# Patient Record
Sex: FEMALE | Race: WHITE | NOT HISPANIC OR LATINO | URBAN - METROPOLITAN AREA
[De-identification: names, ages, dates, MRNs, and addresses within clinical notes are randomized per-mention and may not be internally consistent; named-entity substitution may affect disease eponyms.]

---

## 2017-03-29 ENCOUNTER — EMERGENCY (EMERGENCY)
Facility: HOSPITAL | Age: 82
LOS: 1 days | Discharge: HOME | End: 2017-03-29
Attending: EMERGENCY MEDICINE | Admitting: EMERGENCY MEDICINE
Payer: MEDICARE

## 2017-03-29 VITALS
DIASTOLIC BLOOD PRESSURE: 75 MMHG | HEART RATE: 95 BPM | RESPIRATION RATE: 18 BRPM | OXYGEN SATURATION: 99 % | TEMPERATURE: 98 F | SYSTOLIC BLOOD PRESSURE: 172 MMHG

## 2017-03-29 VITALS — HEIGHT: 58 IN | WEIGHT: 134.92 LBS

## 2017-03-29 PROCEDURE — 99283 EMERGENCY DEPT VISIT LOW MDM: CPT

## 2017-03-29 PROCEDURE — 73552 X-RAY EXAM OF FEMUR 2/>: CPT | Mod: 26,LT

## 2017-03-29 PROCEDURE — 72170 X-RAY EXAM OF PELVIS: CPT

## 2017-03-29 PROCEDURE — 73502 X-RAY EXAM HIP UNI 2-3 VIEWS: CPT | Mod: 26,LT

## 2017-03-29 PROCEDURE — 73552 X-RAY EXAM OF FEMUR 2/>: CPT

## 2017-03-29 PROCEDURE — 73502 X-RAY EXAM HIP UNI 2-3 VIEWS: CPT

## 2017-03-29 PROCEDURE — 99284 EMERGENCY DEPT VISIT MOD MDM: CPT | Mod: 25

## 2017-03-29 NOTE — ED STATDOCS - OBJECTIVE STATEMENT
89 year old female, with hx of left hip surgery 20 years ago, presenting to the ED complaining of left hip pain s/p fall 2-3 weeks ago. Pt's family states that the pt had fallen on her left side and called [facility], but did not visit an ED for an evaluation. She states that she is ambulating with a walker. No further complaints at this time.

## 2017-03-29 NOTE — ED ADULT TRIAGE NOTE - CHIEF COMPLAINT QUOTE
Pt brought in by son for fall, pt fell about two to three weeks ago and the ambulance came (life alert) but pt didn't go to the hospital. Pt's son states pt can't even walk now. Pt denies blood thinners. Pt brought in by son for fall, pt fell about two to three weeks ago and the ambulance came (life alert) but pt didn't go to the hospital. Pt's son states pt can't even walk now. Pt had left hip surgery about 20yrs ago, and that is where she is c/o pain. Pt denies blood thinners.

## 2017-03-29 NOTE — PROVIDER CONTACT NOTE (OTHER) - ASSESSMENT
No c/o pain, before, 8/10 left hip pain during and after amb. Pt is functionally independent and not in need of PT.  Will no longer continue to follow. Pt left sitting OOB in chair in no apparent distress and call bell within reach, RN made aware.

## 2017-03-29 NOTE — ED STATDOCS - NS ED MD SCRIBE ATTENDING SCRIBE SECTIONS
PAST MEDICAL/SURGICAL/SOCIAL HISTORY/REVIEW OF SYSTEMS/HISTORY OF PRESENT ILLNESS/HIV/DISPOSITION/INTAKE ASSESSMENT/SCREENINGS/VITAL SIGNS( Pullset)/PHYSICAL EXAM

## 2017-03-29 NOTE — PHYSICAL THERAPY INITIAL EVALUATION ADULT - ADDITIONAL COMMENTS
Pt lives in a 1st floor apartment, alone, no steps.  Had HHA 5 days a week since fall. Prior to fall pt amb with [facility] and independent with all.  Since fall, amb with SW, short distances. Needed assist with all.

## 2017-03-29 NOTE — PHYSICAL THERAPY INITIAL EVALUATION ADULT - RANGE OF MOTION EXAMINATION, REHAB EVAL
bilateral upper extremity ROM was WFL (within functional limits)/except left hip limited by pain/bilateral lower extremity ROM was WFL (within functional limits)

## 2017-03-29 NOTE — ED STATDOCS - ATTENDING CONTRIBUTION TO CARE
I, Anika Smith, performed the initial face to face bedside interview with this patient regarding history of present illness, review of symptoms and relevant past medical, social and family history.  I completed an independent physical examination.  I was the initial provider who evaluated this patient. I have signed out the follow up of any pending tests (i.e. labs, radiological studies) to the ACP.  I have communicated the patient’s plan of care and disposition with the ACP.  The history, relevant review of systems, past medical and surgical history, medical decision making, and physical examination was documented by the scribe in my presence and I attest to the accuracy of the documentation.

## 2017-03-29 NOTE — ED STATDOCS - PROGRESS NOTE DETAILS
Pts xrays show an isolated linear nondisplaced fracture of the left inferior pubic ramus. Pt is usually ambulatory with a walker at home and lives by herself. Son states that pt has required help over the past week and is no longer able to function on her own like she was able to prior to the fall. D/w Dr. Smith-- will get PT eval and SW consult for likely home care + d/c. Spoke with Kevin from Physical Therapy-- evaluated pt and states that pt is stable for d/c to home with mobile walker. Awaiting SW consult. SW states pt is okay to go home with home care. Pt stable for d/c.

## 2017-05-04 PROBLEM — Z00.00 ENCOUNTER FOR PREVENTIVE HEALTH EXAMINATION: Status: ACTIVE | Noted: 2017-05-04

## 2017-05-08 ENCOUNTER — OUTPATIENT (OUTPATIENT)
Dept: OUTPATIENT SERVICES | Facility: HOSPITAL | Age: 82
LOS: 1 days | End: 2017-05-08
Payer: MEDICARE

## 2017-05-08 ENCOUNTER — APPOINTMENT (OUTPATIENT)
Dept: RADIOLOGY | Facility: CLINIC | Age: 82
End: 2017-05-08
Payer: MEDICARE

## 2017-05-08 DIAGNOSIS — Z00.8 ENCOUNTER FOR OTHER GENERAL EXAMINATION: ICD-10-CM

## 2017-05-08 PROCEDURE — 77080 DXA BONE DENSITY AXIAL: CPT

## 2017-05-08 PROCEDURE — 77080 DXA BONE DENSITY AXIAL: CPT | Mod: 26

## 2017-07-17 ENCOUNTER — INPATIENT (INPATIENT)
Facility: HOSPITAL | Age: 82
LOS: 0 days | Discharge: HOME HEALTH | DRG: 638 | End: 2017-07-18
Attending: FAMILY MEDICINE | Admitting: HOSPITALIST
Payer: COMMERCIAL

## 2017-07-17 VITALS
WEIGHT: 149.91 LBS | RESPIRATION RATE: 18 BRPM | HEIGHT: 63 IN | SYSTOLIC BLOOD PRESSURE: 190 MMHG | TEMPERATURE: 98 F | DIASTOLIC BLOOD PRESSURE: 88 MMHG | HEART RATE: 88 BPM

## 2017-07-17 DIAGNOSIS — I16.1 HYPERTENSIVE EMERGENCY: ICD-10-CM

## 2017-07-17 LAB
ALBUMIN SERPL ELPH-MCNC: 4.8 G/DL — SIGNIFICANT CHANGE UP (ref 3.3–5.2)
ALP SERPL-CCNC: 88 U/L — SIGNIFICANT CHANGE UP (ref 40–120)
ALT FLD-CCNC: 9 U/L — SIGNIFICANT CHANGE UP
ANION GAP SERPL CALC-SCNC: 16 MMOL/L — SIGNIFICANT CHANGE UP (ref 5–17)
APTT BLD: 29.8 SEC — SIGNIFICANT CHANGE UP (ref 27.5–37.4)
AST SERPL-CCNC: 16 U/L — SIGNIFICANT CHANGE UP
BASOPHILS # BLD AUTO: 0 K/UL — SIGNIFICANT CHANGE UP (ref 0–0.2)
BASOPHILS NFR BLD AUTO: 0.2 % — SIGNIFICANT CHANGE UP (ref 0–2)
BILIRUB SERPL-MCNC: 0.3 MG/DL — LOW (ref 0.4–2)
BLD GP AB SCN SERPL QL: SIGNIFICANT CHANGE UP
BUN SERPL-MCNC: 31 MG/DL — HIGH (ref 8–20)
CALCIUM SERPL-MCNC: 9.6 MG/DL — SIGNIFICANT CHANGE UP (ref 8.6–10.2)
CHLORIDE SERPL-SCNC: 91 MMOL/L — LOW (ref 98–107)
CO2 SERPL-SCNC: 23 MMOL/L — SIGNIFICANT CHANGE UP (ref 22–29)
CREAT SERPL-MCNC: 1.26 MG/DL — SIGNIFICANT CHANGE UP (ref 0.5–1.3)
EOSINOPHIL # BLD AUTO: 0.3 K/UL — SIGNIFICANT CHANGE UP (ref 0–0.5)
EOSINOPHIL NFR BLD AUTO: 2.4 % — SIGNIFICANT CHANGE UP (ref 0–6)
GLUCOSE SERPL-MCNC: 51 MG/DL — LOW (ref 70–115)
HCT VFR BLD CALC: 32.5 % — LOW (ref 37–47)
HGB BLD-MCNC: 10.8 G/DL — LOW (ref 12–16)
INR BLD: 1.12 RATIO — SIGNIFICANT CHANGE UP (ref 0.88–1.16)
LYMPHOCYTES # BLD AUTO: 18.5 % — LOW (ref 20–55)
LYMPHOCYTES # BLD AUTO: 2.1 K/UL — SIGNIFICANT CHANGE UP (ref 1–4.8)
MCHC RBC-ENTMCNC: 27.1 PG — SIGNIFICANT CHANGE UP (ref 27–31)
MCHC RBC-ENTMCNC: 33.2 G/DL — SIGNIFICANT CHANGE UP (ref 32–36)
MCV RBC AUTO: 81.5 FL — SIGNIFICANT CHANGE UP (ref 81–99)
MONOCYTES # BLD AUTO: 0.8 K/UL — SIGNIFICANT CHANGE UP (ref 0–0.8)
MONOCYTES NFR BLD AUTO: 7.2 % — SIGNIFICANT CHANGE UP (ref 3–10)
NEUTROPHILS # BLD AUTO: 8.1 K/UL — HIGH (ref 1.8–8)
NEUTROPHILS NFR BLD AUTO: 71 % — SIGNIFICANT CHANGE UP (ref 37–73)
PLATELET # BLD AUTO: 215 K/UL — SIGNIFICANT CHANGE UP (ref 150–400)
POTASSIUM SERPL-MCNC: 3.7 MMOL/L — SIGNIFICANT CHANGE UP (ref 3.5–5.3)
POTASSIUM SERPL-SCNC: 3.7 MMOL/L — SIGNIFICANT CHANGE UP (ref 3.5–5.3)
PROT SERPL-MCNC: 7.6 G/DL — SIGNIFICANT CHANGE UP (ref 6.6–8.7)
PROTHROM AB SERPL-ACNC: 12.3 SEC — SIGNIFICANT CHANGE UP (ref 9.8–12.7)
RBC # BLD: 3.99 M/UL — LOW (ref 4.4–5.2)
RBC # FLD: 14.4 % — SIGNIFICANT CHANGE UP (ref 11–15.6)
SODIUM SERPL-SCNC: 130 MMOL/L — LOW (ref 135–145)
TROPONIN T SERPL-MCNC: <0.01 NG/ML — SIGNIFICANT CHANGE UP (ref 0–0.06)
TYPE + AB SCN PNL BLD: SIGNIFICANT CHANGE UP
WBC # BLD: 11.4 K/UL — HIGH (ref 4.8–10.8)
WBC # FLD AUTO: 11.4 K/UL — HIGH (ref 4.8–10.8)

## 2017-07-17 PROCEDURE — 70450 CT HEAD/BRAIN W/O DYE: CPT | Mod: 26

## 2017-07-17 PROCEDURE — 99291 CRITICAL CARE FIRST HOUR: CPT

## 2017-07-17 PROCEDURE — 93010 ELECTROCARDIOGRAM REPORT: CPT

## 2017-07-17 PROCEDURE — 71010: CPT | Mod: 26

## 2017-07-17 RX ORDER — LABETALOL HCL 100 MG
10 TABLET ORAL ONCE
Qty: 0 | Refills: 0 | Status: COMPLETED | OUTPATIENT
Start: 2017-07-17 | End: 2017-07-17

## 2017-07-17 RX ORDER — DEXTROSE 50 % IN WATER 50 %
50 SYRINGE (ML) INTRAVENOUS ONCE
Qty: 0 | Refills: 0 | Status: COMPLETED | OUTPATIENT
Start: 2017-07-17 | End: 2017-07-17

## 2017-07-17 RX ORDER — ENOXAPARIN SODIUM 100 MG/ML
30 INJECTION SUBCUTANEOUS DAILY
Qty: 0 | Refills: 0 | Status: DISCONTINUED | OUTPATIENT
Start: 2017-07-17 | End: 2017-07-18

## 2017-07-17 RX ADMIN — Medication 10 MILLIGRAM(S): at 17:58

## 2017-07-17 RX ADMIN — Medication 50 MILLILITER(S): at 17:00

## 2017-07-17 RX ADMIN — Medication 50 MILLILITER(S): at 22:15

## 2017-07-17 NOTE — ED ADULT TRIAGE NOTE - CHIEF COMPLAINT QUOTE
patient c/o confusion and slurred speech, was found by compainion altered and hypoglycemic and hypertensive.

## 2017-07-17 NOTE — ED ADULT NURSE REASSESSMENT NOTE - NS ED NURSE REASSESS COMMENT FT1
code stroke called by MD Garcia at 1611, E-ICU called at 1612. code stroke team at the bedside 1614
pateint to CT scan at this [id number]
Per pt family, pt appears more confused and disoriented. FS completed and documented. MD informed, MD orders noted and initiated. after receiving 1 amp dextrose IV- pt a+o, baseline mental status per family. provided pt meal and water. will continue to monitor.
pt awake and alert x2, confused on current year lying in bed with niece at bedside. pt lying comfortably in bed, denies any chest pain or SOB. pt denies any pain or discomfort. pt to be admitted, awaiting orders. abd soft nontender nondistended. bilat lung sounds clear, resp even and unlabored.  NSR on monitor, maintaining 02 on RA, will continue to monitor.

## 2017-07-17 NOTE — ED PROVIDER NOTE - OBJECTIVE STATEMENT
89 y/o female with h/o DM and HTN on oral hypoglycemic medication, was noticed by caretaker to have slurred speech onset 1 hour prior to arrival. pt is confused and with slurred speech on arrival to ed unable to provide adaquate history

## 2017-07-17 NOTE — ED PROVIDER NOTE - MEDICAL DECISION MAKING DETAILS
two episodes hypoglycemia causing ams slurred speech concern on glyburide plus marked elevation bp possibly causing ams symptoms hard to differentiate too many risk factors to send home. code stroke cancelled secondary to improvement in pts symptoms with iv dextrose however still markedly htn responded to iv anti htn need for multiple bedside reassessments neurological status

## 2017-07-17 NOTE — ED PROVIDER NOTE - CARE PLAN
Principal Discharge DX:	Hypertensive emergency  Secondary Diagnosis:	Confusion  Secondary Diagnosis:	Hypoglycemia

## 2017-07-17 NOTE — H&P ADULT - PMH
Coronary artery disease involving native coronary artery of native heart without angina pectoris    DM (diabetes mellitus)    High cholesterol    HTN (hypertension)

## 2017-07-17 NOTE — INPATIENT CERTIFICATION FOR MEDICARE PATIENTS - IN ORDER TO MEET MEDICARE REQUIREMENTS.
In order to meet Medicare requirements, the clinical documentation must support the information cited in the admission order.  Please be sure to provide detailed and clear documentation about the following in the admitting note/history and physical: Yes, Non-Core measure site...

## 2017-07-17 NOTE — ED PROVIDER NOTE - CRITICAL CARE PROVIDED
additional history taking/direct patient care (not related to procedure)/documentation/consultation with other physicians/interpretation of diagnostic studies

## 2017-07-17 NOTE — H&P ADULT - HISTORY OF PRESENT ILLNESS
89 y/o female with h/o DM and HTN on oral hypoglycemic medication, was noticed to slow with slurred speech 89 y/o female with h/o DM and HTN on oral hypoglycemic medication, was noticed to slow with slurred speech. abmbulance was called and blood sugar was low. patient was treated in ambulance and repeat blood sugar 89 y/o female with h/o DM and HTN on oral hypoglycemic medication, was noticed to slow with slurred speech. abmbulance was called and blood sugar was low. patient was treated in ambulance and repeat blood sugar was 45,75, 85, 55 mg/dl. in the ER, last blood sugar was 200 mg/dl. patient is alert and asymptomatic.

## 2017-07-18 ENCOUNTER — TRANSCRIPTION ENCOUNTER (OUTPATIENT)
Age: 82
End: 2017-07-18

## 2017-07-18 VITALS
OXYGEN SATURATION: 99 % | RESPIRATION RATE: 23 BRPM | SYSTOLIC BLOOD PRESSURE: 144 MMHG | DIASTOLIC BLOOD PRESSURE: 62 MMHG | TEMPERATURE: 98 F | HEART RATE: 73 BPM

## 2017-07-18 DIAGNOSIS — E78.5 HYPERLIPIDEMIA, UNSPECIFIED: ICD-10-CM

## 2017-07-18 DIAGNOSIS — I25.10 ATHEROSCLEROTIC HEART DISEASE OF NATIVE CORONARY ARTERY WITHOUT ANGINA PECTORIS: ICD-10-CM

## 2017-07-18 DIAGNOSIS — E16.2 HYPOGLYCEMIA, UNSPECIFIED: ICD-10-CM

## 2017-07-18 DIAGNOSIS — E11.8 TYPE 2 DIABETES MELLITUS WITH UNSPECIFIED COMPLICATIONS: ICD-10-CM

## 2017-07-18 DIAGNOSIS — Z98.42 CATARACT EXTRACTION STATUS, LEFT EYE: Chronic | ICD-10-CM

## 2017-07-18 DIAGNOSIS — Z98.41 CATARACT EXTRACTION STATUS, RIGHT EYE: Chronic | ICD-10-CM

## 2017-07-18 DIAGNOSIS — I10 ESSENTIAL (PRIMARY) HYPERTENSION: ICD-10-CM

## 2017-07-18 LAB
ANION GAP SERPL CALC-SCNC: 19 MMOL/L — HIGH (ref 5–17)
BUN SERPL-MCNC: 22 MG/DL — HIGH (ref 8–20)
CALCIUM SERPL-MCNC: 9.7 MG/DL — SIGNIFICANT CHANGE UP (ref 8.6–10.2)
CHLORIDE SERPL-SCNC: 89 MMOL/L — LOW (ref 98–107)
CHOLEST SERPL-MCNC: 119 MG/DL — SIGNIFICANT CHANGE UP (ref 110–199)
CO2 SERPL-SCNC: 23 MMOL/L — SIGNIFICANT CHANGE UP (ref 22–29)
CREAT SERPL-MCNC: 1.09 MG/DL — SIGNIFICANT CHANGE UP (ref 0.5–1.3)
GLUCOSE SERPL-MCNC: 173 MG/DL — HIGH (ref 70–115)
HBA1C BLD-MCNC: 5.7 % — HIGH (ref 4–5.6)
HCT VFR BLD CALC: 32.8 % — LOW (ref 37–47)
HDLC SERPL-MCNC: 41 MG/DL — LOW
HGB BLD-MCNC: 10.8 G/DL — LOW (ref 12–16)
LIPID PNL WITH DIRECT LDL SERPL: 43 MG/DL — SIGNIFICANT CHANGE UP
MCHC RBC-ENTMCNC: 26.8 PG — LOW (ref 27–31)
MCHC RBC-ENTMCNC: 32.9 G/DL — SIGNIFICANT CHANGE UP (ref 32–36)
MCV RBC AUTO: 81.4 FL — SIGNIFICANT CHANGE UP (ref 81–99)
PLATELET # BLD AUTO: 227 K/UL — SIGNIFICANT CHANGE UP (ref 150–400)
POTASSIUM SERPL-MCNC: 4.2 MMOL/L — SIGNIFICANT CHANGE UP (ref 3.5–5.3)
POTASSIUM SERPL-SCNC: 4.2 MMOL/L — SIGNIFICANT CHANGE UP (ref 3.5–5.3)
RBC # BLD: 4.03 M/UL — LOW (ref 4.4–5.2)
RBC # FLD: 14.2 % — SIGNIFICANT CHANGE UP (ref 11–15.6)
SODIUM SERPL-SCNC: 131 MMOL/L — LOW (ref 135–145)
T3 SERPL-MCNC: 105 NG/DL — SIGNIFICANT CHANGE UP (ref 80–200)
T4 AB SER-ACNC: 7.4 UG/DL — SIGNIFICANT CHANGE UP (ref 4.5–12)
TOTAL CHOLESTEROL/HDL RATIO MEASUREMENT: 3 RATIO — LOW (ref 3.3–7.1)
TRIGL SERPL-MCNC: 174 MG/DL — SIGNIFICANT CHANGE UP (ref 10–200)
TSH SERPL-MCNC: 1.64 UIU/ML — SIGNIFICANT CHANGE UP (ref 0.27–4.2)
WBC # BLD: 9.4 K/UL — SIGNIFICANT CHANGE UP (ref 4.8–10.8)
WBC # FLD AUTO: 9.4 K/UL — SIGNIFICANT CHANGE UP (ref 4.8–10.8)

## 2017-07-18 PROCEDURE — 80061 LIPID PANEL: CPT

## 2017-07-18 PROCEDURE — 99239 HOSP IP/OBS DSCHRG MGMT >30: CPT

## 2017-07-18 PROCEDURE — 84484 ASSAY OF TROPONIN QUANT: CPT

## 2017-07-18 PROCEDURE — 84436 ASSAY OF TOTAL THYROXINE: CPT

## 2017-07-18 PROCEDURE — 85610 PROTHROMBIN TIME: CPT

## 2017-07-18 PROCEDURE — 96374 THER/PROPH/DIAG INJ IV PUSH: CPT

## 2017-07-18 PROCEDURE — 84480 ASSAY TRIIODOTHYRONINE (T3): CPT

## 2017-07-18 PROCEDURE — 83036 HEMOGLOBIN GLYCOSYLATED A1C: CPT

## 2017-07-18 PROCEDURE — 71045 X-RAY EXAM CHEST 1 VIEW: CPT

## 2017-07-18 PROCEDURE — 85730 THROMBOPLASTIN TIME PARTIAL: CPT

## 2017-07-18 PROCEDURE — 96375 TX/PRO/DX INJ NEW DRUG ADDON: CPT

## 2017-07-18 PROCEDURE — 86901 BLOOD TYPING SEROLOGIC RH(D): CPT

## 2017-07-18 PROCEDURE — 85027 COMPLETE CBC AUTOMATED: CPT

## 2017-07-18 PROCEDURE — 80053 COMPREHEN METABOLIC PANEL: CPT

## 2017-07-18 PROCEDURE — 36415 COLL VENOUS BLD VENIPUNCTURE: CPT

## 2017-07-18 PROCEDURE — 86850 RBC ANTIBODY SCREEN: CPT

## 2017-07-18 PROCEDURE — 93005 ELECTROCARDIOGRAM TRACING: CPT

## 2017-07-18 PROCEDURE — 80048 BASIC METABOLIC PNL TOTAL CA: CPT

## 2017-07-18 PROCEDURE — 70450 CT HEAD/BRAIN W/O DYE: CPT

## 2017-07-18 PROCEDURE — 92610 EVALUATE SWALLOWING FUNCTION: CPT

## 2017-07-18 PROCEDURE — 84443 ASSAY THYROID STIM HORMONE: CPT

## 2017-07-18 PROCEDURE — 86900 BLOOD TYPING SEROLOGIC ABO: CPT

## 2017-07-18 PROCEDURE — 99285 EMERGENCY DEPT VISIT HI MDM: CPT | Mod: 25

## 2017-07-18 RX ORDER — HYDROCHLOROTHIAZIDE 25 MG
25 TABLET ORAL DAILY
Qty: 0 | Refills: 0 | Status: DISCONTINUED | OUTPATIENT
Start: 2017-07-18 | End: 2017-07-18

## 2017-07-18 RX ORDER — SIMVASTATIN 20 MG/1
40 TABLET, FILM COATED ORAL AT BEDTIME
Qty: 0 | Refills: 0 | Status: DISCONTINUED | OUTPATIENT
Start: 2017-07-18 | End: 2017-07-18

## 2017-07-18 RX ORDER — CLOPIDOGREL BISULFATE 75 MG/1
75 TABLET, FILM COATED ORAL DAILY
Qty: 0 | Refills: 0 | Status: DISCONTINUED | OUTPATIENT
Start: 2017-07-18 | End: 2017-07-18

## 2017-07-18 RX ORDER — ASPIRIN/CALCIUM CARB/MAGNESIUM 324 MG
325 TABLET ORAL DAILY
Qty: 0 | Refills: 0 | Status: DISCONTINUED | OUTPATIENT
Start: 2017-07-17 | End: 2017-07-18

## 2017-07-18 RX ORDER — LISINOPRIL 2.5 MG/1
40 TABLET ORAL DAILY
Qty: 0 | Refills: 0 | Status: DISCONTINUED | OUTPATIENT
Start: 2017-07-17 | End: 2017-07-18

## 2017-07-18 RX ORDER — GLIMEPIRIDE 1 MG
1 TABLET ORAL
Qty: 0 | Refills: 0 | COMMUNITY

## 2017-07-18 RX ORDER — METOPROLOL TARTRATE 50 MG
25 TABLET ORAL
Qty: 0 | Refills: 0 | Status: DISCONTINUED | OUTPATIENT
Start: 2017-07-18 | End: 2017-07-18

## 2017-07-18 RX ADMIN — Medication 325 MILLIGRAM(S): at 11:16

## 2017-07-18 RX ADMIN — Medication 25 MILLIGRAM(S): at 06:11

## 2017-07-18 RX ADMIN — LISINOPRIL 40 MILLIGRAM(S): 2.5 TABLET ORAL at 06:11

## 2017-07-18 RX ADMIN — ENOXAPARIN SODIUM 30 MILLIGRAM(S): 100 INJECTION SUBCUTANEOUS at 11:16

## 2017-07-18 RX ADMIN — CLOPIDOGREL BISULFATE 75 MILLIGRAM(S): 75 TABLET, FILM COATED ORAL at 11:16

## 2017-07-18 NOTE — DISCHARGE NOTE ADULT - PATIENT PORTAL LINK FT
“You can access the [facility] Patient Portal, offered by [facility], by registering with the following website: [email or web address]”

## 2017-07-18 NOTE — DISCHARGE NOTE ADULT - MEDICATION SUMMARY - MEDICATIONS TO TAKE
I will START or STAY ON the medications listed below when I get home from the hospital:    ROLLING WALKER  -- PLEASE GIVE ONE ROLLING WALKER  -- Indication: For Unstead gait    Aspirin Enteric Coated 325 mg oral delayed release tablet  -- 1 tab(s) by mouth once a day  -- Indication: For Cardio protective lorena    quinapril 40 mg oral tablet  -- 1 tab(s) by mouth once a day  -- Indication: For HTN (hypertension)    Janumet XR 50 mg-500 mg oral tablet, extended release  -- 2 tab(s) by mouth once a day (in the evening)  -- Indication: For DM (diabetes mellitus)    ezetimibe 10 mg oral tablet  -- 1 tab(s) by mouth once a day  -- Indication: For HLD    simvastatin 40 mg oral tablet  -- 1 tab(s) by mouth once a day (at bedtime)  -- Indication: For HLD    clopidogrel 75 mg oral tablet  -- 1 tab(s) by mouth once a day  -- Indication: For Cardio Protective Health    Bystolic 5 mg oral tablet  -- 1 tab(s) by mouth once a day  -- Indication: For HTN (hypertension)    hydroCHLOROthiazide 25 mg oral tablet  -- 1 tab(s) by mouth once a day  -- Indication: For HTN (hypertension)

## 2017-07-18 NOTE — SWALLOW BEDSIDE ASSESSMENT ADULT - ASR SWALLOW ASPIRATION MONITOR
upper respiratory infection/gurgly voice/change of breathing pattern/throat clearing/position upright (89Y)/oral hygiene/pneumonia/fever/cough

## 2017-07-18 NOTE — PATIENT PROFILE ADULT. - NS PRO TALK SOMEONE YN
Cystoscopy  Date/Time: 2/2/2017 1:39 PM  Performed by: PALMIRA CALIX  Authorized by: PALMIRA CALIX     Indications: hematuria    Position:  Dorsal lithotomy  Anesthesia:  Intraurethral instillation  Patient sedated?: No    Preparation: Patient was prepped and draped in usual sterile fashion      Scope type:  Flexible cystoscope  Stent inserted: No    Stent removed: No    External exam normal: Yes    Digital exam performed: No    Urethra normal: Yes  Bladder neck normal: Bladder neck normal   Bladder normal: Yes      Patient tolerance:  Patient tolerated the procedure well with no immediate complications     F/u prn      
no

## 2017-07-18 NOTE — PATIENT PROFILE ADULT. - SOCIAL CONCERNS
None pt may need possible assisted living placement, family requesting information/Needs assistance with advanced directives pt may need possible assisted living placement, lives alone, family requesting information/Needs assistance with advanced directives

## 2017-07-18 NOTE — DISCHARGE NOTE ADULT - HOSPITAL COURSE
89 y/o female with h/o DM and HTN on oral hypoglycemic medication who was brought to ED after episode of slurred speech at home. In the ambulance, pts BG levels was low and was further treated in both ambulance and the ED. CT head in ED negative and CXR in ED negative. Pt was treated in the ED for hypoglycemia with 2 amps of D50. Pts BG responded. In hospital, all other vitals remained stable, no other acute events. Prior to discharge pt and pts family/ discussed the need for more supervision of patient while she is at home. Case management was notified and will set up based on what pts insurance will cover. Pt medically cleared for discharge.         HPI: Patient is a 89y old Female with PMH of HTN, HLD who presented to ED due to episode of slurred speech. In ED found to be hypoglycemic and low BG treated with dextrose. PTs BG has responded and has remained wnl since.     Pt seen and examined at bedside today. Pts  present. ANO x 3. Pt states noticeable improvement and medically feels fine. Pt's  stresses concern for once patient is discharged regarding the need for more pt supervision. ROS negative. Pt and pt's  informed that Glimepiride will be discontinued and pt should no longer take at home.    PHYSICAL EXAM:  GENERAL: NAD, well-groomed, well-developed  NERVOUS SYSTEM:  Alert & Oriented X3, Good concentration; heard of hearing in right ear  CHEST/LUNG: Clear to auscultation b/l, No rales, rhonchi, wheezing, or rubs  HEART: RRR, No murmurs, rubs, or gallops  ABDOMEN: Soft, Nontender, Nondistended; Bowel sounds present  EXTREMITIES:  2+ Peripheral Pulses, No clubbing, cyanosis, or edema    After assessment today, pt is medically cleared to be discharged. Pts outpatient medications were changed, glimperide was discontinued due to repeated episodes of hypoglycemia. All other home meds will remain untouched. Pt was instructed to follow up with her PCP regarding her DM, BP and HLD outpatient regimens. Pt educated to check both BG levels and BP at home. Pts brother was contacted prior to discharge. 89 y/o female with h/o DM and HTN on oral hypoglycemic medication who was brought to ED after episode of slurred speech at home. In the ambulance, pts BG levels was low and was further treated in both ambulance and the ED. CT head in ED negative and CXR in ED negative. Pt was treated in the ED for hypoglycemia with 2 amps of D50. Pts BG responded. In hospital, all other vitals remained stable, no other acute events. Prior to discharge pt and pts family/ discussed the need for more supervision of patient while she is at home. Case management was notified and will set up based on what pts insurance will cover. Pt medically cleared for discharge.         HPI: Patient is a 89y old Female with PMH of HTN, HLD who presented to ED due to episode of slurred speech. In ED found to be hypoglycemic and low BG treated with dextrose. PTs BG has responded and has remained wnl since.     Pt seen and examined at bedside today. Pts  present. ANO x 3. Pt states noticeable improvement and medically feels fine. Pt's  stresses concern for once patient is discharged regarding the need for more pt supervision. ROS negative. Pt and pt's  informed that Glimepiride will be discontinued and pt should no longer take at home.    PHYSICAL EXAM:  GENERAL: NAD, well-groomed, well-developed  NERVOUS SYSTEM:  Alert & Oriented X3, Good concentration; heard of hearing in right ear  CHEST/LUNG: Clear to auscultation b/l, No rales, rhonchi, wheezing, or rubs  HEART: RRR, No murmurs, rubs, or gallops  ABDOMEN: Soft, Nontender, Nondistended; Bowel sounds present  EXTREMITIES:  2+ Peripheral Pulses, No clubbing, cyanosis, or edema    After assessment today, pt is medically cleared to be discharged. Pts outpatient medications were changed, glimperide was discontinued due to repeated episodes of hypoglycemia. All other home meds will remain untouched. Pt was instructed to follow up with her PCP regarding her DM, BP and HLD outpatient regimens. Pt educated to check both BG levels and BP at home. Pts son was contacted prior to discharge. 89 y/o female with h/o DM and HTN on oral hypoglycemic medication who was brought to ED after episode of slurred speech at home. In the ambulance, pts BG levels was low and was further treated in both ambulance and the ED. CT head in ED negative and CXR in ED negative. Pt was treated in the ED for hypoglycemia with 2 amps of D50. Pts BG responded. In hospital, all other vitals remained stable, no other acute events. Prior to discharge pt and pts family/ discussed the need for more supervision of patient while she is at home. Case management was notified and will set up based on what pts insurance will cover. Pt medically cleared for discharge.         HPI: Patient is a 89y old Female with PMH of HTN, HLD who presented to ED due to episode of slurred speech. In ED found to be hypoglycemic and low BG treated with dextrose. PTs BG has responded and has remained wnl since.     Pt seen and examined at bedside today. Pts  present. ANO x 3. Pt states noticeable improvement and medically feels fine. Pt's  stresses concern for once patient is discharged regarding the need for more pt supervision. ROS negative. Pt and pt's  informed that Glimepiride will be discontinued and pt should no longer take at home.    PHYSICAL EXAM:  GENERAL: NAD, well-groomed, well-developed  NERVOUS SYSTEM:  Alert & Oriented X3, Good concentration; heard of hearing in right ear  CHEST/LUNG: Clear to auscultation b/l, No rales, rhonchi, wheezing, or rubs  HEART: RRR, No murmurs, rubs, or gallops  ABDOMEN: Soft, Nontender, Nondistended; Bowel sounds present  EXTREMITIES:  2+ Peripheral Pulses, No clubbing, cyanosis, or edema    After assessment today, pt is medically cleared to be discharged. Pts outpatient medications were changed, glimperide was discontinued due to repeated episodes of hypoglycemia. All other home meds will remain untouched. Pt was instructed to follow up with her PCP regarding her DM, BP and HLD outpatient regimens. Pt educated to check both BG levels and BP at home. Pts son was contacted prior to discharge.     Pt seen and examined with PA. A&P reviewed.   Medically stable for DC  Needs home assistance  Stop glimepiride  Good hydration and regular po intake  Monitor BP daily  Time spent 65 min 89 y/o female with h/o DM and HTN on oral hypoglycemic medication who was brought to ED after episode of slurred speech at home. In the ambulance, pts BG levels was low and was further treated in both ambulance and the ED. CT head in ED negative and CXR in ED negative. Pt was treated in the ED for hypoglycemia with 2 amps of D50. Pts BG responded. In hospital, all other vitals remained stable, no other acute events. Prior to discharge pt and pts family/ discussed the need for more supervision of patient while she is at home. Case management was notified and will set up based on what pts insurance will cover. Pt medically cleared for discharge.         HPI: Patient is a 89y old Female with PMH of HTN, HLD who presented to ED due to episode of slurred speech. In ED found to be hypoglycemic and low BG treated with dextrose. PTs BG has responded and has remained wnl since.     Pt seen and examined at bedside today. Pts  present. ANO x 3. Pt states noticeable improvement and medically feels fine. Pt's  stresses concern for once patient is discharged regarding the need for more pt supervision. ROS negative. Pt and pt's  informed that Glimepiride will be discontinued and pt should no longer take at home.    PHYSICAL EXAM:  GENERAL: NAD, well-groomed, well-developed  NERVOUS SYSTEM:  Alert & Oriented X3, Good concentration; heard of hearing in right ear  CHEST/LUNG: Clear to auscultation b/l, No rales, rhonchi, wheezing, or rubs  HEART: RRR, No murmurs, rubs, or gallops  ABDOMEN: Soft, Nontender, Nondistended; Bowel sounds present  EXTREMITIES:  2+ Peripheral Pulses, No clubbing, cyanosis, or edema    After assessment today, pt is medically cleared to be discharged. Pts outpatient medications were changed, glimperide was discontinued due to repeated episodes of hypoglycemia. All other home meds will remain untouched. Pt was instructed to follow up with her PCP regarding her DM, BP and HLD outpatient regimens. Pt educated to check both BG levels and BP at home. Pts son was contacted prior to discharge.     Pt seen and examined with PA. A&P reviewed.   Drug induced encephalopathy due to oral hypoglycemic medications present on admission - resolved with D5 administration  Medically stable for DC  Needs home assistance  Stop glimepiride  Good hydration and regular po intake  Monitor BP daily  Time spent 65 min

## 2017-07-18 NOTE — DISCHARGE NOTE ADULT - CARE PLAN
Principal Discharge DX:	Hypoglycemia  Goal:	Resolved  Instructions for follow-up, activity and diet:	Pt educated to check blood sugar before and after meals  Pt educated on the importance of adequate hydration and food consumption   Pt Glimperide was discontinued, pt to no longer take this medication  Pt to follow up with PCP regarding DM outpatient regiment to find best appropriate regimen given age and condition  Secondary Diagnosis:	DM (diabetes mellitus)  Goal:	Chronic  Instructions for follow-up, activity and diet:	Pt to stop taking Glimiperide   Pt to continue all other DM meds  Pt to follow up with PCP regarding DM outpatient regiment to find best appropriate regimen given age and condition  Secondary Diagnosis:	Essential hypertension  Goal:	chronic  Instructions for follow-up, activity and diet:	Pt to continue all home BP meds  Pt to follow up with PCP regarding outpatient BP regimen  Pt educated to not take BP meds if not adequately hydrating, eating or if BP is noted to be less than 110  Secondary Diagnosis:	Hyperlipidemia, unspecified hyperlipidemia type  Goal:	Chronic  Instructions for follow-up, activity and diet:	Continue all home meds  Pt instructed to follow up with PCP regarding cholesterol meds (currently on 2 HLD meds)

## 2017-07-18 NOTE — SWALLOW BEDSIDE ASSESSMENT ADULT - SLP GENERAL OBSERVATIONS
A&A Ox2, [place], reduced cognition,  at bedside A&A Ox2, SpO2 99% on room air, [place], reduced cognition,  at bedside

## 2017-07-18 NOTE — DISCHARGE NOTE ADULT - MEDICATION SUMMARY - MEDICATIONS TO STOP TAKING
I will STOP taking the medications listed below when I get home from the hospital:    glimepiride 4 mg oral tablet  -- 1 tab(s) by mouth once a day

## 2017-07-18 NOTE — DISCHARGE NOTE ADULT - PLAN OF CARE
Resolved Pt educated to check blood sugar before and after meals  Pt educated on the importance of adequate hydration and food consumption   Pt Glimperide was discontinued, pt to no longer take this medication  Pt to follow up with PCP regarding DM outpatient regiment to find best appropriate regimen given age and condition Chronic Pt to stop taking Glimiperide   Pt to continue all other DM meds  Pt to follow up with PCP regarding DM outpatient regiment to find best appropriate regimen given age and condition chronic Pt to continue all home BP meds  Pt to follow up with PCP regarding outpatient BP regimen  Pt educated to not take BP meds if not adequately hydrating, eating or if BP is noted to be less than 110 Continue all home meds  Pt instructed to follow up with PCP regarding cholesterol meds (currently on 2 HLD meds)

## 2017-07-20 ENCOUNTER — APPOINTMENT (OUTPATIENT)
Dept: FAMILY MEDICINE | Facility: CLINIC | Age: 82
End: 2017-07-20

## 2017-07-20 VITALS
WEIGHT: 134 LBS | BODY MASS INDEX: 25.3 KG/M2 | DIASTOLIC BLOOD PRESSURE: 60 MMHG | SYSTOLIC BLOOD PRESSURE: 130 MMHG | HEIGHT: 61 IN | OXYGEN SATURATION: 96 % | HEART RATE: 78 BPM

## 2017-07-20 DIAGNOSIS — Z83.3 FAMILY HISTORY OF DIABETES MELLITUS: ICD-10-CM

## 2017-07-20 DIAGNOSIS — Z82.49 FAMILY HISTORY OF ISCHEMIC HEART DISEASE AND OTHER DISEASES OF THE CIRCULATORY SYSTEM: ICD-10-CM

## 2017-07-20 DIAGNOSIS — E87.1 HYPO-OSMOLALITY AND HYPONATREMIA: ICD-10-CM

## 2017-07-20 DIAGNOSIS — Z80.1 FAMILY HISTORY OF MALIGNANT NEOPLASM OF TRACHEA, BRONCHUS AND LUNG: ICD-10-CM

## 2017-07-20 DIAGNOSIS — Z87.891 PERSONAL HISTORY OF NICOTINE DEPENDENCE: ICD-10-CM

## 2017-07-20 RX ORDER — GLIMEPIRIDE 4 MG/1
4 TABLET ORAL
Refills: 0 | Status: DISCONTINUED | COMMUNITY
End: 2017-07-20

## 2017-07-20 RX ORDER — SITAGLIPTIN AND METFORMIN HYDROCHLORIDE 50; 500 MG/1; MG/1
50-500 TABLET, FILM COATED ORAL
Refills: 0 | Status: DISCONTINUED | COMMUNITY
End: 2017-07-20

## 2017-07-21 PROBLEM — I10 ESSENTIAL (PRIMARY) HYPERTENSION: Chronic | Status: ACTIVE | Noted: 2017-07-17

## 2017-07-21 PROBLEM — E11.9 TYPE 2 DIABETES MELLITUS WITHOUT COMPLICATIONS: Chronic | Status: ACTIVE | Noted: 2017-07-17

## 2017-07-21 PROBLEM — E78.00 PURE HYPERCHOLESTEROLEMIA, UNSPECIFIED: Chronic | Status: ACTIVE | Noted: 2017-07-17

## 2017-07-22 ENCOUNTER — TRANSCRIPTION ENCOUNTER (OUTPATIENT)
Age: 82
End: 2017-07-22

## 2017-07-26 ENCOUNTER — APPOINTMENT (OUTPATIENT)
Dept: FAMILY MEDICINE | Facility: CLINIC | Age: 82
End: 2017-07-26

## 2017-07-26 VITALS
WEIGHT: 121 LBS | OXYGEN SATURATION: 96 % | HEART RATE: 86 BPM | SYSTOLIC BLOOD PRESSURE: 120 MMHG | HEIGHT: 61 IN | BODY MASS INDEX: 22.84 KG/M2 | DIASTOLIC BLOOD PRESSURE: 60 MMHG

## 2017-07-26 DIAGNOSIS — E11.9 TYPE 2 DIABETES MELLITUS W/OUT COMPLICATIONS: ICD-10-CM

## 2017-08-15 ENCOUNTER — APPOINTMENT (OUTPATIENT)
Dept: CARDIOLOGY | Facility: CLINIC | Age: 82
End: 2017-08-15
Payer: MEDICARE

## 2017-08-15 VITALS
WEIGHT: 130 LBS | HEART RATE: 79 BPM | HEIGHT: 59 IN | SYSTOLIC BLOOD PRESSURE: 129 MMHG | OXYGEN SATURATION: 97 % | DIASTOLIC BLOOD PRESSURE: 57 MMHG | BODY MASS INDEX: 26.21 KG/M2 | RESPIRATION RATE: 18 BRPM

## 2017-08-15 VITALS — DIASTOLIC BLOOD PRESSURE: 76 MMHG | SYSTOLIC BLOOD PRESSURE: 162 MMHG

## 2017-08-15 DIAGNOSIS — Z86.39 PERSONAL HISTORY OF OTHER ENDOCRINE, NUTRITIONAL AND METABOLIC DISEASE: ICD-10-CM

## 2017-08-15 DIAGNOSIS — I10 ESSENTIAL (PRIMARY) HYPERTENSION: ICD-10-CM

## 2017-08-15 DIAGNOSIS — Z60.2 PROBLEMS RELATED TO LIVING ALONE: ICD-10-CM

## 2017-08-15 DIAGNOSIS — Z86.79 PERSONAL HISTORY OF OTHER DISEASES OF THE CIRCULATORY SYSTEM: ICD-10-CM

## 2017-08-15 DIAGNOSIS — Z79.02 ENCOUNTER FOR THERAPEUTIC DRUG LVL MONITORING: ICD-10-CM

## 2017-08-15 DIAGNOSIS — Z51.81 ENCOUNTER FOR THERAPEUTIC DRUG LVL MONITORING: ICD-10-CM

## 2017-08-15 DIAGNOSIS — Z63.4 DISAPPEARANCE AND DEATH OF FAMILY MEMBER: ICD-10-CM

## 2017-08-15 PROCEDURE — 93000 ELECTROCARDIOGRAM COMPLETE: CPT

## 2017-08-15 PROCEDURE — 99204 OFFICE O/P NEW MOD 45 MIN: CPT

## 2017-08-15 RX ORDER — SIMVASTATIN 40 MG/1
40 TABLET, FILM COATED ORAL
Refills: 0 | Status: ACTIVE | COMMUNITY

## 2017-08-15 RX ORDER — HYDROCHLOROTHIAZIDE 25 MG/1
25 TABLET ORAL
Refills: 0 | Status: DISCONTINUED | COMMUNITY
End: 2017-08-15

## 2017-08-15 RX ORDER — CLOPIDOGREL BISULFATE 75 MG/1
75 TABLET, FILM COATED ORAL
Refills: 0 | Status: DISCONTINUED | COMMUNITY
End: 2017-08-15

## 2017-08-15 SDOH — SOCIAL STABILITY - SOCIAL INSECURITY: DISSAPEARANCE AND DEATH OF FAMILY MEMBER: Z63.4

## 2017-08-15 SDOH — SOCIAL STABILITY - SOCIAL INSECURITY: PROBLEMS RELATED TO LIVING ALONE: Z60.2

## 2017-08-16 ENCOUNTER — MEDICATION RENEWAL (OUTPATIENT)
Age: 82
End: 2017-08-16

## 2017-08-17 ENCOUNTER — INPATIENT (INPATIENT)
Facility: HOSPITAL | Age: 82
LOS: 2 days | Discharge: NURSING, REHAB OR LONG-TERM CARE | DRG: 71 | End: 2017-08-20
Attending: INTERNAL MEDICINE | Admitting: HOSPITALIST
Payer: MEDICARE

## 2017-08-17 VITALS
HEIGHT: 64 IN | RESPIRATION RATE: 16 BRPM | SYSTOLIC BLOOD PRESSURE: 210 MMHG | TEMPERATURE: 98 F | DIASTOLIC BLOOD PRESSURE: 77 MMHG | WEIGHT: 160.06 LBS | OXYGEN SATURATION: 100 % | HEART RATE: 90 BPM

## 2017-08-17 DIAGNOSIS — Z98.42 CATARACT EXTRACTION STATUS, LEFT EYE: Chronic | ICD-10-CM

## 2017-08-17 DIAGNOSIS — R41.0 DISORIENTATION, UNSPECIFIED: ICD-10-CM

## 2017-08-17 DIAGNOSIS — Z98.41 CATARACT EXTRACTION STATUS, RIGHT EYE: Chronic | ICD-10-CM

## 2017-08-17 LAB
ALBUMIN SERPL ELPH-MCNC: 3.8 G/DL — SIGNIFICANT CHANGE UP (ref 3.3–5.2)
ALP SERPL-CCNC: 67 U/L — SIGNIFICANT CHANGE UP (ref 40–120)
ALT FLD-CCNC: 13 U/L — SIGNIFICANT CHANGE UP
ANION GAP SERPL CALC-SCNC: 17 MMOL/L — SIGNIFICANT CHANGE UP (ref 5–17)
APPEARANCE UR: CLEAR — SIGNIFICANT CHANGE UP
APTT BLD: 29 SEC — SIGNIFICANT CHANGE UP (ref 27.5–37.4)
AST SERPL-CCNC: 39 U/L — HIGH
BACTERIA # UR AUTO: ABNORMAL
BASOPHILS # BLD AUTO: 0 K/UL — SIGNIFICANT CHANGE UP (ref 0–0.2)
BASOPHILS NFR BLD AUTO: 0.1 % — SIGNIFICANT CHANGE UP (ref 0–2)
BILIRUB SERPL-MCNC: 0.5 MG/DL — SIGNIFICANT CHANGE UP (ref 0.4–2)
BILIRUB UR-MCNC: NEGATIVE — SIGNIFICANT CHANGE UP
BUN SERPL-MCNC: 25 MG/DL — HIGH (ref 8–20)
CALCIUM SERPL-MCNC: 9.5 MG/DL — SIGNIFICANT CHANGE UP (ref 8.6–10.2)
CHLORIDE SERPL-SCNC: 93 MMOL/L — LOW (ref 98–107)
CK MB CFR SERPL CALC: 4.3 NG/ML — SIGNIFICANT CHANGE UP (ref 0–6.7)
CK SERPL-CCNC: 179 U/L — HIGH (ref 25–170)
CO2 SERPL-SCNC: 22 MMOL/L — SIGNIFICANT CHANGE UP (ref 22–29)
COLOR SPEC: YELLOW — SIGNIFICANT CHANGE UP
COMMENT - URINE: SIGNIFICANT CHANGE UP
CREAT SERPL-MCNC: 1.53 MG/DL — HIGH (ref 0.5–1.3)
DIFF PNL FLD: ABNORMAL
EOSINOPHIL # BLD AUTO: 0 K/UL — SIGNIFICANT CHANGE UP (ref 0–0.5)
EOSINOPHIL NFR BLD AUTO: 0.3 % — SIGNIFICANT CHANGE UP (ref 0–6)
EPI CELLS # UR: SIGNIFICANT CHANGE UP
GLUCOSE SERPL-MCNC: 112 MG/DL — SIGNIFICANT CHANGE UP (ref 70–115)
GLUCOSE UR QL: NEGATIVE MG/DL — SIGNIFICANT CHANGE UP
HCT VFR BLD CALC: 31.3 % — LOW (ref 37–47)
HGB BLD-MCNC: 10.2 G/DL — LOW (ref 12–16)
INR BLD: 1.12 RATIO — SIGNIFICANT CHANGE UP (ref 0.88–1.16)
KETONES UR-MCNC: NEGATIVE — SIGNIFICANT CHANGE UP
LEUKOCYTE ESTERASE UR-ACNC: ABNORMAL
LYMPHOCYTES # BLD AUTO: 1.4 K/UL — SIGNIFICANT CHANGE UP (ref 1–4.8)
LYMPHOCYTES # BLD AUTO: 10.4 % — LOW (ref 20–55)
MCHC RBC-ENTMCNC: 27.2 PG — SIGNIFICANT CHANGE UP (ref 27–31)
MCHC RBC-ENTMCNC: 32.6 G/DL — SIGNIFICANT CHANGE UP (ref 32–36)
MCV RBC AUTO: 83.5 FL — SIGNIFICANT CHANGE UP (ref 81–99)
MONOCYTES # BLD AUTO: 0.9 K/UL — HIGH (ref 0–0.8)
MONOCYTES NFR BLD AUTO: 6.7 % — SIGNIFICANT CHANGE UP (ref 3–10)
NEUTROPHILS # BLD AUTO: 11.3 K/UL — HIGH (ref 1.8–8)
NEUTROPHILS NFR BLD AUTO: 81.9 % — HIGH (ref 37–73)
NITRITE UR-MCNC: NEGATIVE — SIGNIFICANT CHANGE UP
NT-PROBNP SERPL-SCNC: 907 PG/ML — HIGH (ref 0–300)
PH UR: 6 — SIGNIFICANT CHANGE UP (ref 5–8)
PLATELET # BLD AUTO: 256 K/UL — SIGNIFICANT CHANGE UP (ref 150–400)
POTASSIUM SERPL-MCNC: 5.9 MMOL/L — HIGH (ref 3.5–5.3)
POTASSIUM SERPL-SCNC: 5.9 MMOL/L — HIGH (ref 3.5–5.3)
PROT SERPL-MCNC: 7.1 G/DL — SIGNIFICANT CHANGE UP (ref 6.6–8.7)
PROT UR-MCNC: 30 MG/DL
PROTHROM AB SERPL-ACNC: 12.3 SEC — SIGNIFICANT CHANGE UP (ref 9.8–12.7)
RBC # BLD: 3.75 M/UL — LOW (ref 4.4–5.2)
RBC # FLD: 13.9 % — SIGNIFICANT CHANGE UP (ref 11–15.6)
RBC CASTS # UR COMP ASSIST: SIGNIFICANT CHANGE UP /HPF (ref 0–4)
SODIUM SERPL-SCNC: 132 MMOL/L — LOW (ref 135–145)
SP GR SPEC: 1.01 — SIGNIFICANT CHANGE UP (ref 1.01–1.02)
TROPONIN T SERPL-MCNC: <0.01 NG/ML — SIGNIFICANT CHANGE UP (ref 0–0.06)
TSH SERPL-MCNC: 0.82 UIU/ML — SIGNIFICANT CHANGE UP (ref 0.27–4.2)
UROBILINOGEN FLD QL: NEGATIVE MG/DL — SIGNIFICANT CHANGE UP
WBC # BLD: 13.8 K/UL — HIGH (ref 4.8–10.8)
WBC # FLD AUTO: 13.8 K/UL — HIGH (ref 4.8–10.8)
WBC UR QL: SIGNIFICANT CHANGE UP

## 2017-08-17 PROCEDURE — 72170 X-RAY EXAM OF PELVIS: CPT | Mod: 26

## 2017-08-17 PROCEDURE — 70450 CT HEAD/BRAIN W/O DYE: CPT | Mod: 26

## 2017-08-17 PROCEDURE — 99285 EMERGENCY DEPT VISIT HI MDM: CPT

## 2017-08-17 PROCEDURE — 71010: CPT | Mod: 26

## 2017-08-17 RX ORDER — QUINAPRIL HYDROCHLORIDE 40 MG/1
1 TABLET, FILM COATED ORAL
Qty: 0 | Refills: 0 | COMMUNITY

## 2017-08-17 RX ORDER — ASPIRIN/CALCIUM CARB/MAGNESIUM 324 MG
1 TABLET ORAL
Qty: 0 | Refills: 0 | COMMUNITY

## 2017-08-17 RX ORDER — ASPIRIN/CALCIUM CARB/MAGNESIUM 324 MG
325 TABLET ORAL DAILY
Qty: 0 | Refills: 0 | Status: DISCONTINUED | OUTPATIENT
Start: 2017-08-17 | End: 2017-08-20

## 2017-08-17 RX ORDER — HALOPERIDOL DECANOATE 100 MG/ML
1 INJECTION INTRAMUSCULAR ONCE
Qty: 0 | Refills: 0 | Status: COMPLETED | OUTPATIENT
Start: 2017-08-17 | End: 2017-08-17

## 2017-08-17 RX ORDER — SIMVASTATIN 20 MG/1
40 TABLET, FILM COATED ORAL AT BEDTIME
Qty: 0 | Refills: 0 | Status: DISCONTINUED | OUTPATIENT
Start: 2017-08-17 | End: 2017-08-20

## 2017-08-17 RX ORDER — AMLODIPINE BESYLATE 2.5 MG/1
10 TABLET ORAL DAILY
Qty: 0 | Refills: 0 | Status: DISCONTINUED | OUTPATIENT
Start: 2017-08-17 | End: 2017-08-20

## 2017-08-17 RX ORDER — SODIUM CHLORIDE 9 MG/ML
1000 INJECTION INTRAMUSCULAR; INTRAVENOUS; SUBCUTANEOUS
Qty: 0 | Refills: 0 | Status: DISCONTINUED | OUTPATIENT
Start: 2017-08-17 | End: 2017-08-17

## 2017-08-17 RX ORDER — LABETALOL HCL 100 MG
20 TABLET ORAL ONCE
Qty: 0 | Refills: 0 | Status: COMPLETED | OUTPATIENT
Start: 2017-08-17 | End: 2017-08-17

## 2017-08-17 RX ORDER — HALOPERIDOL DECANOATE 100 MG/ML
2.5 INJECTION INTRAMUSCULAR ONCE
Qty: 0 | Refills: 0 | Status: DISCONTINUED | OUTPATIENT
Start: 2017-08-17 | End: 2017-08-17

## 2017-08-17 RX ORDER — CLOPIDOGREL BISULFATE 75 MG/1
75 TABLET, FILM COATED ORAL DAILY
Qty: 0 | Refills: 0 | Status: DISCONTINUED | OUTPATIENT
Start: 2017-08-17 | End: 2017-08-18

## 2017-08-17 RX ORDER — LISINOPRIL 2.5 MG/1
20 TABLET ORAL ONCE
Qty: 0 | Refills: 0 | Status: COMPLETED | OUTPATIENT
Start: 2017-08-17 | End: 2017-08-17

## 2017-08-17 RX ORDER — HALOPERIDOL DECANOATE 100 MG/ML
2.5 INJECTION INTRAMUSCULAR ONCE
Qty: 0 | Refills: 0 | Status: COMPLETED | OUTPATIENT
Start: 2017-08-17 | End: 2017-08-17

## 2017-08-17 RX ORDER — ENOXAPARIN SODIUM 100 MG/ML
30 INJECTION SUBCUTANEOUS DAILY
Qty: 0 | Refills: 0 | Status: DISCONTINUED | OUTPATIENT
Start: 2017-08-17 | End: 2017-08-20

## 2017-08-17 RX ORDER — LATANOPROST 0.05 MG/ML
1 SOLUTION/ DROPS OPHTHALMIC; TOPICAL AT BEDTIME
Qty: 0 | Refills: 0 | Status: DISCONTINUED | OUTPATIENT
Start: 2017-08-17 | End: 2017-08-20

## 2017-08-17 RX ORDER — SODIUM CHLORIDE 9 MG/ML
1000 INJECTION INTRAMUSCULAR; INTRAVENOUS; SUBCUTANEOUS
Qty: 0 | Refills: 0 | Status: DISCONTINUED | OUTPATIENT
Start: 2017-08-17 | End: 2017-08-20

## 2017-08-17 RX ORDER — CLOPIDOGREL BISULFATE 75 MG/1
1 TABLET, FILM COATED ORAL
Qty: 0 | Refills: 0 | COMMUNITY

## 2017-08-17 RX ADMIN — SODIUM CHLORIDE 50 MILLILITER(S): 9 INJECTION INTRAMUSCULAR; INTRAVENOUS; SUBCUTANEOUS at 17:08

## 2017-08-17 RX ADMIN — HALOPERIDOL DECANOATE 2.5 MILLIGRAM(S): 100 INJECTION INTRAMUSCULAR at 21:02

## 2017-08-17 NOTE — ED PROVIDER NOTE - OBJECTIVE STATEMENT
89 yo female recentl d/c'd from Plavix; otherwise treated for htn and high chol; lives alone with daily home health aid and family; was found this morning with home in disarray, confused, and on ground for unknwon period of time. Currently complains of being soiled, cannot recall any specific events; no pain; +deficated on self sometime over night;

## 2017-08-17 NOTE — H&P ADULT - NSHPLABSRESULTS_GEN_ALL_CORE
10.2   13.8  )-----------( 256      ( 17 Aug 2017 13:11 )             31.3     17 Aug 2017 13:11    132    |  93     |  25.0   ----------------------------<  112    5.9     |  22.0   |  1.53     Ca    9.5        17 Aug 2017 13:11    TPro  7.1    /  Alb  3.8    /  TBili  0.5    /  DBili  x      /  AST  39     /  ALT  13     /  AlkPhos  67     17 Aug 2017 13:11    LIVER FUNCTIONS - ( 17 Aug 2017 13:11 )  Alb: 3.8 g/dL / Pro: 7.1 g/dL / ALK PHOS: 67 U/L / ALT: 13 U/L / AST: 39 U/L / GGT: x           PT/INR - ( 17 Aug 2017 13:11 )   PT: 12.3 sec;   INR: 1.12 ratio         PTT - ( 17 Aug 2017 13:11 )  PTT:29.0 sec  CAPILLARY BLOOD GLUCOSE    CARDIAC MARKERS ( 17 Aug 2017 13:11 )  x     / <0.01 ng/mL / 179 U/L / x     / 4.3 ng/mL      Urinalysis Basic - ( 17 Aug 2017 15:58 )    Color: Yellow / Appearance: Clear / S.010 / pH: x  Gluc: x / Ketone: Negative  / Bili: Negative / Urobili: Negative mg/dL   Blood: x / Protein: 30 mg/dL / Nitrite: Negative   Leuk Esterase: Trace / RBC: 0-2 /HPF / WBC 0-2   Sq Epi: x / Non Sq Epi: x / Bacteria: Many    ct head - negative

## 2017-08-17 NOTE — H&P ADULT - NSHPPHYSICALEXAM_GEN_ALL_CORE
PHYSICAL EXAM:    GENERAL: NAD, pleasantly confused  HEAD:  Atraumatic, Normocephalic  EYES: EOMI, PERRLA, conjunctiva and sclera clear  ENMT: No tonsillar erythema, exudates, or enlargement; Moist mucous membranes  NECK: Supple, No JVD, Normal thyroid  NERVOUS SYSTEM:  Alert & Oriented X1-2. follows commands  CHEST/LUNG: Clear to percussion bilaterally; No rales,  HEART: Regular rate and rhythm; No murmurs  ABDOMEN: Soft, Nontender, Nondistended;  EXTREMITIES:  2+ Peripheral Pulses, No edema  SKIN: No rashes or lesions

## 2017-08-17 NOTE — H&P ADULT - ASSESSMENT
1) Robinson --> hold nephrotoxic agents  --> gentle hydration  --> bmp in am    2) Progressive dementia/ inability to take care of herself -> social work consult  --> supportive care    3) Htn --> add norvasc    4) dm2 --> ssi    5) hld --> statin     6) hyperkalemia --> hemolyzed  --> bmp in am     patient will be placed on observation 1) Robinson --> hold nephrotoxic agents  --> gentle hydration  --> bmp in am    2) Progressive dementia/ inability to take care of herself -> social work consult  --> supportive care    3) Htn --> add norvasc  --> not well controlled    4) dm2 --> ssi    5) hld --> statin     6) hyperkalemia --> hemolyzed  --> bmp in am     patient will be placed on observation

## 2017-08-17 NOTE — ED PROVIDER NOTE - CARE PLAN
Principal Discharge DX:	Delirium  Secondary Diagnosis:	Dehydration  Secondary Diagnosis:	NAI (acute kidney injury)

## 2017-08-17 NOTE — ED ADULT NURSE REASSESSMENT NOTE - NS ED NURSE REASSESS COMMENT FT1
Pt extremely agitated, confused and removed iv, possibly sundowing. Md kincaid made aware, 2.5 haldol IV push ordered and administered. PT now resting comfortably in B15L. Report given to HR RN, bhavna figueroa ( at bedside) updated on plan of care.
Pt to be admitted for confusion of unknown origin. Md kincaid and MD montero at bedside updating patient and son on plan of care. Will continue to monitor patient and notify md of any changes,

## 2017-08-17 NOTE — ED PROVIDER NOTE - MEDICAL DECISION MAKING DETAILS
no active head lesion, normal CXR, unremarkbale UA; some slight NAI/dehydration, but still confused, which family states is not her normal; will admit for delirium, hydration

## 2017-08-17 NOTE — ED ADULT TRIAGE NOTE - CHIEF COMPLAINT QUOTE
pt comes to ed from home; patient was found on floor. lives alone. unknown how long patient was laying on floor. unknown loc. no anticoags. patient denies any complaints. resp even and unlabored.

## 2017-08-17 NOTE — H&P ADULT - HISTORY OF PRESENT ILLNESS
89 yof with pmh of htn, hld, dm2 and likely progressing dementia presents from home with confusion, lethargy and soiled clothes. Patient lives at home alone with part time aides and as per son at bedside, has not been taking her medications consistently. Patient was recently at [facility] last month for hypoglycemia and was taken off a sulfonylurea and was sent home. Patient is not the best historian 89 yof with pmh of htn, hld, dm2 and likely progressing dementia presents from home with confusion, lethargy and soiled clothes. Patient lives at home alone with part time aides and as per son at bedside, has not been taking her medications consistently. Patient was recently at [facility] last month for hypoglycemia and was taken off a sulfonylurea and was sent home. Patient is not the best historian and much was taken from son at bedside. Patients son states that they have been trying to transition the patient to a long term placement. Patient also does not drink and take in much oral intake the last several months. Patient denies any fever, chills, nausea, vomiting or diarrhea.     ct head negative

## 2017-08-17 NOTE — ED ADULT NURSE NOTE - OBJECTIVE STATEMENT
Pt axox3 with family at bedside. As per family patient was found on her left side on the floor today at 0900. Pt's son spoke to her last night at 2100, private aid states yesterday 8/1126, pt was slightly confused at home and has a history of hypoglycemia. Family has been monitoring sugars and blood pressures recently and states results have been in WNL.  Pt do not remember falling  or hitting head and only c/o if left knee tenderness, pt recently taken off of plavix. Pt skin i dry but intact , has bruise on left upper arm, otherwise no deformities noted, all sensory and motor skills intact. NIH 0, GCS 15. finger stick from . Pt lives at home by herself and uses walker to ambulate.  PT s1s2 regular, no edema. Pt denies chest pain or sob, respirations are equal and unlabored, abdomen soft/nontender. Pt has right ear hearing aid and charging base Pt axox3 with family at bedside. As per family patient was found on her left side on the floor today at 0900. Pt's son spoke to her last night at 2100, private aid states yesterday 8/1126, pt was slightly confused at home and has a history of hypoglycemia. Family has been monitoring sugars and blood pressures recently and states results have been in WNL.  Pt do not remember falling  or hitting head and only c/o if left knee tenderness, pt recently taken off of plavix. Pt skin i dry but intact , has bruise on left upper arm, otherwise no deformities noted, all sensory and motor skills intact. NIH 0, GCS 15. finger stick from . Pt lives at home by herself and uses walker to ambulate.  PT s1s2 regular, no edema. Pt denies chest pain or sob, respirations are equal and unlabored, abdomen soft/nontender. Pt has right ear hearing aid and charging base.. Pt has stage 2 to left butt cheek

## 2017-08-18 ENCOUNTER — TRANSCRIPTION ENCOUNTER (OUTPATIENT)
Age: 82
End: 2017-08-18

## 2017-08-18 LAB
ANION GAP SERPL CALC-SCNC: 20 MMOL/L — HIGH (ref 5–17)
BUN SERPL-MCNC: 25 MG/DL — HIGH (ref 8–20)
CALCIUM SERPL-MCNC: 9.2 MG/DL — SIGNIFICANT CHANGE UP (ref 8.6–10.2)
CHLORIDE SERPL-SCNC: 97 MMOL/L — LOW (ref 98–107)
CO2 SERPL-SCNC: 20 MMOL/L — LOW (ref 22–29)
CREAT SERPL-MCNC: 1.29 MG/DL — SIGNIFICANT CHANGE UP (ref 0.5–1.3)
GLUCOSE SERPL-MCNC: 143 MG/DL — HIGH (ref 70–115)
HCT VFR BLD CALC: 29.2 % — LOW (ref 37–47)
HGB BLD-MCNC: 9.5 G/DL — LOW (ref 12–16)
MAGNESIUM SERPL-MCNC: 1.5 MG/DL — LOW (ref 1.8–2.6)
MCHC RBC-ENTMCNC: 27.5 PG — SIGNIFICANT CHANGE UP (ref 27–31)
MCHC RBC-ENTMCNC: 32.5 G/DL — SIGNIFICANT CHANGE UP (ref 32–36)
MCV RBC AUTO: 84.6 FL — SIGNIFICANT CHANGE UP (ref 81–99)
PLATELET # BLD AUTO: 288 K/UL — SIGNIFICANT CHANGE UP (ref 150–400)
POTASSIUM SERPL-MCNC: 4.2 MMOL/L — SIGNIFICANT CHANGE UP (ref 3.5–5.3)
POTASSIUM SERPL-SCNC: 4.2 MMOL/L — SIGNIFICANT CHANGE UP (ref 3.5–5.3)
RBC # BLD: 3.45 M/UL — LOW (ref 4.4–5.2)
RBC # FLD: 14 % — SIGNIFICANT CHANGE UP (ref 11–15.6)
SODIUM SERPL-SCNC: 137 MMOL/L — SIGNIFICANT CHANGE UP (ref 135–145)
WBC # BLD: 12.3 K/UL — HIGH (ref 4.8–10.8)
WBC # FLD AUTO: 12.3 K/UL — HIGH (ref 4.8–10.8)

## 2017-08-18 PROCEDURE — 99233 SBSQ HOSP IP/OBS HIGH 50: CPT

## 2017-08-18 RX ORDER — HALOPERIDOL DECANOATE 100 MG/ML
1 INJECTION INTRAMUSCULAR ONCE
Qty: 0 | Refills: 0 | Status: COMPLETED | OUTPATIENT
Start: 2017-08-18 | End: 2017-08-18

## 2017-08-18 RX ORDER — QUINAPRIL HYDROCHLORIDE 40 MG/1
1 TABLET, FILM COATED ORAL
Qty: 0 | Refills: 0 | COMMUNITY

## 2017-08-18 RX ORDER — CEFTRIAXONE 500 MG/1
INJECTION, POWDER, FOR SOLUTION INTRAMUSCULAR; INTRAVENOUS
Qty: 0 | Refills: 0 | Status: DISCONTINUED | OUTPATIENT
Start: 2017-08-18 | End: 2017-08-18

## 2017-08-18 RX ORDER — METFORMIN HYDROCHLORIDE 850 MG/1
1 TABLET ORAL
Qty: 0 | Refills: 0 | COMMUNITY

## 2017-08-18 RX ORDER — HALOPERIDOL DECANOATE 100 MG/ML
2.5 INJECTION INTRAMUSCULAR EVERY 6 HOURS
Qty: 0 | Refills: 0 | Status: DISCONTINUED | OUTPATIENT
Start: 2017-08-18 | End: 2017-08-20

## 2017-08-18 RX ORDER — EZETIMIBE 10 MG/1
1 TABLET ORAL
Qty: 0 | Refills: 0 | COMMUNITY

## 2017-08-18 RX ORDER — DEXTROSE 50 % IN WATER 50 %
25 SYRINGE (ML) INTRAVENOUS ONCE
Qty: 0 | Refills: 0 | Status: DISCONTINUED | OUTPATIENT
Start: 2017-08-18 | End: 2017-08-20

## 2017-08-18 RX ORDER — ONDANSETRON 8 MG/1
4 TABLET, FILM COATED ORAL EVERY 6 HOURS
Qty: 0 | Refills: 0 | Status: DISCONTINUED | OUTPATIENT
Start: 2017-08-18 | End: 2017-08-20

## 2017-08-18 RX ORDER — ASPIRIN/CALCIUM CARB/MAGNESIUM 324 MG
1 TABLET ORAL
Qty: 0 | Refills: 0 | COMMUNITY
Start: 2017-08-18

## 2017-08-18 RX ORDER — ACETAMINOPHEN 500 MG
650 TABLET ORAL EVERY 6 HOURS
Qty: 0 | Refills: 0 | Status: DISCONTINUED | OUTPATIENT
Start: 2017-08-18 | End: 2017-08-20

## 2017-08-18 RX ORDER — CEFTRIAXONE 500 MG/1
INJECTION, POWDER, FOR SOLUTION INTRAMUSCULAR; INTRAVENOUS
Qty: 0 | Refills: 0 | Status: DISCONTINUED | OUTPATIENT
Start: 2017-08-18 | End: 2017-08-20

## 2017-08-18 RX ORDER — INSULIN LISPRO 100/ML
VIAL (ML) SUBCUTANEOUS
Qty: 0 | Refills: 0 | Status: DISCONTINUED | OUTPATIENT
Start: 2017-08-18 | End: 2017-08-20

## 2017-08-18 RX ORDER — DEXTROSE 50 % IN WATER 50 %
12.5 SYRINGE (ML) INTRAVENOUS ONCE
Qty: 0 | Refills: 0 | Status: DISCONTINUED | OUTPATIENT
Start: 2017-08-18 | End: 2017-08-20

## 2017-08-18 RX ORDER — CEFTRIAXONE 500 MG/1
1 INJECTION, POWDER, FOR SOLUTION INTRAMUSCULAR; INTRAVENOUS EVERY 24 HOURS
Qty: 0 | Refills: 0 | Status: DISCONTINUED | OUTPATIENT
Start: 2017-08-19 | End: 2017-08-20

## 2017-08-18 RX ORDER — MAGNESIUM SULFATE 500 MG/ML
2 VIAL (ML) INJECTION ONCE
Qty: 0 | Refills: 0 | Status: COMPLETED | OUTPATIENT
Start: 2017-08-18 | End: 2017-08-18

## 2017-08-18 RX ORDER — SODIUM CHLORIDE 9 MG/ML
1000 INJECTION, SOLUTION INTRAVENOUS
Qty: 0 | Refills: 0 | Status: DISCONTINUED | OUTPATIENT
Start: 2017-08-18 | End: 2017-08-20

## 2017-08-18 RX ORDER — DEXTROSE 50 % IN WATER 50 %
1 SYRINGE (ML) INTRAVENOUS ONCE
Qty: 0 | Refills: 0 | Status: DISCONTINUED | OUTPATIENT
Start: 2017-08-18 | End: 2017-08-20

## 2017-08-18 RX ORDER — GLUCAGON INJECTION, SOLUTION 0.5 MG/.1ML
1 INJECTION, SOLUTION SUBCUTANEOUS ONCE
Qty: 0 | Refills: 0 | Status: DISCONTINUED | OUTPATIENT
Start: 2017-08-18 | End: 2017-08-20

## 2017-08-18 RX ORDER — CEFTRIAXONE 500 MG/1
1 INJECTION, POWDER, FOR SOLUTION INTRAMUSCULAR; INTRAVENOUS ONCE
Qty: 0 | Refills: 0 | Status: COMPLETED | OUTPATIENT
Start: 2017-08-18 | End: 2017-08-18

## 2017-08-18 RX ORDER — NEBIVOLOL HYDROCHLORIDE 5 MG/1
1 TABLET ORAL
Qty: 0 | Refills: 0 | COMMUNITY

## 2017-08-18 RX ORDER — AMLODIPINE BESYLATE 2.5 MG/1
1 TABLET ORAL
Qty: 30 | Refills: 0 | OUTPATIENT
Start: 2017-08-18 | End: 2017-09-17

## 2017-08-18 RX ORDER — LISINOPRIL 2.5 MG/1
1 TABLET ORAL
Qty: 30 | Refills: 0 | OUTPATIENT
Start: 2017-08-18 | End: 2017-09-17

## 2017-08-18 RX ORDER — CLOPIDOGREL BISULFATE 75 MG/1
1 TABLET, FILM COATED ORAL
Qty: 0 | Refills: 0 | COMMUNITY
Start: 2017-08-18

## 2017-08-18 RX ORDER — ONDANSETRON 8 MG/1
4 TABLET, FILM COATED ORAL EVERY 6 HOURS
Qty: 0 | Refills: 0 | Status: DISCONTINUED | OUTPATIENT
Start: 2017-08-18 | End: 2017-08-18

## 2017-08-18 RX ORDER — CEFTRIAXONE 500 MG/1
1 INJECTION, POWDER, FOR SOLUTION INTRAMUSCULAR; INTRAVENOUS ONCE
Qty: 0 | Refills: 0 | Status: DISCONTINUED | OUTPATIENT
Start: 2017-08-18 | End: 2017-08-18

## 2017-08-18 RX ADMIN — Medication 0.5 MILLIGRAM(S): at 16:25

## 2017-08-18 RX ADMIN — AMLODIPINE BESYLATE 10 MILLIGRAM(S): 2.5 TABLET ORAL at 05:25

## 2017-08-18 RX ADMIN — Medication 325 MILLIGRAM(S): at 13:08

## 2017-08-18 RX ADMIN — LATANOPROST 1 DROP(S): 0.05 SOLUTION/ DROPS OPHTHALMIC; TOPICAL at 21:49

## 2017-08-18 RX ADMIN — HALOPERIDOL DECANOATE 1 MILLIGRAM(S): 100 INJECTION INTRAMUSCULAR at 01:29

## 2017-08-18 RX ADMIN — Medication 50 GRAM(S): at 13:08

## 2017-08-18 RX ADMIN — CEFTRIAXONE 100 GRAM(S): 500 INJECTION, POWDER, FOR SOLUTION INTRAMUSCULAR; INTRAVENOUS at 16:26

## 2017-08-18 RX ADMIN — SIMVASTATIN 40 MILLIGRAM(S): 20 TABLET, FILM COATED ORAL at 01:30

## 2017-08-18 RX ADMIN — ENOXAPARIN SODIUM 30 MILLIGRAM(S): 100 INJECTION SUBCUTANEOUS at 13:08

## 2017-08-18 RX ADMIN — HALOPERIDOL DECANOATE 2.5 MILLIGRAM(S): 100 INJECTION INTRAMUSCULAR at 18:20

## 2017-08-18 RX ADMIN — HALOPERIDOL DECANOATE 1 MILLIGRAM(S): 100 INJECTION INTRAMUSCULAR at 05:15

## 2017-08-18 RX ADMIN — LATANOPROST 1 DROP(S): 0.05 SOLUTION/ DROPS OPHTHALMIC; TOPICAL at 01:30

## 2017-08-18 NOTE — PHYSICAL THERAPY INITIAL EVALUATION ADULT - ADDITIONAL COMMENTS
Pt lives alone in a private home. Pt's son lives nearby but works in the [employer], Pt's other son is in [place]. No steps to navigate. Pt ambulated independently PTA with RW but needed Assist for ADLs. Pt son states pt had a private hire aide 4 hours a day for 5 days a week.

## 2017-08-18 NOTE — PHYSICAL THERAPY INITIAL EVALUATION ADULT - PERTINENT HX OF CURRENT PROBLEM, REHAB EVAL
89 yof with pmh of htn, hld, dm2 and likely progressing dementia presents from home with confusion, lethargy and soiled clothes., Admitted for Acute kidney infection

## 2017-08-18 NOTE — PROGRESS NOTE ADULT - SUBJECTIVE AND OBJECTIVE BOX
KYLE ELAINE    [id number]    89y      Female    INTERVAL HPI/OVERNIGHT EVENTS:    patient being seen for metabolic encephalopathy, uti, joy, and med management. Patient seen at bedside with son and in nad.     last 24 hours patient is afebrile.       REVIEW OF SYSTEMS:    CONSTITUTIONAL: No fever, weight loss, or fatigue  RESPIRATORY: No cough, wheezing, hemoptysis; No shortness of breath  CARDIOVASCULAR: No chest pain, palpitations  GASTROINTESTINAL: No abdominal or epigastric pain. No nausea, vomiting  NEUROLOGICAL: No headaches, memory loss, loss of strength.  MISCELLANEOUS:      Vital Signs Last 24 Hrs  T(C): 36.7 (18 Aug 2017 10:10), Max: 37.1 (18 Aug 2017 02:45)  T(F): 98.1 (18 Aug 2017 10:10), Max: 98.8 (18 Aug 2017 02:45)  HR: 105 (18 Aug 2017 10:10) (82 - 117)  BP: 174/74 (18 Aug 2017 10:10) (146/56 - 191/80)  BP(mean): --  RR: 18 (18 Aug 2017 10:10) (17 - 26)  SpO2: 96% (18 Aug 2017 10:10) (96% - 100%)    PHYSICAL EXAM:    GENERAL: NAD, pleasantly confused  HEAD:  Atraumatic, Normocephalic  EYES: EOMI, PERRLA,  NECK: Supple, No JVD, Normal thyroid  NERVOUS SYSTEM:  Alert & Oriented X1-2. follows commands  CHEST/LUNG: Clear to percussion bilaterally; No rales,  HEART: Regular rate and rhythm; No murmurs  EXTREMITIES:  2+ Peripheral Pulses, No edema  SKIN: No rashes or lesions      LABS:                        9.5    12.3  )-----------( 288      ( 18 Aug 2017 06:04 )             29.2     08-18    137  |  97<L>  |  25.0<H>  ----------------------------<  143<H>  4.2   |  20.0<L>  |  1.29    Ca    9.2      18 Aug 2017 06:04  Mg     1.5     08-18    TPro  7.1  /  Alb  3.8  /  TBili  0.5  /  DBili  x   /  AST  39<H>  /  ALT  13  /  AlkPhos  67  08-17    PT/INR - ( 17 Aug 2017 13:11 )   PT: 12.3 sec;   INR: 1.12 ratio         PTT - ( 17 Aug 2017 13:11 )  PTT:29.0 sec  Urinalysis Basic - ( 17 Aug 2017 15:58 )    Color: Yellow / Appearance: Clear / S.010 / pH: x  Gluc: x / Ketone: Negative  / Bili: Negative / Urobili: Negative mg/dL   Blood: x / Protein: 30 mg/dL / Nitrite: Negative   Leuk Esterase: Trace / RBC: 0-2 /HPF / WBC 0-2   Sq Epi: x / Non Sq Epi: x / Bacteria: Many      MEDICATIONS  (STANDING):  simvastatin 40 milliGRAM(s) Oral at bedtime  latanoprost 0.005% Ophthalmic Solution 1 Drop(s) Both EYES at bedtime  sodium chloride 0.9%. 1000 milliLiter(s) (50 mL/Hr) IV Continuous <Continuous>  aspirin 325 milliGRAM(s) Oral daily  enoxaparin Injectable 30 milliGRAM(s) SubCutaneous daily  amLODIPine   Tablet 10 milliGRAM(s) Oral daily  magnesium sulfate  IVPB 2 Gram(s) IV Intermittent once    MEDICATIONS  (PRN):      RADIOLOGY & ADDITIONAL TESTS:    urine culture --> gram neg rods

## 2017-08-18 NOTE — PROGRESS NOTE ADULT - ASSESSMENT
1) Toxic metabolic encephalopathy --> secondary to gram neg uti  --> start ceftriaxone  --> follow up identity    2) Robinson --> hold nephrotoxic agents  --> gentle hydration  --> bmp improved    3) Progressive dementia/ inability to take care of herself -> social work consult  --> supportive care  --> eventual long term     4) Htn --> add norvasc  --> not well controlled  --<> resume ace-I once bmp at baseline     5) dm2 --> ssi    6) hld --> statin     7) hyperkalemia --> resolved  --> bmp in am     8) hypomagensemia --> replete    eventual [place]  will need 3 midnight stay

## 2017-08-18 NOTE — DISCHARGE NOTE ADULT - SECONDARY DIAGNOSIS.
Dehydration NAI (acute kidney injury) Coronary artery disease involving native coronary artery of native heart without angina pectoris DM (diabetes mellitus) HTN (hypertension) High cholesterol Acute cystitis without hematuria

## 2017-08-18 NOTE — PHYSICAL THERAPY INITIAL EVALUATION ADULT - IMPAIRMENTS FOUND, PT EVAL
poor safety awareness/posture/arousal, attention, and cognition/gait, locomotion, and balance/cognitive impairment

## 2017-08-18 NOTE — DISCHARGE NOTE ADULT - ABILITY TO HEAR (WITH HEARING AID OR HEARING APPLIANCE IF NORMALLY USED):
Mildly to Moderately Impaired: difficulty hearing in some environments or speaker may need to increase volume or speak distinctly Unable to assess hearing

## 2017-08-18 NOTE — DISCHARGE NOTE ADULT - MEDICATION SUMMARY - MEDICATIONS TO TAKE
I will START or STAY ON the medications listed below when I get home from the hospital:    aspirin 325 mg oral tablet  -- 1 tab(s) by mouth once a day  -- Indication: For heart health    lisinopril 5 mg oral tablet  -- 1 tab(s) by mouth once a day  -- Do not take this drug if you are pregnant.  It is very important that you take or use this exactly as directed.  Do not skip doses or discontinue unless directed by your doctor.  Some non-prescription drugs may aggravate your condition.  Read all labels carefully.  If a warning appears, check with your doctor before taking.    -- Indication: For htn    simvastatin 40 mg oral tablet  -- 1 tab(s) by mouth once a day (at bedtime)  -- Indication: For hyperlipidemia    clopidogrel 75 mg oral tablet  -- 1 tab(s) by mouth once a day  -- Indication: For cad    amLODIPine 10 mg oral tablet  -- 1 tab(s) by mouth once a day  -- Indication: For htn    Travatan 0.004% ophthalmic solution  -- 1 drop(s) to each affected eye once a day (in the evening)  -- Indication: For glaucoma I will START or STAY ON the medications listed below when I get home from the hospital:    aspirin 325 mg oral tablet  -- 1 tab(s) by mouth once a day  -- Indication: For heart health    lisinopril 5 mg oral tablet  -- 1 tab(s) by mouth once a day  -- Do not take this drug if you are pregnant.  It is very important that you take or use this exactly as directed.  Do not skip doses or discontinue unless directed by your doctor.  Some non-prescription drugs may aggravate your condition.  Read all labels carefully.  If a warning appears, check with your doctor before taking.    -- Indication: For htn    simvastatin 40 mg oral tablet  -- 1 tab(s) by mouth once a day (at bedtime)  -- Indication: For hyperlipidemia    clopidogrel 75 mg oral tablet  -- 1 tab(s) by mouth once a day  -- Indication: For cad    amLODIPine 10 mg oral tablet  -- 1 tab(s) by mouth once a day  -- Indication: For htn    Vantin  -- 100 milligram(s) by mouth 2 times a day Until 8/22/17  -- Indication: For UTI    Travatan 0.004% ophthalmic solution  -- 1 drop(s) to each affected eye once a day (in the evening)  -- Indication: For glaucoma    Florastor 250 mg oral capsule  -- 1 cap(s) by mouth 2 times a day  -- Indication: For Supplement I will START or STAY ON the medications listed below when I get home from the hospital:    aspirin 325 mg oral tablet  -- 1 tab(s) by mouth once a day  -- Indication: For heart health    lisinopril 5 mg oral tablet  -- 1 tab(s) by mouth once a day  -- Do not take this drug if you are pregnant.  It is very important that you take or use this exactly as directed.  Do not skip doses or discontinue unless directed by your doctor.  Some non-prescription drugs may aggravate your condition.  Read all labels carefully.  If a warning appears, check with your doctor before taking.    -- Indication: For htn    simvastatin 40 mg oral tablet  -- 1 tab(s) by mouth once a day (at bedtime)  -- Indication: For hyperlipidemia    clopidogrel 75 mg oral tablet  -- 1 tab(s) by mouth once a day  -- Indication: For cad    amLODIPine 10 mg oral tablet  -- 1 tab(s) by mouth once a day  -- Indication: For htn    Vantin  -- 100 milligram(s) by mouth 2 times a day Until 8/22/17  -- Indication: For UTI    Mag-Ox 400  -- 1 tab(s) by mouth 3 times a day Stop 8/22/17  -- Indication: For Hypomagnesemia    potassium chloride 40 mEq/15 mL oral liquid  -- 15 milliliter(s) by mouth 2 times a day Stop 8/21/17  -- Indication: For Hypokalemia    Travatan 0.004% ophthalmic solution  -- 1 drop(s) to each affected eye once a day (in the evening)  -- Indication: For glaucoma    Florastor 250 mg oral capsule  -- 1 cap(s) by mouth 2 times a day  -- Indication: For Supplement I will START or STAY ON the medications listed below when I get home from the hospital:    aspirin 325 mg oral tablet  -- 1 tab(s) by mouth once a day  -- Indication: For heart health    lisinopril 5 mg oral tablet  -- 1 tab(s) by mouth once a day  -- Do not take this drug if you are pregnant.  It is very important that you take or use this exactly as directed.  Do not skip doses or discontinue unless directed by your doctor.  Some non-prescription drugs may aggravate your condition.  Read all labels carefully.  If a warning appears, check with your doctor before taking.    -- Indication: For htn    HumaLOG 100 units/mL subcutaneous solution  -- 1 dose(s) subcutaneous 4 times a day (after meals and at bedtime)  Check FS qac and qhs.  For FS:  201-250 give 2 units subcut  251-300 give 4 units subcut  301-350 give 8 units subcut  351-400 give 10 units subcut  > 400 give 12 units subcut and call MD    -- Indication: For Diabetes    simvastatin 40 mg oral tablet  -- 1 tab(s) by mouth once a day (at bedtime)  -- Indication: For hyperlipidemia    clopidogrel 75 mg oral tablet  -- 1 tab(s) by mouth once a day  -- Indication: For cad    amLODIPine 10 mg oral tablet  -- 1 tab(s) by mouth once a day  -- Indication: For htn    Vantin  -- 100 milligram(s) by mouth 2 times a day Until 8/22/17  -- Indication: For UTI    Mag-Ox 400  -- 1 tab(s) by mouth 3 times a day Stop 8/22/17  -- Indication: For Hypomagnesemia    potassium chloride 40 mEq/15 mL oral liquid  -- 15 milliliter(s) by mouth 2 times a day Stop 8/21/17  -- Indication: For Hypokalemia    Travatan 0.004% ophthalmic solution  -- 1 drop(s) to each affected eye once a day (in the evening)  -- Indication: For glaucoma    Florastor 250 mg oral capsule  -- 1 cap(s) by mouth 2 times a day  -- Indication: For Supplement I will START or STAY ON the medications listed below when I get home from the hospital:    aspirin 325 mg oral tablet  -- 1 tab(s) by mouth once a day  -- Indication: For heart health    lisinopril 5 mg oral tablet  -- 1 tab(s) by mouth once a day  -- Do not take this drug if you are pregnant.  It is very important that you take or use this exactly as directed.  Do not skip doses or discontinue unless directed by your doctor.  Some non-prescription drugs may aggravate your condition.  Read all labels carefully.  If a warning appears, check with your doctor before taking.    -- Indication: For htn    HumaLOG 100 units/mL subcutaneous solution  -- 1 dose(s) subcutaneous 4 times a day (after meals and at bedtime)  Check FS qac and qhs.  For FS:  201-250 give 2 units subcut  251-300 give 4 units subcut  301-350 give 8 units subcut  351-400 give 10 units subcut  > 400 give 12 units subcut and call MD    -- Indication: For Diabetes    simvastatin 40 mg oral tablet  -- 1 tab(s) by mouth once a day (at bedtime)  -- Indication: For hyperlipidemia    amLODIPine 10 mg oral tablet  -- 1 tab(s) by mouth once a day  -- Indication: For htn    Vantin  -- 100 milligram(s) by mouth 2 times a day Until 8/22/17  -- Indication: For UTI    Mag-Ox 400  -- 1 tab(s) by mouth 3 times a day Stop 8/22/17  -- Indication: For Hypomagnesemia    potassium chloride 40 mEq/15 mL oral liquid  -- 15 milliliter(s) by mouth 2 times a day Stop 8/21/17  -- Indication: For Hypokalemia    Travatan 0.004% ophthalmic solution  -- 1 drop(s) to each affected eye once a day (in the evening)  -- Indication: For glaucoma    Florastor 250 mg oral capsule  -- 1 cap(s) by mouth 2 times a day  -- Indication: For Supplement I will START or STAY ON the medications listed below when I get home from the hospital:    aspirin 81 mg oral tablet  -- 1 tab(s) by mouth once a day  -- Indication: For CAD    lisinopril 5 mg oral tablet  -- 1 tab(s) by mouth once a day  -- Do not take this drug if you are pregnant.  It is very important that you take or use this exactly as directed.  Do not skip doses or discontinue unless directed by your doctor.  Some non-prescription drugs may aggravate your condition.  Read all labels carefully.  If a warning appears, check with your doctor before taking.    -- Indication: For htn    HumaLOG 100 units/mL subcutaneous solution  -- 1 dose(s) subcutaneous 4 times a day (after meals and at bedtime)  Check FS qac and qhs.  For FS:  201-250 give 2 units subcut  251-300 give 4 units subcut  301-350 give 8 units subcut  351-400 give 10 units subcut  > 400 give 12 units subcut and call MD    -- Indication: For Diabetes    simvastatin 40 mg oral tablet  -- 1 tab(s) by mouth once a day (at bedtime)  -- Indication: For hyperlipidemia    amLODIPine 10 mg oral tablet  -- 1 tab(s) by mouth once a day  -- Indication: For htn    Vantin  -- 100 milligram(s) by mouth 2 times a day Until 8/22/17  -- Indication: For UTI    Mag-Ox 400  -- 1 tab(s) by mouth 3 times a day Stop 8/22/17  -- Indication: For Hypomagnesemia    potassium chloride 40 mEq/15 mL oral liquid  -- 15 milliliter(s) by mouth 2 times a day Stop 8/21/17  -- Indication: For Hypokalemia    Travatan 0.004% ophthalmic solution  -- 1 drop(s) to each affected eye once a day (in the evening)  -- Indication: For glaucoma    Florastor 250 mg oral capsule  -- 1 cap(s) by mouth 2 times a day  -- Indication: For Supplement

## 2017-08-18 NOTE — DISCHARGE NOTE ADULT - HOSPITAL COURSE
89 yof with pmh of htn, hld, dm2 and likely progressing dementia presents from home with confusion, lethargy and soiled clothes. Patient lives at home alone with part time aides and as per son at bedside, has not been taking her medications consistently. Patient was recently at [facility] last month for hypoglycemia and was taken off a sulfonylurea and was sent home. Patient is not the best historian and much was taken from son at bedside. Patients son states that they have been trying to transition the patient to a long term placement. Patient also does not drink and take in much oral intake the last several months. Patient denies any fever, chills, nausea, vomiting or diarrhea.     ct head negative    patient admitted as obs and bmp improved and meds adjusted and is now stable for dc with home aides and eventual long term placement.     time spent on dc 32 minutes 89 yof with pmh of htn, hld, dm2 and likely progressing dementia presents from home with confusion, lethargy and soiled clothes. Patient lives at home alone with part time aides and as per son at bedside, has not been taking her medications consistently. Patient was recently at [facility] last month for hypoglycemia and was taken off a sulfonylurea and was sent home. Patient is not the best historian and much was taken from son at bedside. Patients son states that they have been trying to transition the patient to a long term placement. Patient also does not drink and take in much oral intake the last several months. Patient denies any fever, chills, nausea, vomiting or diarrhea.     ct head negative    patient admitted as obs and bmp improved and meds adjusted and is now stable for dc with home aides and eventual long term placement.     time spent on dc 32 minutes    Above authored by Dr. Justin Templeton  Interim History:  WBC normalized.  Pt has remained afebrile.  Seen with son at bedside on 8/19,     ____________________PHYSICAL EXAM:  Vitals reviewed as indicated below  GENERAL:  NAD Alert and Oriented x 1 to person   HEENT: NCAT  CARDIOVASCULAR:  S1, S2  LUNGS: CTAB  ABDOMEN:  soft, (-) tenderness, (-) distension, (+) bowel sounds, (-) guarding, (-) rebound (-) rigidity  EXTREMITIES:  no cyanosis / clubbing / edema.   NEURO: nonfocal.  ____________________    > Toxic metabolic encephalopathy - likely due to dehydration / NAI / Hyponatremia - mental status now improved.  OOB with assist.  > NAI / Dehydration / Hyponatremia / Hypokalemia / Hypomagnesemia - Supplement K, Mg.  > Dementia - Plan for [facility] placement in am.  ? need for LTC.  D/w son advanced directives.  He plans to complete MOLST at [facility] after discussing with family.    > UTI - UCx showing multiple organisms.  Clinically improving, appears nontoxic.  Change to po abx.   > HTN - Monitor BP.  Continue oral antihypertensives.  > Diabetes - monitor fingersticks.  Insulin coverage for hyperglycemia.  > Hyperlipidemia - diet modification.  Continue Statin.      Disposition: stable for discharge.  Outpatient followup as above.  Patient was advised to return if they experience any recurrence or worsening of symptoms.    Total time spent on discharge was 35 minutes.  -Goldy Thomas D.O.  Hospitalist, [facility] 89 yof with pmh of htn, hld, dm2 and likely progressing dementia presents from home with confusion, lethargy and soiled clothes. Patient lives at home alone with part time aides and as per son at bedside, has not been taking her medications consistently. Patient was recently at [facility] last month for hypoglycemia and was taken off a sulfonylurea and was sent home. Patient is not the best historian and much was taken from son at bedside. Patients son states that they have been trying to transition the patient to a long term placement. Patient also does not drink and take in much oral intake the last several months. Patient denies any fever, chills, nausea, vomiting or diarrhea.     ct head negative    patient admitted as obs and bmp improved and meds adjusted and is now stable for dc with home aides and eventual long term placement.     time spent on dc 32 minutes    Above authored by Dr. Justin Templeton  Interim History:  WBC normalized.  Pt has remained afebrile.  Seen with son at bedside on 8/19,     ____________________PHYSICAL EXAM:  Vitals reviewed as indicated below  GENERAL:  NAD Alert and Oriented x 1 to person   HEENT: NCAT  CARDIOVASCULAR:  S1, S2  LUNGS: CTAB  ABDOMEN:  soft, (-) tenderness, (-) distension, (+) bowel sounds, (-) guarding, (-) rebound (-) rigidity  EXTREMITIES:  no cyanosis / clubbing / edema.   NEURO: nonfocal.  ____________________    > Toxic metabolic encephalopathy - likely due to dehydration / NAI / Hyponatremia - mental status now improved.  OOB with assist.  > NAI / Dehydration / Hyponatremia / Hypokalemia / Hypomagnesemia - Supplement K, Mg.  > Dementia - Plan for [facility] placement in am.  ? need for LTC.  D/w son advanced directives.  He plans to complete MOLST at [facility] after discussing with family.    > CAD - continue ASA.  I confirmed with son, pt no longer taking Plavix  > UTI - UCx showing multiple organisms.  Clinically improving, appears nontoxic.  Change to po abx.   > HTN - Monitor BP.  Continue oral antihypertensives.  > Diabetes - monitor fingersticks.  Insulin coverage for hyperglycemia.  > Hyperlipidemia - diet modification.  Continue Statin.      Disposition: stable for discharge.  Outpatient followup as above.  Patient was advised to return if they experience any recurrence or worsening of symptoms.    Total time spent on discharge was 35 minutes.  -Goldy Thomas D.O.  Hospitalist, [facility]

## 2017-08-18 NOTE — DISCHARGE NOTE ADULT - CARE PLAN
Principal Discharge DX:	Delirium  Goal:	resolved  Instructions for follow-up, activity and diet:	likely progressing dementia  son is working on long term placement  Secondary Diagnosis:	Dehydration  Goal:	resolved  Instructions for follow-up, activity and diet:	advised to drink more water  Secondary Diagnosis:	NAI (acute kidney injury)  Goal:	improved  Instructions for follow-up, activity and diet:	seoncdary to meds  quinapril discontinued and added low dose lisinopril  Secondary Diagnosis:	Coronary artery disease involving native coronary artery of native heart without angina pectoris  Goal:	home meds  Secondary Diagnosis:	DM (diabetes mellitus)  Goal:	hemoglobin a1c well controlled  Instructions for follow-up, activity and diet:	dc metformin  Secondary Diagnosis:	HTN (hypertension)  Goal:	home meds  Secondary Diagnosis:	High cholesterol  Goal:	home meds Principal Discharge DX:	Delirium  Goal:	Stable for discharge  Instructions for follow-up, activity and diet:	It is important to see your primary physician as well as the physicians noted below within the next week to perform a comprehensive medical review.  Call their offices for an appointment as soon as you leave the hospital.  If you do not have a primary physician, contact the [facility] at [place] [phone] located on [address] [place] [address], [place] [address].  Your medical issues appear to be stable at this time, but if your symptoms recur or worsen, contact your physicians and/or return to the hospital if necessary.  If you encounter any issues or questions with your medication, call your physicians before stopping the medication.  Do not drive.  Limit your diet to 2 grams of sodium daily.  Secondary Diagnosis:	Dehydration  Goal:	resolved  Instructions for follow-up, activity and diet:	advised to drink more water  Secondary Diagnosis:	NAI (acute kidney injury)  Goal:	improved  Instructions for follow-up, activity and diet:	seoncdary to meds  quinapril discontinued and added low dose lisinopril  Secondary Diagnosis:	Coronary artery disease involving native coronary artery of native heart without angina pectoris  Goal:	home meds  Secondary Diagnosis:	DM (diabetes mellitus)  Goal:	hemoglobin a1c well controlled  Instructions for follow-up, activity and diet:	dc metformin  Secondary Diagnosis:	HTN (hypertension)  Goal:	home meds  Secondary Diagnosis:	Acute cystitis without hematuria  Goal:	home meds Principal Discharge DX:	Delirium  Goal:	Stable for discharge  Instructions for follow-up, activity and diet:	It is important to see your primary physician as well as the physicians noted below within the next week to perform a comprehensive medical review.  Call their offices for an appointment as soon as you leave the hospital.  If you do not have a primary physician, contact the [facility] at [place] [phone] located on [address] [place] [address], [place] [address].  Your medical issues appear to be stable at this time, but if your symptoms recur or worsen, contact your physicians and/or return to the hospital if necessary.  If you encounter any issues or questions with your medication, call your physicians before stopping the medication.  Do not drive.  Limit your diet to 2 grams of sodium daily.  Secondary Diagnosis:	Dehydration  Goal:	resolved  Instructions for follow-up, activity and diet:	advised to drink more water  Secondary Diagnosis:	NAI (acute kidney injury)  Goal:	improved  Instructions for follow-up, activity and diet:	seoncdary to meds  quinapril discontinued and added low dose lisinopril  Secondary Diagnosis:	Coronary artery disease involving native coronary artery of native heart without angina pectoris  Goal:	home meds  Secondary Diagnosis:	DM (diabetes mellitus)  Goal:	hemoglobin a1c well controlled  Instructions for follow-up, activity and diet:	dc metformin  Secondary Diagnosis:	HTN (hypertension)  Goal:	home meds  Secondary Diagnosis:	Acute cystitis without hematuria  Goal:	home meds Principal Discharge DX:	Delirium  Goal:	Stable for discharge  Instructions for follow-up, activity and diet:	It is important to see your primary physician as well as the physicians noted below within the next week to perform a comprehensive medical review.  Call their offices for an appointment as soon as you leave the hospital.  If you do not have a primary physician, contact the [facility] at [place] [phone] located on [address] [place] [address], [place] [address].  Your medical issues appear to be stable at this time, but if your symptoms recur or worsen, contact your physicians and/or return to the hospital if necessary.  If you encounter any issues or questions with your medication, call your physicians before stopping the medication.  Do not drive.  Limit your diet to 2 grams of sodium daily.  Secondary Diagnosis:	Dehydration  Goal:	resolved  Instructions for follow-up, activity and diet:	advised to drink more water  Secondary Diagnosis:	NAI (acute kidney injury)  Goal:	improved  Instructions for follow-up, activity and diet:	seoncdary to meds  quinapril discontinued and added low dose lisinopril  Secondary Diagnosis:	Coronary artery disease involving native coronary artery of native heart without angina pectoris  Goal:	home meds  Secondary Diagnosis:	DM (diabetes mellitus)  Goal:	hemoglobin a1c well controlled  Instructions for follow-up, activity and diet:	dc metformin  Secondary Diagnosis:	HTN (hypertension)  Goal:	home meds  Secondary Diagnosis:	Acute cystitis without hematuria  Goal:	home meds Principal Discharge DX:	Delirium  Goal:	Stable for discharge  Instructions for follow-up, activity and diet:	It is important to see your primary physician as well as the physicians noted below within the next week to perform a comprehensive medical review.  Call their offices for an appointment as soon as you leave the hospital.  If you do not have a primary physician, contact the [facility] at [place] [phone] located on [address] [place] [address], [place] [address].  Your medical issues appear to be stable at this time, but if your symptoms recur or worsen, contact your physicians and/or return to the hospital if necessary.  If you encounter any issues or questions with your medication, call your physicians before stopping the medication.  Do not drive.  Limit your diet to 2 grams of sodium daily.  Secondary Diagnosis:	Dehydration  Goal:	resolved  Instructions for follow-up, activity and diet:	advised to drink more water  Secondary Diagnosis:	NAI (acute kidney injury)  Goal:	improved  Instructions for follow-up, activity and diet:	seoncdary to meds  quinapril discontinued and added low dose lisinopril  Secondary Diagnosis:	Coronary artery disease involving native coronary artery of native heart without angina pectoris  Goal:	home meds  Secondary Diagnosis:	DM (diabetes mellitus)  Goal:	hemoglobin a1c well controlled  Instructions for follow-up, activity and diet:	dc metformin  Secondary Diagnosis:	HTN (hypertension)  Goal:	home meds  Secondary Diagnosis:	Acute cystitis without hematuria  Goal:	home meds Principal Discharge DX:	Delirium  Goal:	Stable for discharge  Instructions for follow-up, activity and diet:	It is important to see your primary physician as well as the physicians noted below within the next week to perform a comprehensive medical review.  Call their offices for an appointment as soon as you leave the hospital.  If you do not have a primary physician, contact the [facility] at [place] [phone] located on [address] [place] [address], [place] [address].  Your medical issues appear to be stable at this time, but if your symptoms recur or worsen, contact your physicians and/or return to the hospital if necessary.  If you encounter any issues or questions with your medication, call your physicians before stopping the medication.  Do not drive.  Limit your diet to 2 grams of sodium daily.  Secondary Diagnosis:	Dehydration  Goal:	resolved  Instructions for follow-up, activity and diet:	advised to drink more water  Secondary Diagnosis:	NAI (acute kidney injury)  Goal:	improved  Instructions for follow-up, activity and diet:	seoncdary to meds  quinapril discontinued and added low dose lisinopril  Secondary Diagnosis:	Coronary artery disease involving native coronary artery of native heart without angina pectoris  Goal:	home meds  Secondary Diagnosis:	DM (diabetes mellitus)  Goal:	hemoglobin a1c well controlled  Instructions for follow-up, activity and diet:	dc metformin  Secondary Diagnosis:	HTN (hypertension)  Goal:	home meds  Secondary Diagnosis:	Acute cystitis without hematuria  Goal:	home meds Principal Discharge DX:	Delirium  Goal:	Stable for discharge  Instructions for follow-up, activity and diet:	It is important to see your primary physician as well as the physicians noted below within the next week to perform a comprehensive medical review.  Call their offices for an appointment as soon as you leave the hospital.  If you do not have a primary physician, contact the [facility] at [place] [phone] located on [address] [place] [address], [place] [address].  Your medical issues appear to be stable at this time, but if your symptoms recur or worsen, contact your physicians and/or return to the hospital if necessary.  If you encounter any issues or questions with your medication, call your physicians before stopping the medication.  Do not drive.  Limit your diet to 2 grams of sodium daily.  Secondary Diagnosis:	Dehydration  Goal:	resolved  Instructions for follow-up, activity and diet:	advised to drink more water  Secondary Diagnosis:	NAI (acute kidney injury)  Goal:	improved  Instructions for follow-up, activity and diet:	seoncdary to meds  quinapril discontinued and added low dose lisinopril  Secondary Diagnosis:	Coronary artery disease involving native coronary artery of native heart without angina pectoris  Goal:	home meds  Secondary Diagnosis:	DM (diabetes mellitus)  Goal:	hemoglobin a1c well controlled  Instructions for follow-up, activity and diet:	dc metformin  Secondary Diagnosis:	HTN (hypertension)  Goal:	home meds  Secondary Diagnosis:	Acute cystitis without hematuria  Goal:	home meds

## 2017-08-18 NOTE — DISCHARGE NOTE ADULT - CARE PROVIDER_API CALL
Agustín Peters (DO), Family Medicine  [address] [place] [address]  Phone: [phone]  Fax: [phone]

## 2017-08-18 NOTE — DISCHARGE NOTE ADULT - PATIENT PORTAL LINK FT
“You can access the FollowHealth Patient Portal, offered by [facility], by registering with the following website: [email or web address]”

## 2017-08-18 NOTE — DISCHARGE NOTE ADULT - PLAN OF CARE
resolved likely progressing dementia  son is working on long term placement advised to drink more water improved seoncdary to meds  quinapril discontinued and added low dose lisinopril home meds hemoglobin a1c well controlled dc metformin Stable for discharge It is important to see your primary physician as well as the physicians noted below within the next week to perform a comprehensive medical review.  Call their offices for an appointment as soon as you leave the hospital.  If you do not have a primary physician, contact the [facility] at [place] [phone] located on [address] [place] [address].  Your medical issues appear to be stable at this time, but if your symptoms recur or worsen, contact your physicians and/or return to the hospital if necessary.  If you encounter any issues or questions with your medication, call your physicians before stopping the medication.  Do not drive.  Limit your diet to 2 grams of sodium daily.

## 2017-08-18 NOTE — DISCHARGE NOTE ADULT - MEDICATION SUMMARY - MEDICATIONS TO STOP TAKING
I will STOP taking the medications listed below when I get home from the hospital:    ROLLING WALKER  -- PLEASE GIVE ONE ROLLING WALKER    Bystolic 5 mg oral tablet  -- 1 tab(s) by mouth once a day    ezetimibe 10 mg oral tablet  -- 1 tab(s) by mouth once a day    quinapril 40 mg oral tablet  -- 1 tab(s) by mouth once a day    quinapril 40 mg oral tablet  -- 1 tab(s) by mouth once a day    metFORMIN 500 mg oral tablet  -- 1 tab(s) by mouth 2 times a day

## 2017-08-18 NOTE — PHYSICAL THERAPY INITIAL EVALUATION ADULT - LEVEL OF INDEPENDENCE: STAND/SIT, REHAB EVAL
I hand wrote for pt, cannot find the orders in our system so had to hand write.   minimum assist (75% patients effort)

## 2017-08-19 LAB
APPEARANCE UR: CLEAR — SIGNIFICANT CHANGE UP
BACTERIA # UR AUTO: ABNORMAL
BILIRUB UR-MCNC: NEGATIVE — SIGNIFICANT CHANGE UP
COLOR SPEC: SIGNIFICANT CHANGE UP
CULTURE RESULTS: SIGNIFICANT CHANGE UP
DIFF PNL FLD: ABNORMAL
EPI CELLS # UR: SIGNIFICANT CHANGE UP
GLUCOSE UR QL: NEGATIVE — SIGNIFICANT CHANGE UP
KETONES UR-MCNC: ABNORMAL
LEUKOCYTE ESTERASE UR-ACNC: ABNORMAL
NITRITE UR-MCNC: NEGATIVE — SIGNIFICANT CHANGE UP
PH UR: 6 — SIGNIFICANT CHANGE UP (ref 5–8)
PROT UR-MCNC: 15
RBC CASTS # UR COMP ASSIST: ABNORMAL /HPF (ref 0–4)
SP GR SPEC: 1.01 — SIGNIFICANT CHANGE UP (ref 1.01–1.02)
SPECIMEN SOURCE: SIGNIFICANT CHANGE UP
UROBILINOGEN FLD QL: NEGATIVE — SIGNIFICANT CHANGE UP
WBC UR QL: SIGNIFICANT CHANGE UP

## 2017-08-19 PROCEDURE — 99233 SBSQ HOSP IP/OBS HIGH 50: CPT

## 2017-08-19 RX ADMIN — SIMVASTATIN 40 MILLIGRAM(S): 20 TABLET, FILM COATED ORAL at 22:18

## 2017-08-19 RX ADMIN — Medication 325 MILLIGRAM(S): at 12:52

## 2017-08-19 RX ADMIN — ENOXAPARIN SODIUM 30 MILLIGRAM(S): 100 INJECTION SUBCUTANEOUS at 12:51

## 2017-08-19 RX ADMIN — AMLODIPINE BESYLATE 10 MILLIGRAM(S): 2.5 TABLET ORAL at 06:02

## 2017-08-19 RX ADMIN — LATANOPROST 1 DROP(S): 0.05 SOLUTION/ DROPS OPHTHALMIC; TOPICAL at 22:11

## 2017-08-19 RX ADMIN — CEFTRIAXONE 100 GRAM(S): 500 INJECTION, POWDER, FOR SOLUTION INTRAMUSCULAR; INTRAVENOUS at 14:22

## 2017-08-19 RX ADMIN — SODIUM CHLORIDE 50 MILLILITER(S): 9 INJECTION INTRAMUSCULAR; INTRAVENOUS; SUBCUTANEOUS at 04:25

## 2017-08-19 NOTE — CHART NOTE - NSCHARTNOTEFT_GEN_A_CORE
Care d/w son at bedside.  Pt with HCP.  Son given MOLST.  WIshes to d/w family before formalizing pt's wishes.  Currently "full code".   Plan for d/c tomorrow discussed.  Son in agreement.

## 2017-08-19 NOTE — PROGRESS NOTE ADULT - SUBJECTIVE AND OBJECTIVE BOX
Patient: KYLE ELAINE [id number] 89y Female                 Internal Medicine Hospitalist Progress Note - Dr. Goldy Thomas    Chief Complaint: Patient is a 89y old  Female who presents with a chief complaint of weakness, found at home confused and with soiled clothes (18 Aug 2017 09:11)    HPI:  89 PMH HTN, HLD, DMIi presented from home with confusion, lethargy and soiled clothes.  She had recently been discharged for hypoglycemia and taken off sulfonylurea.  Treated for UTI.      Seen with her aide at bedside, reports she is near baseline.  Denies urinary complaints.  No fevers/ chills.    ____________________PHYSICAL EXAM:  Vitals reviewed as indicated below  GENERAL:  NAD Alert and Oriented x 1 to person   HEENT: NCAT  CARDIOVASCULAR:  S1, S2  LUNGS: CTAB  ABDOMEN:  soft, (-) tenderness, (-) distension, (+) bowel sounds, (-) guarding, (-) rebound (-) rigidity  EXTREMITIES:  no cyanosis / clubbing / edema.   NEURO: nonfocal.  ____________________    BACKGROUND:  HEALTH ISSUES - PROBLEM Dx:        MEDICATIONS  (STANDING):  simvastatin 40 milliGRAM(s) Oral at bedtime  latanoprost 0.005% Ophthalmic Solution 1 Drop(s) Both EYES at bedtime  sodium chloride 0.9%. 1000 milliLiter(s) (50 mL/Hr) IV Continuous <Continuous>  aspirin 325 milliGRAM(s) Oral daily  enoxaparin Injectable 30 milliGRAM(s) SubCutaneous daily  amLODIPine   Tablet 10 milliGRAM(s) Oral daily  insulin lispro (HumaLOG) corrective regimen sliding scale   SubCutaneous three times a day before meals  dextrose 5%. 1000 milliLiter(s) (50 mL/Hr) IV Continuous <Continuous>  dextrose 50% Injectable 12.5 Gram(s) IV Push once  dextrose 50% Injectable 25 Gram(s) IV Push once  dextrose 50% Injectable 25 Gram(s) IV Push once  cefTRIAXone   IVPB   IV Intermittent   cefTRIAXone   IVPB 1 Gram(s) IV Intermittent every 24 hours    MEDICATIONS  (PRN):  acetaminophen   Tablet 650 milliGRAM(s) Oral every 6 hours PRN For Temp greater than 38 C (100.4 F)  ondansetron Injectable 4 milliGRAM(s) IV Push every 6 hours PRN Nausea and/or Vomiting  dextrose Gel 1 Dose(s) Oral once PRN Blood Glucose LESS THAN 70 milliGRAM(s)/deciliter  glucagon  Injectable 1 milliGRAM(s) IntraMuscular once PRN Glucose LESS THAN 70 milligrams/deciliter  LORazepam   Injectable 0.5 milliGRAM(s) IV Push every 8 hours PRN Agitation  haloperidol    Injectable 2.5 milliGRAM(s) IntraMuscular every 6 hours PRN Agitation    Allergies    No Known Allergies    Intolerances      PAST MEDICAL & SURGICAL HISTORY:  Coronary artery disease involving native coronary artery of native heart without angina pectoris  High cholesterol  HTN (hypertension)  DM (diabetes mellitus)  S/P cataract extraction and insertion of intraocular lens, right  S/P cataract extraction and insertion of intraocular lens, left      VITALS:  Vital Signs Last 24 Hrs  T(C): 36.6 (19 Aug 2017 09:57), Max: 37 (19 Aug 2017 05:00)  T(F): 97.9 (19 Aug 2017 09:57), Max: 98.6 (19 Aug 2017 05:00)  HR: 90 (19 Aug 2017 09:57) (81 - 97)  BP: 156/62 (19 Aug 2017 09:57) (151/59 - 179/68)  BP(mean): --  RR: 18 (19 Aug 2017 09:57) (18 - 18)  SpO2: 97% (19 Aug 2017 09:57) (97% - 97%) Daily     Daily   CAPILLARY BLOOD GLUCOSE  150 (19 Aug 2017 11:36)  119 (19 Aug 2017 08:02)  120 (18 Aug 2017 22:00)  150 (18 Aug 2017 17:09)        I&O's Summary    18 Aug 2017 07:01  -  19 Aug 2017 07:00  --------------------------------------------------------  IN: 1340 mL / OUT: 650 mL / NET: 690 mL        LABS:                        9.5    12.3  )-----------( 288      ( 18 Aug 2017 06:04 )             29.2     08-18    137  |  97<L>  |  25.0<H>  ----------------------------<  143<H>  4.2   |  20.0<L>  |  1.29    Ca    9.2      18 Aug 2017 06:04  Mg     1.5     08-18          Urinalysis Basic - ( 17 Aug 2017 15:58 )    Color: Yellow / Appearance: Clear / S.010 / pH: x  Gluc: x / Ketone: Negative  / Bili: Negative / Urobili: Negative mg/dL   Blood: x / Protein: 30 mg/dL / Nitrite: Negative   Leuk Esterase: Trace / RBC: 0-2 /HPF / WBC 0-2   Sq Epi: x / Non Sq Epi: x / Bacteria: Many

## 2017-08-19 NOTE — PROGRESS NOTE ADULT - ASSESSMENT
> Toxic metabolic encephalopathy - likely due to dehydration / NAI / Hyponatremia - mental status now improved.  OOB with assist.  > NAI / Dehydration / Hyponatremia - lytes improving.  Encourage po.  Monitor BMP.  > Dementia - Long term care.  Plan for [facility] placement in am.  > UTI - UCx showing multiple organisms.  Clinically improving, appears nontoxic.  Would repeat UA. . Likely deescalate Abx in am.  > HTN - Monitor BP.  Continue oral antihypertensives.  > Diabetes - monitor fingersticks.  Insulin coverage for hyperglycemia.  > Hyperlipidemia - diet modification.  Continue Statin.  > DVT Prophylaxis - Lovenox subcut

## 2017-08-20 VITALS
OXYGEN SATURATION: 99 % | RESPIRATION RATE: 18 BRPM | TEMPERATURE: 98 F | HEART RATE: 92 BPM | SYSTOLIC BLOOD PRESSURE: 140 MMHG | DIASTOLIC BLOOD PRESSURE: 62 MMHG

## 2017-08-20 LAB
ANION GAP SERPL CALC-SCNC: 19 MMOL/L — HIGH (ref 5–17)
BUN SERPL-MCNC: 12 MG/DL — SIGNIFICANT CHANGE UP (ref 8–20)
CALCIUM SERPL-MCNC: 9.5 MG/DL — SIGNIFICANT CHANGE UP (ref 8.6–10.2)
CHLORIDE SERPL-SCNC: 98 MMOL/L — SIGNIFICANT CHANGE UP (ref 98–107)
CO2 SERPL-SCNC: 22 MMOL/L — SIGNIFICANT CHANGE UP (ref 22–29)
CREAT SERPL-MCNC: 0.86 MG/DL — SIGNIFICANT CHANGE UP (ref 0.5–1.3)
GLUCOSE SERPL-MCNC: 157 MG/DL — HIGH (ref 70–115)
HCT VFR BLD CALC: 32.9 % — LOW (ref 37–47)
HGB BLD-MCNC: 10.7 G/DL — LOW (ref 12–16)
MAGNESIUM SERPL-MCNC: 1.4 MG/DL — LOW (ref 1.6–2.6)
MCHC RBC-ENTMCNC: 27.3 PG — SIGNIFICANT CHANGE UP (ref 27–31)
MCHC RBC-ENTMCNC: 32.5 G/DL — SIGNIFICANT CHANGE UP (ref 32–36)
MCV RBC AUTO: 83.9 FL — SIGNIFICANT CHANGE UP (ref 81–99)
PLATELET # BLD AUTO: 321 K/UL — SIGNIFICANT CHANGE UP (ref 150–400)
POTASSIUM SERPL-MCNC: 3 MMOL/L — LOW (ref 3.5–5.3)
POTASSIUM SERPL-SCNC: 3 MMOL/L — LOW (ref 3.5–5.3)
RBC # BLD: 3.92 M/UL — LOW (ref 4.4–5.2)
RBC # FLD: 14 % — SIGNIFICANT CHANGE UP (ref 11–15.6)
SODIUM SERPL-SCNC: 139 MMOL/L — SIGNIFICANT CHANGE UP (ref 135–145)
WBC # BLD: 10.8 K/UL — SIGNIFICANT CHANGE UP (ref 4.8–10.8)
WBC # FLD AUTO: 10.8 K/UL — SIGNIFICANT CHANGE UP (ref 4.8–10.8)

## 2017-08-20 PROCEDURE — 99239 HOSP IP/OBS DSCHRG MGMT >30: CPT

## 2017-08-20 RX ORDER — POTASSIUM CHLORIDE 20 MEQ
40 PACKET (EA) ORAL EVERY 4 HOURS
Qty: 0 | Refills: 0 | Status: COMPLETED | OUTPATIENT
Start: 2017-08-20 | End: 2017-08-20

## 2017-08-20 RX ORDER — MAGNESIUM OXIDE 400 MG ORAL TABLET 241.3 MG
400 TABLET ORAL
Qty: 0 | Refills: 0 | Status: DISCONTINUED | OUTPATIENT
Start: 2017-08-20 | End: 2017-08-20

## 2017-08-20 RX ORDER — MAGNESIUM SULFATE 500 MG/ML
2 VIAL (ML) INJECTION ONCE
Qty: 0 | Refills: 0 | Status: DISCONTINUED | OUTPATIENT
Start: 2017-08-20 | End: 2017-08-20

## 2017-08-20 RX ORDER — INSULIN LISPRO 100/ML
1 VIAL (ML) SUBCUTANEOUS
Qty: 0 | Refills: 0 | COMMUNITY
Start: 2017-08-20

## 2017-08-20 RX ADMIN — Medication 325 MILLIGRAM(S): at 12:45

## 2017-08-20 RX ADMIN — ENOXAPARIN SODIUM 30 MILLIGRAM(S): 100 INJECTION SUBCUTANEOUS at 12:45

## 2017-08-20 RX ADMIN — MAGNESIUM OXIDE 400 MG ORAL TABLET 400 MILLIGRAM(S): 241.3 TABLET ORAL at 12:44

## 2017-08-20 RX ADMIN — AMLODIPINE BESYLATE 10 MILLIGRAM(S): 2.5 TABLET ORAL at 08:19

## 2017-08-20 RX ADMIN — Medication 40 MILLIEQUIVALENT(S): at 12:45

## 2017-08-20 RX ADMIN — Medication 2: at 08:19

## 2017-08-20 NOTE — PROGRESS NOTE ADULT - SUBJECTIVE AND OBJECTIVE BOX
Patient: KYLE ELAINE [id number] 89y Female                 Internal Medicine Hospitalist Progress Note - Dr. Goldy Thomas    Chief Complaint: Patient is a 89y old  Female who presents with a chief complaint of weakness, found at home confused and with soiled clothes (18 Aug 2017 09:11)    HPI:  89 PMH HTN, HLD, DMIi presented from home with confusion, lethargy and soiled clothes.  She had recently been discharged for hypoglycemia and taken off sulfonylurea.  Treated for UTI.      WBC normalized.  Pt has remained afebrile.  Seen with son at bedside.     ____________________PHYSICAL EXAM:  Vitals reviewed as indicated below  GENERAL:  NAD Alert and Oriented x 1 to person   HEENT: NCAT  CARDIOVASCULAR:  S1, S2  LUNGS: CTAB  ABDOMEN:  soft, (-) tenderness, (-) distension, (+) bowel sounds, (-) guarding, (-) rebound (-) rigidity  EXTREMITIES:  no cyanosis / clubbing / edema.   NEURO: nonfocal.  ____________________        MEDICATIONS:  simvastatin 40 milliGRAM(s) Oral at bedtime  latanoprost 0.005% Ophthalmic Solution 1 Drop(s) Both EYES at bedtime  sodium chloride 0.9%. 1000 milliLiter(s) IV Continuous <Continuous>  aspirin 325 milliGRAM(s) Oral daily  enoxaparin Injectable 30 milliGRAM(s) SubCutaneous daily  amLODIPine   Tablet 10 milliGRAM(s) Oral daily  acetaminophen   Tablet 650 milliGRAM(s) Oral every 6 hours PRN  ondansetron Injectable 4 milliGRAM(s) IV Push every 6 hours PRN  insulin lispro (HumaLOG) corrective regimen sliding scale   SubCutaneous three times a day before meals  dextrose 5%. 1000 milliLiter(s) IV Continuous <Continuous>  dextrose Gel 1 Dose(s) Oral once PRN  dextrose 50% Injectable 12.5 Gram(s) IV Push once  dextrose 50% Injectable 25 Gram(s) IV Push once  dextrose 50% Injectable 25 Gram(s) IV Push once  glucagon  Injectable 1 milliGRAM(s) IntraMuscular once PRN  cefTRIAXone   IVPB   IV Intermittent   cefTRIAXone   IVPB 1 Gram(s) IV Intermittent every 24 hours  LORazepam   Injectable 0.5 milliGRAM(s) IV Push every 8 hours PRN  haloperidol    Injectable 2.5 milliGRAM(s) IntraMuscular every 6 hours PRN  magnesium oxide 400 milliGRAM(s) Oral three times a day with meals  potassium chloride    Tablet ER 40 milliEquivalent(s) Oral every 4 hours      VITALS:  Vital Signs Last 24 Hrs  T(C): 37.1 (19 Aug 2017 22:05), Max: 37.1 (19 Aug 2017 22:05)  T(F): 98.7 (19 Aug 2017 22:05), Max: 98.7 (19 Aug 2017 22:05)  HR: 93 (19 Aug 2017 22:05) (90 - 93)  BP: 166/72 (20 Aug 2017 05:35) (156/62 - 172/70)  BP(mean): --  RR: 16 (19 Aug 2017 22:05) (16 - 18)  SpO2: 95% (19 Aug 2017 22:05) (95% - 97%) Daily     Daily   CAPILLARY BLOOD GLUCOSE  195 (20 Aug 2017 08:18)  140 (20 Aug 2017 00:36)  147 (19 Aug 2017 17:25)  150 (19 Aug 2017 11:36)        I&O's Summary    19 Aug 2017 07:01  -  20 Aug 2017 07:00  --------------------------------------------------------  IN: 1000 mL / OUT: 352 mL / NET: 648 mL        LABS:                        10.7   10.8  )-----------( 321      ( 20 Aug 2017 06:59 )             32.9     08-20    139  |  98  |  12.0  ----------------------------<  157<H>  3.0<L>   |  22.0  |  0.86    Ca    9.5      20 Aug 2017 06:59  Mg     1.4     08-20          Urinalysis Basic - ( 19 Aug 2017 22:34 )    Color: x / Appearance: Clear / S.010 / pH: x  Gluc: x / Ketone: Trace  / Bili: Negative / Urobili: Negative   Blood: x / Protein: 15 / Nitrite: Negative   Leuk Esterase: Trace / RBC: 3-5 /HPF / WBC 3-5   Sq Epi: x / Non Sq Epi: Few / Bacteria: Few

## 2017-08-20 NOTE — PROGRESS NOTE ADULT - ASSESSMENT
> Toxic metabolic encephalopathy - likely due to dehydration / NAI / Hyponatremia - mental status now improved.  OOB with assist.  > NAI / Dehydration / Hyponatremia / Hypokalemia / Hypomagnesemia - Supplement K, Mg.  > Dementia - Plan for [facility] placement in am.  ? need for LTC.  D/w son advanced directives.  He plans to complete [facility] at [facility] after discussing with family.    > UTI - UCx showing multiple organisms.  Clinically improving, appears nontoxic.  Change to po abx.   > HTN - Monitor BP.  Continue oral antihypertensives.  > Diabetes - monitor fingersticks.  Insulin coverage for hyperglycemia.  > Hyperlipidemia - diet modification.  Continue Statin.  > DVT Prophylaxis - Lovenox subcut

## 2017-09-09 ENCOUNTER — NON-APPOINTMENT (OUTPATIENT)
Age: 82
End: 2017-09-09

## 2017-09-09 PROBLEM — Z51.81 VISIT FOR MONITORING PLAVIX THERAPY: Status: ACTIVE | Noted: 2017-09-09

## 2017-10-13 ENCOUNTER — EMERGENCY (EMERGENCY)
Facility: HOSPITAL | Age: 82
LOS: 1 days | Discharge: HOME | End: 2017-10-13
Attending: EMERGENCY MEDICINE
Payer: MEDICARE

## 2017-10-13 VITALS
SYSTOLIC BLOOD PRESSURE: 116 MMHG | DIASTOLIC BLOOD PRESSURE: 58 MMHG | HEIGHT: 59 IN | WEIGHT: 119.05 LBS | OXYGEN SATURATION: 99 % | HEART RATE: 87 BPM | RESPIRATION RATE: 18 BRPM | TEMPERATURE: 98 F

## 2017-10-13 VITALS
DIASTOLIC BLOOD PRESSURE: 58 MMHG | OXYGEN SATURATION: 98 % | HEART RATE: 84 BPM | RESPIRATION RATE: 17 BRPM | SYSTOLIC BLOOD PRESSURE: 120 MMHG | TEMPERATURE: 98 F

## 2017-10-13 DIAGNOSIS — Z98.42 CATARACT EXTRACTION STATUS, LEFT EYE: Chronic | ICD-10-CM

## 2017-10-13 DIAGNOSIS — Z98.41 CATARACT EXTRACTION STATUS, RIGHT EYE: Chronic | ICD-10-CM

## 2017-10-13 LAB
ALBUMIN SERPL ELPH-MCNC: 4 G/DL — SIGNIFICANT CHANGE UP (ref 3.3–5.2)
ALP SERPL-CCNC: 84 U/L — SIGNIFICANT CHANGE UP (ref 40–120)
ALT FLD-CCNC: 10 U/L — SIGNIFICANT CHANGE UP
ANION GAP SERPL CALC-SCNC: 16 MMOL/L — SIGNIFICANT CHANGE UP (ref 5–17)
APPEARANCE UR: CLEAR — SIGNIFICANT CHANGE UP
AST SERPL-CCNC: 14 U/L — SIGNIFICANT CHANGE UP
BACTERIA # UR AUTO: ABNORMAL
BASOPHILS # BLD AUTO: 0 K/UL — SIGNIFICANT CHANGE UP (ref 0–0.2)
BASOPHILS NFR BLD AUTO: 0.1 % — SIGNIFICANT CHANGE UP (ref 0–2)
BILIRUB SERPL-MCNC: 0.3 MG/DL — LOW (ref 0.4–2)
BILIRUB UR-MCNC: NEGATIVE — SIGNIFICANT CHANGE UP
BUN SERPL-MCNC: 29 MG/DL — HIGH (ref 8–20)
CALCIUM SERPL-MCNC: 9 MG/DL — SIGNIFICANT CHANGE UP (ref 8.6–10.2)
CHLORIDE SERPL-SCNC: 97 MMOL/L — LOW (ref 98–107)
CO2 SERPL-SCNC: 21 MMOL/L — LOW (ref 22–29)
COLOR SPEC: YELLOW — SIGNIFICANT CHANGE UP
CREAT SERPL-MCNC: 1.65 MG/DL — HIGH (ref 0.5–1.3)
DIFF PNL FLD: ABNORMAL
EOSINOPHIL # BLD AUTO: 0 K/UL — SIGNIFICANT CHANGE UP (ref 0–0.5)
EOSINOPHIL NFR BLD AUTO: 0.4 % — SIGNIFICANT CHANGE UP (ref 0–6)
EPI CELLS # UR: SIGNIFICANT CHANGE UP
GLUCOSE SERPL-MCNC: 107 MG/DL — SIGNIFICANT CHANGE UP (ref 70–115)
GLUCOSE UR QL: NEGATIVE MG/DL — SIGNIFICANT CHANGE UP
HCT VFR BLD CALC: 25 % — LOW (ref 37–47)
HGB BLD-MCNC: 8 G/DL — LOW (ref 12–16)
KETONES UR-MCNC: NEGATIVE — SIGNIFICANT CHANGE UP
LEUKOCYTE ESTERASE UR-ACNC: ABNORMAL
LYMPHOCYTES # BLD AUTO: 1.6 K/UL — SIGNIFICANT CHANGE UP (ref 1–4.8)
LYMPHOCYTES # BLD AUTO: 11.4 % — LOW (ref 20–55)
MCHC RBC-ENTMCNC: 26.8 PG — LOW (ref 27–31)
MCHC RBC-ENTMCNC: 32 G/DL — SIGNIFICANT CHANGE UP (ref 32–36)
MCV RBC AUTO: 83.9 FL — SIGNIFICANT CHANGE UP (ref 81–99)
MONOCYTES # BLD AUTO: 0.7 K/UL — SIGNIFICANT CHANGE UP (ref 0–0.8)
MONOCYTES NFR BLD AUTO: 5.4 % — SIGNIFICANT CHANGE UP (ref 3–10)
NEUTROPHILS # BLD AUTO: 11.2 K/UL — HIGH (ref 1.8–8)
NEUTROPHILS NFR BLD AUTO: 82 % — HIGH (ref 37–73)
NITRITE UR-MCNC: NEGATIVE — SIGNIFICANT CHANGE UP
PH UR: 6 — SIGNIFICANT CHANGE UP (ref 5–8)
PLATELET # BLD AUTO: 305 K/UL — SIGNIFICANT CHANGE UP (ref 150–400)
POTASSIUM SERPL-MCNC: 4.7 MMOL/L — SIGNIFICANT CHANGE UP (ref 3.5–5.3)
POTASSIUM SERPL-SCNC: 4.7 MMOL/L — SIGNIFICANT CHANGE UP (ref 3.5–5.3)
PROT SERPL-MCNC: 6.8 G/DL — SIGNIFICANT CHANGE UP (ref 6.6–8.7)
PROT UR-MCNC: 30 MG/DL
RBC # BLD: 2.98 M/UL — LOW (ref 4.4–5.2)
RBC # FLD: 14.8 % — SIGNIFICANT CHANGE UP (ref 11–15.6)
RBC CASTS # UR COMP ASSIST: SIGNIFICANT CHANGE UP /HPF (ref 0–4)
SODIUM SERPL-SCNC: 134 MMOL/L — LOW (ref 135–145)
SP GR SPEC: 1.01 — SIGNIFICANT CHANGE UP (ref 1.01–1.02)
UROBILINOGEN FLD QL: NEGATIVE MG/DL — SIGNIFICANT CHANGE UP
WBC # BLD: 13.6 K/UL — HIGH (ref 4.8–10.8)
WBC # FLD AUTO: 13.6 K/UL — HIGH (ref 4.8–10.8)
WBC UR QL: ABNORMAL

## 2017-10-13 PROCEDURE — 80053 COMPREHEN METABOLIC PANEL: CPT

## 2017-10-13 PROCEDURE — 87086 URINE CULTURE/COLONY COUNT: CPT

## 2017-10-13 PROCEDURE — 71045 X-RAY EXAM CHEST 1 VIEW: CPT

## 2017-10-13 PROCEDURE — 85027 COMPLETE CBC AUTOMATED: CPT

## 2017-10-13 PROCEDURE — 81001 URINALYSIS AUTO W/SCOPE: CPT

## 2017-10-13 PROCEDURE — 71010: CPT | Mod: 26

## 2017-10-13 PROCEDURE — 99284 EMERGENCY DEPT VISIT MOD MDM: CPT

## 2017-10-13 PROCEDURE — 99284 EMERGENCY DEPT VISIT MOD MDM: CPT | Mod: 25

## 2017-10-13 PROCEDURE — 36415 COLL VENOUS BLD VENIPUNCTURE: CPT

## 2017-10-13 RX ORDER — SODIUM CHLORIDE 9 MG/ML
500 INJECTION INTRAMUSCULAR; INTRAVENOUS; SUBCUTANEOUS ONCE
Qty: 0 | Refills: 0 | Status: COMPLETED | OUTPATIENT
Start: 2017-10-13 | End: 2017-10-13

## 2017-10-13 RX ORDER — CEPHALEXIN 500 MG
500 CAPSULE ORAL ONCE
Qty: 0 | Refills: 0 | Status: COMPLETED | OUTPATIENT
Start: 2017-10-13 | End: 2017-10-13

## 2017-10-13 RX ORDER — CEPHALEXIN 500 MG
1 CAPSULE ORAL
Qty: 20 | Refills: 0 | OUTPATIENT
Start: 2017-10-13 | End: 2017-10-23

## 2017-10-13 RX ADMIN — SODIUM CHLORIDE 500 MILLILITER(S): 9 INJECTION INTRAMUSCULAR; INTRAVENOUS; SUBCUTANEOUS at 16:39

## 2017-10-13 RX ADMIN — Medication 500 MILLIGRAM(S): at 18:00

## 2017-10-13 NOTE — ED ADULT NURSE NOTE - CHIEF COMPLAINT QUOTE
Pt BIBA from the [facility], sent to ED for WBC of 14 and r/o UTI. Pt with hx of dementia, per ems pt at her baseline mental status. Pt denies any complaints at this time. Pt in no apparent distress, respirations even and unlabored. Pt placed in yellow gown for elopement precautions.

## 2017-10-13 NOTE — ED ADULT TRIAGE NOTE - CHIEF COMPLAINT QUOTE
Pt BIBA from the [facility], sent to ED for WBC of 14 and r/o UTI. Pt with hx of dementia, per ems pt at her baseline mental status. Pt denies any complaints at this time. Pt in no apparent distress, respirations even and unlabored. Pt placed in yellow gown for elopement precautions.

## 2017-10-13 NOTE — ED PROVIDER NOTE - OBJECTIVE STATEMENT
89yoF with h/o DM, HTN, HLD , dementia, CAD, sent from formerly [facility] after blood work drawn Tuesday showed elevated WBC count.  Pt was recently admitted here with AMS/ UTI and discharged to [facility];  arrived at [facility] this monday. Per son who has seen her 3 times since arrival at [facility], patient at mental status baseline; no evident cough/ vomiting/diarrhea/ change in mental status - patient states she is feeling well - negative ROS.  Former smoker 40+ years ago;  NKDA

## 2017-10-13 NOTE — ED PROVIDER NOTE - PROGRESS NOTE DETAILS
I discussed results with son, need to f/u with PCP and anemia which needs rechecking. Son states he prefers to bring her home and will have her followup with her Dr. He has been giving patient plenty of fluids drink in the ER. Per [facility] scripts sent to [facility] pharmacy

## 2017-10-13 NOTE — ED ADULT NURSE NOTE - OBJECTIVE STATEMENT
patient was brought to the ED because of abnormal labs for possible infection at the [facility]. patient is denies any symptoms at this time.

## 2017-10-13 NOTE — ED PROVIDER NOTE - MEDICAL DECISION MAKING DETAILS
89F with dementia, sent for elevated WBC count; lungs CTA; abdomen soft, NT; pt with no acute complaints , stable VS and afebrile. Plan to check CBC/ CMP/ UA/ reeval.

## 2017-10-14 LAB
CULTURE RESULTS: SIGNIFICANT CHANGE UP
SPECIMEN SOURCE: SIGNIFICANT CHANGE UP

## 2017-10-19 PROCEDURE — 83880 ASSAY OF NATRIURETIC PEPTIDE: CPT

## 2017-10-19 PROCEDURE — 71045 X-RAY EXAM CHEST 1 VIEW: CPT

## 2017-10-19 PROCEDURE — 80053 COMPREHEN METABOLIC PANEL: CPT

## 2017-10-19 PROCEDURE — 85730 THROMBOPLASTIN TIME PARTIAL: CPT

## 2017-10-19 PROCEDURE — 84484 ASSAY OF TROPONIN QUANT: CPT

## 2017-10-19 PROCEDURE — 82553 CREATINE MB FRACTION: CPT

## 2017-10-19 PROCEDURE — 70450 CT HEAD/BRAIN W/O DYE: CPT

## 2017-10-19 PROCEDURE — 80048 BASIC METABOLIC PNL TOTAL CA: CPT

## 2017-10-19 PROCEDURE — 36415 COLL VENOUS BLD VENIPUNCTURE: CPT

## 2017-10-19 PROCEDURE — 97163 PT EVAL HIGH COMPLEX 45 MIN: CPT

## 2017-10-19 PROCEDURE — 82550 ASSAY OF CK (CPK): CPT

## 2017-10-19 PROCEDURE — 99285 EMERGENCY DEPT VISIT HI MDM: CPT | Mod: 25

## 2017-10-19 PROCEDURE — 81001 URINALYSIS AUTO W/SCOPE: CPT

## 2017-10-19 PROCEDURE — 85610 PROTHROMBIN TIME: CPT

## 2017-10-19 PROCEDURE — 72170 X-RAY EXAM OF PELVIS: CPT

## 2017-10-19 PROCEDURE — 87086 URINE CULTURE/COLONY COUNT: CPT

## 2017-10-19 PROCEDURE — 83735 ASSAY OF MAGNESIUM: CPT

## 2017-10-19 PROCEDURE — 85027 COMPLETE CBC AUTOMATED: CPT

## 2017-10-19 PROCEDURE — 84443 ASSAY THYROID STIM HORMONE: CPT

## 2017-10-23 ENCOUNTER — APPOINTMENT (OUTPATIENT)
Dept: FAMILY MEDICINE | Facility: CLINIC | Age: 82
End: 2017-10-23
Payer: MEDICARE

## 2017-10-23 VITALS
DIASTOLIC BLOOD PRESSURE: 50 MMHG | TEMPERATURE: 98.4 F | HEART RATE: 91 BPM | OXYGEN SATURATION: 96 % | SYSTOLIC BLOOD PRESSURE: 100 MMHG | WEIGHT: 111 LBS | RESPIRATION RATE: 18 BRPM | HEIGHT: 59 IN | BODY MASS INDEX: 22.38 KG/M2

## 2017-10-23 DIAGNOSIS — R79.89 OTHER SPECIFIED ABNORMAL FINDINGS OF BLOOD CHEMISTRY: ICD-10-CM

## 2017-10-23 DIAGNOSIS — E78.00 PURE HYPERCHOLESTEROLEMIA, UNSPECIFIED: ICD-10-CM

## 2017-10-23 DIAGNOSIS — R63.4 ABNORMAL WEIGHT LOSS: ICD-10-CM

## 2017-10-23 DIAGNOSIS — D64.9 ANEMIA, UNSPECIFIED: ICD-10-CM

## 2017-10-23 DIAGNOSIS — I10 ESSENTIAL (PRIMARY) HYPERTENSION: ICD-10-CM

## 2017-10-23 DIAGNOSIS — E16.2 HYPOGLYCEMIA, UNSPECIFIED: ICD-10-CM

## 2017-10-23 DIAGNOSIS — Z09 ENCOUNTER FOR FOLLOW-UP EXAMINATION AFTER COMPLETED TREATMENT FOR CONDITIONS OTHER THAN MALIGNANT NEOPLASM: ICD-10-CM

## 2017-10-23 PROCEDURE — 99215 OFFICE O/P EST HI 40 MIN: CPT

## 2017-10-23 RX ORDER — QUINAPRIL HYDROCHLORIDE 40 MG/1
40 TABLET, FILM COATED ORAL DAILY
Refills: 0 | Status: DISCONTINUED | COMMUNITY
End: 2017-10-23

## 2017-10-23 RX ORDER — METFORMIN HYDROCHLORIDE 500 MG/1
500 TABLET, COATED ORAL
Qty: 28 | Refills: 0 | Status: DISCONTINUED | COMMUNITY
Start: 2017-07-20 | End: 2017-10-23

## 2017-10-23 RX ORDER — EZETIMIBE 10 MG/1
10 TABLET ORAL
Refills: 0 | Status: DISCONTINUED | COMMUNITY
End: 2017-10-23

## 2017-10-23 RX ORDER — NEBIVOLOL HYDROCHLORIDE 5 MG/1
5 TABLET ORAL DAILY
Refills: 0 | Status: DISCONTINUED | COMMUNITY
End: 2017-10-23

## 2017-10-26 DIAGNOSIS — E55.9 VITAMIN D DEFICIENCY, UNSPECIFIED: ICD-10-CM

## 2017-10-26 PROBLEM — Z09 HOSPITAL DISCHARGE FOLLOW-UP: Status: ACTIVE | Noted: 2017-08-15

## 2018-02-11 ENCOUNTER — EMERGENCY (EMERGENCY)
Facility: HOSPITAL | Age: 83
LOS: 1 days | Discharge: HOME | End: 2018-02-11
Attending: STUDENT IN AN ORGANIZED HEALTH CARE EDUCATION/TRAINING PROGRAM | Admitting: STUDENT IN AN ORGANIZED HEALTH CARE EDUCATION/TRAINING PROGRAM
Payer: MEDICARE

## 2018-02-11 VITALS
SYSTOLIC BLOOD PRESSURE: 191 MMHG | RESPIRATION RATE: 18 BRPM | TEMPERATURE: 98 F | HEART RATE: 85 BPM | HEIGHT: 59 IN | WEIGHT: 119.93 LBS | OXYGEN SATURATION: 98 % | DIASTOLIC BLOOD PRESSURE: 90 MMHG

## 2018-02-11 DIAGNOSIS — Z98.42 CATARACT EXTRACTION STATUS, LEFT EYE: Chronic | ICD-10-CM

## 2018-02-11 DIAGNOSIS — Z98.41 CATARACT EXTRACTION STATUS, RIGHT EYE: Chronic | ICD-10-CM

## 2018-02-11 PROCEDURE — 99284 EMERGENCY DEPT VISIT MOD MDM: CPT

## 2018-02-11 NOTE — ED ADULT TRIAGE NOTE - CHIEF COMPLAINT QUOTE
pt BIBA from [facility] s/p fall unwitness pt noted with bump on the back of her head as per staff. as per staff pt acting normal. pt with dementia at baseline. pt alert at this time.

## 2018-02-12 ENCOUNTER — MOBILE ON CALL (OUTPATIENT)
Age: 83
End: 2018-02-12

## 2018-02-12 ENCOUNTER — EMERGENCY (EMERGENCY)
Facility: HOSPITAL | Age: 83
LOS: 1 days | Discharge: HOME | End: 2018-02-12
Attending: STUDENT IN AN ORGANIZED HEALTH CARE EDUCATION/TRAINING PROGRAM
Payer: MEDICARE

## 2018-02-12 VITALS
SYSTOLIC BLOOD PRESSURE: 201 MMHG | DIASTOLIC BLOOD PRESSURE: 74 MMHG | RESPIRATION RATE: 18 BRPM | TEMPERATURE: 98 F | HEART RATE: 98 BPM | OXYGEN SATURATION: 98 %

## 2018-02-12 VITALS
SYSTOLIC BLOOD PRESSURE: 161 MMHG | HEIGHT: 60 IN | WEIGHT: 115.08 LBS | DIASTOLIC BLOOD PRESSURE: 83 MMHG | TEMPERATURE: 98 F | OXYGEN SATURATION: 98 % | RESPIRATION RATE: 18 BRPM | HEART RATE: 82 BPM

## 2018-02-12 DIAGNOSIS — Z98.42 CATARACT EXTRACTION STATUS, LEFT EYE: Chronic | ICD-10-CM

## 2018-02-12 DIAGNOSIS — Z98.41 CATARACT EXTRACTION STATUS, RIGHT EYE: Chronic | ICD-10-CM

## 2018-02-12 LAB
APPEARANCE UR: CLEAR — SIGNIFICANT CHANGE UP
BASOPHILS # BLD AUTO: 0 K/UL — SIGNIFICANT CHANGE UP (ref 0–0.2)
BASOPHILS NFR BLD AUTO: 0.2 % — SIGNIFICANT CHANGE UP (ref 0–2)
BILIRUB UR-MCNC: NEGATIVE — SIGNIFICANT CHANGE UP
COLOR SPEC: YELLOW — SIGNIFICANT CHANGE UP
DIFF PNL FLD: NEGATIVE — SIGNIFICANT CHANGE UP
EOSINOPHIL # BLD AUTO: 0.1 K/UL — SIGNIFICANT CHANGE UP (ref 0–0.5)
EOSINOPHIL NFR BLD AUTO: 1.1 % — SIGNIFICANT CHANGE UP (ref 0–6)
EPI CELLS # UR: SIGNIFICANT CHANGE UP
GLUCOSE UR QL: NEGATIVE MG/DL — SIGNIFICANT CHANGE UP
HCT VFR BLD CALC: 33.4 % — LOW (ref 37–47)
HGB BLD-MCNC: 10.6 G/DL — LOW (ref 12–16)
KETONES UR-MCNC: NEGATIVE — SIGNIFICANT CHANGE UP
LEUKOCYTE ESTERASE UR-ACNC: ABNORMAL
LYMPHOCYTES # BLD AUTO: 18.3 % — LOW (ref 20–55)
LYMPHOCYTES # BLD AUTO: 2.1 K/UL — SIGNIFICANT CHANGE UP (ref 1–4.8)
MCHC RBC-ENTMCNC: 27.2 PG — SIGNIFICANT CHANGE UP (ref 27–31)
MCHC RBC-ENTMCNC: 31.7 G/DL — LOW (ref 32–36)
MCV RBC AUTO: 85.6 FL — SIGNIFICANT CHANGE UP (ref 81–99)
MONOCYTES # BLD AUTO: 0.8 K/UL — SIGNIFICANT CHANGE UP (ref 0–0.8)
MONOCYTES NFR BLD AUTO: 7 % — SIGNIFICANT CHANGE UP (ref 3–10)
NEUTROPHILS # BLD AUTO: 8.3 K/UL — HIGH (ref 1.8–8)
NEUTROPHILS NFR BLD AUTO: 73 % — SIGNIFICANT CHANGE UP (ref 37–73)
NITRITE UR-MCNC: NEGATIVE — SIGNIFICANT CHANGE UP
PH UR: 6.5 — SIGNIFICANT CHANGE UP (ref 5–8)
PLATELET # BLD AUTO: 230 K/UL — SIGNIFICANT CHANGE UP (ref 150–400)
PROT UR-MCNC: 30 MG/DL
RBC # BLD: 3.9 M/UL — LOW (ref 4.4–5.2)
RBC # FLD: 14.1 % — SIGNIFICANT CHANGE UP (ref 11–15.6)
RBC CASTS # UR COMP ASSIST: NEGATIVE /HPF — SIGNIFICANT CHANGE UP (ref 0–4)
SP GR SPEC: 1.01 — SIGNIFICANT CHANGE UP (ref 1.01–1.02)
TROPONIN T SERPL-MCNC: <0.01 NG/ML — SIGNIFICANT CHANGE UP (ref 0–0.06)
UROBILINOGEN FLD QL: NEGATIVE MG/DL — SIGNIFICANT CHANGE UP
WBC # BLD: 11.4 K/UL — HIGH (ref 4.8–10.8)
WBC # FLD AUTO: 11.4 K/UL — HIGH (ref 4.8–10.8)
WBC UR QL: SIGNIFICANT CHANGE UP

## 2018-02-12 PROCEDURE — 83735 ASSAY OF MAGNESIUM: CPT

## 2018-02-12 PROCEDURE — 99284 EMERGENCY DEPT VISIT MOD MDM: CPT | Mod: 25

## 2018-02-12 PROCEDURE — 70450 CT HEAD/BRAIN W/O DYE: CPT

## 2018-02-12 PROCEDURE — 82550 ASSAY OF CK (CPK): CPT

## 2018-02-12 PROCEDURE — 93010 ELECTROCARDIOGRAM REPORT: CPT

## 2018-02-12 PROCEDURE — 70450 CT HEAD/BRAIN W/O DYE: CPT | Mod: 26

## 2018-02-12 PROCEDURE — 85027 COMPLETE CBC AUTOMATED: CPT

## 2018-02-12 PROCEDURE — 84484 ASSAY OF TROPONIN QUANT: CPT

## 2018-02-12 PROCEDURE — 80307 DRUG TEST PRSMV CHEM ANLYZR: CPT

## 2018-02-12 PROCEDURE — 99284 EMERGENCY DEPT VISIT MOD MDM: CPT

## 2018-02-12 PROCEDURE — 81001 URINALYSIS AUTO W/SCOPE: CPT

## 2018-02-12 PROCEDURE — 36415 COLL VENOUS BLD VENIPUNCTURE: CPT

## 2018-02-12 PROCEDURE — 70450 CT HEAD/BRAIN W/O DYE: CPT | Mod: 26,77

## 2018-02-12 PROCEDURE — 80053 COMPREHEN METABOLIC PANEL: CPT

## 2018-02-12 PROCEDURE — 93005 ELECTROCARDIOGRAM TRACING: CPT

## 2018-02-12 RX ORDER — SODIUM CHLORIDE 9 MG/ML
3 INJECTION INTRAMUSCULAR; INTRAVENOUS; SUBCUTANEOUS ONCE
Qty: 0 | Refills: 0 | Status: COMPLETED | OUTPATIENT
Start: 2018-02-12 | End: 2018-02-12

## 2018-02-12 RX ADMIN — Medication 0.1 MILLIGRAM(S): at 03:20

## 2018-02-12 NOTE — ED ADULT NURSE NOTE - CHIEF COMPLAINT QUOTE
Pt BIBA from the [facility] c/o frequent falls. Pt states she fell yesterday and today, was seen in ED yesterday for same. Pt reports she hit her head. Denies LOC or blood thinners. Pt in no apparent distress.

## 2018-02-12 NOTE — ED ADULT TRIAGE NOTE - CHIEF COMPLAINT QUOTE
Pt BIBA from the [facility] c/o frequent falls. Pt states she fell yesterday and today, was seen in ED yesterday for same. Pt reports she hit her head. Denies LOC or blood thinners. Pt in no apparent distress.

## 2018-02-12 NOTE — ED PROVIDER NOTE - OBJECTIVE STATEMENT
89 y/o F w/ PMHx of CAD, HTN, HLD and DM presents to ED s/p fall today. Pt was seen at [facility] yesterday after falling out her bed at [facility]. She expressed no complaints at that time and [facility] staff reported no changes in behavior after the fall. Pt continues to express no complaints after today fall. Pt states she was in the room when she fixing her blinds and suddenly found herself on the floor. [facility] staff reported pt may have hit her head in the fall. Denies tiredness, weakness, abd pain, LOC, diarrhea, or any other complaints at this time. PCP: Dr. Live. 89 y/o F w/ PMHx of CAD, HTN, HLD and DM presents to ED s/p fall today. Pt was seen at [facility] yesterday after falling out her bed at [facility]. She expressed no complaints at that time and [facility] staff reported no changes in behavior after the fall. Pt continues to express no complaints after today fall. Pt states she was in the room when she fixing her blinds. She states she turned around and suddenly found herself on the floor. [facility] staff reported pt may have hit her head in the fall. Denies tiredness, weakness, abd pain, LOC, diarrhea, or any other complaints at this time. PCP: Dr. Live.

## 2018-02-12 NOTE — ED PROVIDER NOTE - OBJECTIVE STATEMENT
89 y/o female pt with PMHx CAD, DM, and HTN  presents to the ED BIBA from [facility] for a fall. Pt remembers falling and hitting her head. As per [facility] staff, pt was acting normal. Currently in ED pt feels fine and has no present symptoms. Denies LOC, weakness, N/V/D, fever, chills, SOB, CP, difficulty breathing, HA, diaphoresis, leg swelling, blurry vision or abd pain. No further complaints at this time. 89 y/o female pt with PMHx CAD, DM, and HTN  presents to the ED BIBA from [facility] for a fall. Pt remembers falling out of bed and hitting her head. As per [facility] staff, pt was acting normal. Currently in ED pt feels fine and has no present symptoms. Denies LOC, weakness, N/V/D, fever, chills, SOB, CP, difficulty breathing, HA, diaphoresis, leg swelling, blurry vision or abd pain. No further complaints at this time.

## 2018-02-12 NOTE — ED ADULT NURSE NOTE - OBJECTIVE STATEMENT
Pt comes from [facility] and states "I went to turn around and I fell and hit my head", pt with baseline disorientation and dementia, no obvious signs of trauma

## 2018-02-12 NOTE — ED ADULT NURSE NOTE - CHIEF COMPLAINT QUOTE
pt BIBA from [facility] s/p fall unwitness pt noted with bump on the back of her head as per staff. as per staff pt acting normal. pt with dementia at baseline. pt alert at this time.

## 2018-02-12 NOTE — ED PROVIDER NOTE - SKIN, MLM
Skin normal color for race, warm, dry and intact. No evidence of rash. Ecchymosis to the L hand over the 4th proximal metacarpal.

## 2018-02-13 VITALS
DIASTOLIC BLOOD PRESSURE: 78 MMHG | SYSTOLIC BLOOD PRESSURE: 145 MMHG | RESPIRATION RATE: 18 BRPM | OXYGEN SATURATION: 99 % | TEMPERATURE: 98 F

## 2018-02-13 LAB
ALBUMIN SERPL ELPH-MCNC: 3.6 G/DL — SIGNIFICANT CHANGE UP (ref 3.3–5.2)
ALP SERPL-CCNC: 76 U/L — SIGNIFICANT CHANGE UP (ref 40–120)
ALT FLD-CCNC: 7 U/L — SIGNIFICANT CHANGE UP
ANION GAP SERPL CALC-SCNC: 13 MMOL/L — SIGNIFICANT CHANGE UP (ref 5–17)
AST SERPL-CCNC: 15 U/L — SIGNIFICANT CHANGE UP
BILIRUB SERPL-MCNC: 0.4 MG/DL — SIGNIFICANT CHANGE UP (ref 0.4–2)
BUN SERPL-MCNC: 20 MG/DL — SIGNIFICANT CHANGE UP (ref 8–20)
CALCIUM SERPL-MCNC: 9.5 MG/DL — SIGNIFICANT CHANGE UP (ref 8.6–10.2)
CHLORIDE SERPL-SCNC: 96 MMOL/L — LOW (ref 98–107)
CK SERPL-CCNC: 34 U/L — SIGNIFICANT CHANGE UP (ref 25–170)
CO2 SERPL-SCNC: 24 MMOL/L — SIGNIFICANT CHANGE UP (ref 22–29)
CREAT SERPL-MCNC: 1.18 MG/DL — SIGNIFICANT CHANGE UP (ref 0.5–1.3)
ETHANOL SERPL-MCNC: <10 MG/DL — SIGNIFICANT CHANGE UP
GLUCOSE SERPL-MCNC: 139 MG/DL — HIGH (ref 70–115)
POTASSIUM SERPL-MCNC: 4.4 MMOL/L — SIGNIFICANT CHANGE UP (ref 3.5–5.3)
POTASSIUM SERPL-SCNC: 4.4 MMOL/L — SIGNIFICANT CHANGE UP (ref 3.5–5.3)
PROT SERPL-MCNC: 6.5 G/DL — LOW (ref 6.6–8.7)
SODIUM SERPL-SCNC: 133 MMOL/L — LOW (ref 135–145)

## 2018-05-08 RX ADMIN — HYDROMORPHONE HYDROCHLORIDE 2 MILLIGRAM(S): 2 INJECTION INTRAMUSCULAR; INTRAVENOUS; SUBCUTANEOUS at 23:32

## 2018-05-10 ENCOUNTER — TRANSCRIPTION ENCOUNTER (OUTPATIENT)
Age: 83
End: 2018-05-10

## 2018-05-10 ENCOUNTER — INPATIENT (INPATIENT)
Facility: HOSPITAL | Age: 83
LOS: 11 days | Discharge: NURSING, REHAB OR LONG-TERM CARE | DRG: 469 | End: 2018-05-22
Attending: HOSPITALIST | Admitting: INTERNAL MEDICINE
Payer: MEDICARE

## 2018-05-10 VITALS
HEIGHT: 62 IN | DIASTOLIC BLOOD PRESSURE: 68 MMHG | HEART RATE: 91 BPM | SYSTOLIC BLOOD PRESSURE: 187 MMHG | TEMPERATURE: 98 F | RESPIRATION RATE: 18 BRPM | WEIGHT: 149.91 LBS | OXYGEN SATURATION: 98 %

## 2018-05-10 DIAGNOSIS — F03.91 UNSPECIFIED DEMENTIA WITH BEHAVIORAL DISTURBANCE: ICD-10-CM

## 2018-05-10 DIAGNOSIS — S72.009A FRACTURE OF UNSPECIFIED PART OF NECK OF UNSPECIFIED FEMUR, INITIAL ENCOUNTER FOR CLOSED FRACTURE: ICD-10-CM

## 2018-05-10 DIAGNOSIS — Z71.89 OTHER SPECIFIED COUNSELING: ICD-10-CM

## 2018-05-10 DIAGNOSIS — H40.9 UNSPECIFIED GLAUCOMA: ICD-10-CM

## 2018-05-10 DIAGNOSIS — S72.001A FRACTURE OF UNSPECIFIED PART OF NECK OF RIGHT FEMUR, INITIAL ENCOUNTER FOR CLOSED FRACTURE: ICD-10-CM

## 2018-05-10 DIAGNOSIS — I10 ESSENTIAL (PRIMARY) HYPERTENSION: ICD-10-CM

## 2018-05-10 DIAGNOSIS — Z98.41 CATARACT EXTRACTION STATUS, RIGHT EYE: Chronic | ICD-10-CM

## 2018-05-10 DIAGNOSIS — R29.6 REPEATED FALLS: ICD-10-CM

## 2018-05-10 DIAGNOSIS — I25.10 ATHEROSCLEROTIC HEART DISEASE OF NATIVE CORONARY ARTERY WITHOUT ANGINA PECTORIS: ICD-10-CM

## 2018-05-10 DIAGNOSIS — Z98.42 CATARACT EXTRACTION STATUS, LEFT EYE: Chronic | ICD-10-CM

## 2018-05-10 LAB
APPEARANCE UR: CLEAR — SIGNIFICANT CHANGE UP
BACTERIA # UR AUTO: ABNORMAL
BILIRUB UR-MCNC: NEGATIVE — SIGNIFICANT CHANGE UP
COLOR SPEC: YELLOW — SIGNIFICANT CHANGE UP
DIFF PNL FLD: NEGATIVE — SIGNIFICANT CHANGE UP
EPI CELLS # UR: SIGNIFICANT CHANGE UP
GLUCOSE UR QL: NEGATIVE MG/DL — SIGNIFICANT CHANGE UP
HCT VFR BLD CALC: 33.5 % — LOW (ref 37–47)
HGB BLD-MCNC: 10.7 G/DL — LOW (ref 12–16)
KETONES UR-MCNC: NEGATIVE — SIGNIFICANT CHANGE UP
LEUKOCYTE ESTERASE UR-ACNC: ABNORMAL
MCHC RBC-ENTMCNC: 27 PG — SIGNIFICANT CHANGE UP (ref 27–31)
MCHC RBC-ENTMCNC: 31.9 G/DL — LOW (ref 32–36)
MCV RBC AUTO: 84.4 FL — SIGNIFICANT CHANGE UP (ref 81–99)
NITRITE UR-MCNC: NEGATIVE — SIGNIFICANT CHANGE UP
PH UR: 5 — SIGNIFICANT CHANGE UP (ref 5–8)
PLATELET # BLD AUTO: 248 K/UL — SIGNIFICANT CHANGE UP (ref 150–400)
PROT UR-MCNC: 30 MG/DL
RBC # BLD: 3.97 M/UL — LOW (ref 4.4–5.2)
RBC # FLD: 14.4 % — SIGNIFICANT CHANGE UP (ref 11–15.6)
SP GR SPEC: 1.01 — SIGNIFICANT CHANGE UP (ref 1.01–1.02)
UROBILINOGEN FLD QL: NEGATIVE MG/DL — SIGNIFICANT CHANGE UP
WBC # BLD: 10.8 K/UL — SIGNIFICANT CHANGE UP (ref 4.8–10.8)
WBC # FLD AUTO: 10.8 K/UL — SIGNIFICANT CHANGE UP (ref 4.8–10.8)
WBC UR QL: SIGNIFICANT CHANGE UP /HPF (ref 0–5)

## 2018-05-10 PROCEDURE — 99223 1ST HOSP IP/OBS HIGH 75: CPT | Mod: 57

## 2018-05-10 PROCEDURE — 99284 EMERGENCY DEPT VISIT MOD MDM: CPT

## 2018-05-10 PROCEDURE — 70450 CT HEAD/BRAIN W/O DYE: CPT | Mod: 26

## 2018-05-10 PROCEDURE — 99223 1ST HOSP IP/OBS HIGH 75: CPT

## 2018-05-10 PROCEDURE — 72125 CT NECK SPINE W/O DYE: CPT | Mod: 26

## 2018-05-10 PROCEDURE — 73502 X-RAY EXAM HIP UNI 2-3 VIEWS: CPT | Mod: 26,RT

## 2018-05-10 PROCEDURE — 71045 X-RAY EXAM CHEST 1 VIEW: CPT | Mod: 26

## 2018-05-10 RX ORDER — TRAMADOL HYDROCHLORIDE 50 MG/1
25 TABLET ORAL
Qty: 0 | Refills: 0 | Status: DISCONTINUED | OUTPATIENT
Start: 2018-05-10 | End: 2018-05-11

## 2018-05-10 RX ORDER — HYDROMORPHONE HYDROCHLORIDE 2 MG/ML
0.5 INJECTION INTRAMUSCULAR; INTRAVENOUS; SUBCUTANEOUS
Qty: 0 | Refills: 0 | Status: DISCONTINUED | OUTPATIENT
Start: 2018-05-10 | End: 2018-05-10

## 2018-05-10 RX ORDER — AMLODIPINE BESYLATE 2.5 MG/1
10 TABLET ORAL DAILY
Qty: 0 | Refills: 0 | Status: DISCONTINUED | OUTPATIENT
Start: 2018-05-10 | End: 2018-05-11

## 2018-05-10 RX ORDER — ASPIRIN/CALCIUM CARB/MAGNESIUM 324 MG
81 TABLET ORAL DAILY
Qty: 0 | Refills: 0 | Status: DISCONTINUED | OUTPATIENT
Start: 2018-05-10 | End: 2018-05-11

## 2018-05-10 RX ORDER — LISINOPRIL 2.5 MG/1
5 TABLET ORAL DAILY
Qty: 0 | Refills: 0 | Status: DISCONTINUED | OUTPATIENT
Start: 2018-05-10 | End: 2018-05-11

## 2018-05-10 RX ORDER — ACETAMINOPHEN 500 MG
1000 TABLET ORAL ONCE
Qty: 0 | Refills: 0 | Status: COMPLETED | OUTPATIENT
Start: 2018-05-10 | End: 2018-05-10

## 2018-05-10 RX ORDER — POTASSIUM CHLORIDE 20 MEQ
15 PACKET (EA) ORAL
Qty: 0 | Refills: 0 | COMMUNITY

## 2018-05-10 RX ORDER — HYDROMORPHONE HYDROCHLORIDE 2 MG/ML
0.5 INJECTION INTRAMUSCULAR; INTRAVENOUS; SUBCUTANEOUS
Qty: 0 | Refills: 0 | Status: DISCONTINUED | OUTPATIENT
Start: 2018-05-10 | End: 2018-05-11

## 2018-05-10 RX ORDER — SIMVASTATIN 20 MG/1
40 TABLET, FILM COATED ORAL AT BEDTIME
Qty: 0 | Refills: 0 | Status: DISCONTINUED | OUTPATIENT
Start: 2018-05-10 | End: 2018-05-11

## 2018-05-10 RX ORDER — HYDRALAZINE HCL 50 MG
5 TABLET ORAL EVERY 4 HOURS
Qty: 0 | Refills: 0 | Status: DISCONTINUED | OUTPATIENT
Start: 2018-05-10 | End: 2018-05-11

## 2018-05-10 RX ORDER — SERTRALINE 25 MG/1
100 TABLET, FILM COATED ORAL DAILY
Qty: 0 | Refills: 0 | Status: DISCONTINUED | OUTPATIENT
Start: 2018-05-10 | End: 2018-05-11

## 2018-05-10 RX ORDER — HALOPERIDOL DECANOATE 100 MG/ML
1 INJECTION INTRAMUSCULAR ONCE
Qty: 0 | Refills: 0 | Status: DISCONTINUED | OUTPATIENT
Start: 2018-05-10 | End: 2018-05-11

## 2018-05-10 RX ORDER — SENNA PLUS 8.6 MG/1
2 TABLET ORAL AT BEDTIME
Qty: 0 | Refills: 0 | Status: DISCONTINUED | OUTPATIENT
Start: 2018-05-10 | End: 2018-05-11

## 2018-05-10 RX ORDER — MEMANTINE HYDROCHLORIDE 10 MG/1
5 TABLET ORAL DAILY
Qty: 0 | Refills: 0 | Status: DISCONTINUED | OUTPATIENT
Start: 2018-05-10 | End: 2018-05-11

## 2018-05-10 RX ORDER — CEFAZOLIN SODIUM 1 G
2000 VIAL (EA) INJECTION ONCE
Qty: 0 | Refills: 0 | Status: DISCONTINUED | OUTPATIENT
Start: 2018-05-10 | End: 2018-05-11

## 2018-05-10 RX ORDER — LATANOPROST 0.05 MG/ML
1 SOLUTION/ DROPS OPHTHALMIC; TOPICAL AT BEDTIME
Qty: 0 | Refills: 0 | Status: DISCONTINUED | OUTPATIENT
Start: 2018-05-10 | End: 2018-05-11

## 2018-05-10 RX ORDER — ACETAMINOPHEN 500 MG
650 TABLET ORAL EVERY 6 HOURS
Qty: 0 | Refills: 0 | Status: DISCONTINUED | OUTPATIENT
Start: 2018-05-10 | End: 2018-05-11

## 2018-05-10 RX ORDER — SODIUM CHLORIDE 9 MG/ML
1000 INJECTION INTRAMUSCULAR; INTRAVENOUS; SUBCUTANEOUS
Qty: 0 | Refills: 0 | Status: DISCONTINUED | OUTPATIENT
Start: 2018-05-10 | End: 2018-05-11

## 2018-05-10 RX ORDER — LISINOPRIL 2.5 MG/1
5 TABLET ORAL ONCE
Qty: 0 | Refills: 0 | Status: COMPLETED | OUTPATIENT
Start: 2018-05-10 | End: 2018-05-10

## 2018-05-10 RX ORDER — HALOPERIDOL DECANOATE 100 MG/ML
1 INJECTION INTRAMUSCULAR
Qty: 0 | Refills: 0 | Status: DISCONTINUED | OUTPATIENT
Start: 2018-05-10 | End: 2018-05-11

## 2018-05-10 RX ORDER — SACCHAROMYCES BOULARDII 250 MG
250 POWDER IN PACKET (EA) ORAL
Qty: 0 | Refills: 0 | Status: DISCONTINUED | OUTPATIENT
Start: 2018-05-10 | End: 2018-05-11

## 2018-05-10 RX ORDER — HYDROMORPHONE HYDROCHLORIDE 2 MG/ML
0.5 INJECTION INTRAMUSCULAR; INTRAVENOUS; SUBCUTANEOUS ONCE
Qty: 0 | Refills: 0 | Status: DISCONTINUED | OUTPATIENT
Start: 2018-05-10 | End: 2018-05-10

## 2018-05-10 RX ORDER — ENOXAPARIN SODIUM 100 MG/ML
40 INJECTION SUBCUTANEOUS ONCE
Qty: 0 | Refills: 0 | Status: COMPLETED | OUTPATIENT
Start: 2018-05-10 | End: 2018-05-10

## 2018-05-10 RX ORDER — AMLODIPINE BESYLATE 2.5 MG/1
10 TABLET ORAL ONCE
Qty: 0 | Refills: 0 | Status: COMPLETED | OUTPATIENT
Start: 2018-05-10 | End: 2018-05-10

## 2018-05-10 RX ORDER — PIPERACILLIN AND TAZOBACTAM 4; .5 G/20ML; G/20ML
100 INJECTION, POWDER, LYOPHILIZED, FOR SOLUTION INTRAVENOUS
Qty: 0 | Refills: 0 | COMMUNITY

## 2018-05-10 RX ORDER — SACCHAROMYCES BOULARDII 250 MG
1 POWDER IN PACKET (EA) ORAL
Qty: 0 | Refills: 0 | COMMUNITY

## 2018-05-10 RX ADMIN — Medication 250 MILLIGRAM(S): at 17:33

## 2018-05-10 RX ADMIN — HYDROMORPHONE HYDROCHLORIDE 0.5 MILLIGRAM(S): 2 INJECTION INTRAMUSCULAR; INTRAVENOUS; SUBCUTANEOUS at 09:53

## 2018-05-10 RX ADMIN — Medication 10 MILLIGRAM(S): at 17:33

## 2018-05-10 RX ADMIN — SENNA PLUS 2 TABLET(S): 8.6 TABLET ORAL at 21:54

## 2018-05-10 RX ADMIN — LATANOPROST 1 DROP(S): 0.05 SOLUTION/ DROPS OPHTHALMIC; TOPICAL at 22:02

## 2018-05-10 RX ADMIN — HYDROMORPHONE HYDROCHLORIDE 0.5 MILLIGRAM(S): 2 INJECTION INTRAMUSCULAR; INTRAVENOUS; SUBCUTANEOUS at 14:08

## 2018-05-10 RX ADMIN — LISINOPRIL 5 MILLIGRAM(S): 2.5 TABLET ORAL at 13:04

## 2018-05-10 RX ADMIN — AMLODIPINE BESYLATE 10 MILLIGRAM(S): 2.5 TABLET ORAL at 11:15

## 2018-05-10 RX ADMIN — HYDROMORPHONE HYDROCHLORIDE 0.5 MILLIGRAM(S): 2 INJECTION INTRAMUSCULAR; INTRAVENOUS; SUBCUTANEOUS at 21:54

## 2018-05-10 RX ADMIN — AMLODIPINE BESYLATE 10 MILLIGRAM(S): 2.5 TABLET ORAL at 19:51

## 2018-05-10 RX ADMIN — HYDROMORPHONE HYDROCHLORIDE 0.5 MILLIGRAM(S): 2 INJECTION INTRAMUSCULAR; INTRAVENOUS; SUBCUTANEOUS at 14:20

## 2018-05-10 RX ADMIN — SIMVASTATIN 40 MILLIGRAM(S): 20 TABLET, FILM COATED ORAL at 21:54

## 2018-05-10 RX ADMIN — HYDROMORPHONE HYDROCHLORIDE 0.5 MILLIGRAM(S): 2 INJECTION INTRAMUSCULAR; INTRAVENOUS; SUBCUTANEOUS at 22:10

## 2018-05-10 RX ADMIN — HYDROMORPHONE HYDROCHLORIDE 0.5 MILLIGRAM(S): 2 INJECTION INTRAMUSCULAR; INTRAVENOUS; SUBCUTANEOUS at 14:50

## 2018-05-10 RX ADMIN — ENOXAPARIN SODIUM 40 MILLIGRAM(S): 100 INJECTION SUBCUTANEOUS at 14:17

## 2018-05-10 RX ADMIN — SODIUM CHLORIDE 85 MILLILITER(S): 9 INJECTION INTRAMUSCULAR; INTRAVENOUS; SUBCUTANEOUS at 14:09

## 2018-05-10 RX ADMIN — Medication 1000 MILLIGRAM(S): at 13:41

## 2018-05-10 RX ADMIN — HYDROMORPHONE HYDROCHLORIDE 0.5 MILLIGRAM(S): 2 INJECTION INTRAMUSCULAR; INTRAVENOUS; SUBCUTANEOUS at 10:20

## 2018-05-10 RX ADMIN — SODIUM CHLORIDE 85 MILLILITER(S): 9 INJECTION INTRAMUSCULAR; INTRAVENOUS; SUBCUTANEOUS at 21:54

## 2018-05-10 RX ADMIN — Medication 400 MILLIGRAM(S): at 13:11

## 2018-05-10 RX ADMIN — HYDROMORPHONE HYDROCHLORIDE 0.5 MILLIGRAM(S): 2 INJECTION INTRAMUSCULAR; INTRAVENOUS; SUBCUTANEOUS at 14:38

## 2018-05-10 NOTE — ED PROVIDER NOTE - OBJECTIVE STATEMENT
Pt is a 89 y/o F, with PMHx of dementia, CAD, DM, HLD, and HTN, presenting to the ED s/p unwitnessed fall this AM. Pt reports falling and hitting her head. She is unable to recall the mechanism of the fall secondary to dementia. Presents with R hip and pelvic pain. Denies neck pain and numbness/tingling/weakness when asked.

## 2018-05-10 NOTE — ED PROVIDER NOTE - PROGRESS NOTE DETAILS
pt trying to get out of bed and I explained to her need to not get up and she keeps trying Dr Abdul aware and he will add to his orders for pt safety enhanced supervision

## 2018-05-10 NOTE — H&P ADULT - PROBLEM SELECTOR PLAN 1
planned for OR in am  EKG/trop reviewed...recommend echo given hx CAD/CHF? per facility notes..  no absolute medical contraindication for proposed procedure. inherent risks of anesthesia and procedure.

## 2018-05-10 NOTE — H&P ADULT - ASSESSMENT
89 yo F w/ hx dementia, CAD s/p remote stents, left hip fracture with remote repair presents from Assisted Living facility for fall with resulting right hip pain.

## 2018-05-10 NOTE — PATIENT PROFILE ADULT. - VISION (WITH CORRECTIVE LENSES IF THE PATIENT USUALLY WEARS THEM):
wearseyeglasses/Partially impaired: cannot see medication labels or newsprint, but can see obstacles in path, and the surrounding layout; can count fingers at arm's length

## 2018-05-10 NOTE — ED PROVIDER NOTE - PMH
Coronary artery disease involving native coronary artery of native heart without angina pectoris    Dementia    DM (diabetes mellitus)    High cholesterol    HTN (hypertension)

## 2018-05-10 NOTE — ED PROVIDER NOTE - MEDICAL DECISION MAKING DETAILS
89 yo F s/p fall with possible pelvic/hip fracture. As she hit her head, will obtain CT head and C spine, XR of the pelvis and hip pain, and obtain labs.

## 2018-05-10 NOTE — H&P ADULT - HISTORY OF PRESENT ILLNESS
89 yo F w/ hx dementia, CAD s/p remote stents, left hip fracture with remote repair presents from Assisted Living facility for fall with resulting right hip pain.  In ER, found to have hip fracture.  Patient is acute confused, yelling "help me" and wants to walk. unable to obtain information/ROS.  all history obtained from chart and facility notes.     per son, patient has had multiple falls recently and at baseline, confused but able to ambulate with walker and assistance.

## 2018-05-10 NOTE — CONSULT NOTE ADULT - SUBJECTIVE AND OBJECTIVE BOX
Pt Name: KYLE ELAINE    MRN: [id number]      Patient is a 89y Female whom is pleasantly confused. I gathered all info from ER doctor and chart. Patient states she has pain and points to her right hip. Patient does not recall where she lives or if she fell. Patient cannot state where she is or whom is the current president. Patient says she saw a little mouse run across her bed.          REVIEW OF SYSTEMS      General: Patient unable to answer questions appropriately 	    Musculoskeletal:	 states pain pointing to right hip    ROS is otherwise negative.      PAST MEDICAL & SURGICAL HISTORY:  Dementia  Coronary artery disease involving native coronary artery of native heart without angina pectoris  High cholesterol  HTN (hypertension)  DM (diabetes mellitus)  S/P cataract extraction and insertion of intraocular lens, right  S/P cataract extraction and insertion of intraocular lens, left      Allergies: No Known Allergies      Medications: acetaminophen  IVPB. 1000 milliGRAM(s) IV Intermittent once PRN      FAMILY HISTORY:  No pertinent family history in first degree relatives  : non-contributory    Social History: Lives at [facility]                               10.7   10.8  )-----------( 248      ( 10 May 2018 09:11 )             33.5     05-10    136  |  102  |  37.0<H>  ----------------------------<  126<H>  3.9   |  17.0<L>  |  1.08    Ca    9.3      10 May 2018 11:48    TPro  7.3  /  Alb  4.1  /  TBili  <0.2<L>  /  DBili  x   /  AST  25  /  ALT  15  /  AlkPhos  80  05-10      PHYSICAL EXAM:    Vital Signs Last 24 Hrs  T(C): 36.4 (10 May 2018 11:10), Max: 36.4 (10 May 2018 08:10)  T(F): 97.5 (10 May 2018 11:10), Max: 97.6 (10 May 2018 08:10)  HR: 90 (10 May 2018 11:10) (88 - 91)  BP: 183/81 (10 May 2018 11:10) (183/81 - 204/80)  BP(mean): --  RR: 20 (10 May 2018 11:10) (18 - 20)  SpO2: 98% (10 May 2018 11:10) (98% - 99%)  Daily Height in cm: 157.48 (10 May 2018 08:10)    Daily     Appearance: Alert, responsive, in no acute distress. Patient oriented to self only.  Neurological: Sensation is grossly intact to light touch.   Skin: no rash on visible skin. Skin is clean, dry and intact. No bleeding. No abrasions. No ulcerations.  Vascular: 2+ distal pulses. Cap refill < 2 sec. No signs of venous insuffiencey or stasis. No extremity ulcerations. No cyanosis.  Musculoskeletal: Right Lower Extremity: Pain with attempted ROM of right hip. Calf soft and supple. No ecchymosis noted.     Imaging Studies:  EXAM:  XR HIP WITH PELV 2-3V RT                          PROCEDURE DATE:  05/10/2018          INTERPRETATION:  Radiographs of the right hip         CLINICAL INFORMATION:  Injury with   Pain.    TECHNIQUE:   AP and oblique views of the hip were obtained.  FINDINGS:   No prior exams are available for comparison.    Subcapital displaced fracture deformity noted. Visualized right   hemipelvis intact. No soft tissue abnormality is recognized. [Osseous   pelvis intact.]    IMPRESSION:    Displaced right hip subcapital fracture deformity            TAMMY EVANS M.D., ATTENDING RADIOLOGIST  This document has been electronically signed. May 10 2018 10:26AM      A/P:  Pt is a  89y Female with Patient is a 89y old  Female who presents with a chief complaint of  found to have    PLAN:   * NPO for OR tomorrow  * IV fluids ordered and to start once NPO  * Pre-operative ABX ordered  * Routine daily anticoagulation held for OR  * Single dose anticoaguation ordered  * Medical clearance requested for procedure  * Bed rest

## 2018-05-11 ENCOUNTER — RESULT REVIEW (OUTPATIENT)
Age: 83
End: 2018-05-11

## 2018-05-11 DIAGNOSIS — Z86.19 PERSONAL HISTORY OF OTHER INFECTIOUS AND PARASITIC DISEASES: ICD-10-CM

## 2018-05-11 DIAGNOSIS — F05 DELIRIUM DUE TO KNOWN PHYSIOLOGICAL CONDITION: ICD-10-CM

## 2018-05-11 LAB
ANION GAP SERPL CALC-SCNC: 18 MMOL/L — HIGH (ref 5–17)
BUN SERPL-MCNC: 30 MG/DL — HIGH (ref 8–20)
CALCIUM SERPL-MCNC: 9.1 MG/DL — SIGNIFICANT CHANGE UP (ref 8.6–10.2)
CHLORIDE SERPL-SCNC: 105 MMOL/L — SIGNIFICANT CHANGE UP (ref 98–107)
CO2 SERPL-SCNC: 17 MMOL/L — LOW (ref 22–29)
CREAT SERPL-MCNC: 1.05 MG/DL — SIGNIFICANT CHANGE UP (ref 0.5–1.3)
GLUCOSE BLDC GLUCOMTR-MCNC: 134 MG/DL — HIGH (ref 70–99)
GLUCOSE SERPL-MCNC: 106 MG/DL — SIGNIFICANT CHANGE UP (ref 70–115)
HBA1C BLD-MCNC: 6.1 % — HIGH (ref 4–5.6)
HCT VFR BLD CALC: 29.4 % — LOW (ref 37–47)
HGB BLD-MCNC: 9.2 G/DL — LOW (ref 12–16)
INR BLD: 1.16 RATIO — SIGNIFICANT CHANGE UP (ref 0.88–1.16)
MCHC RBC-ENTMCNC: 26.7 PG — LOW (ref 27–31)
MCHC RBC-ENTMCNC: 31.3 G/DL — LOW (ref 32–36)
MCV RBC AUTO: 85.2 FL — SIGNIFICANT CHANGE UP (ref 81–99)
PLATELET # BLD AUTO: 218 K/UL — SIGNIFICANT CHANGE UP (ref 150–400)
POTASSIUM SERPL-MCNC: 3.6 MMOL/L — SIGNIFICANT CHANGE UP (ref 3.5–5.3)
POTASSIUM SERPL-SCNC: 3.6 MMOL/L — SIGNIFICANT CHANGE UP (ref 3.5–5.3)
PROTHROM AB SERPL-ACNC: 12.8 SEC — HIGH (ref 9.8–12.7)
RBC # BLD: 3.45 M/UL — LOW (ref 4.4–5.2)
RBC # FLD: 14.6 % — SIGNIFICANT CHANGE UP (ref 11–15.6)
SODIUM SERPL-SCNC: 140 MMOL/L — SIGNIFICANT CHANGE UP (ref 135–145)
WBC # BLD: 10.4 K/UL — SIGNIFICANT CHANGE UP (ref 4.8–10.8)
WBC # FLD AUTO: 10.4 K/UL — SIGNIFICANT CHANGE UP (ref 4.8–10.8)

## 2018-05-11 PROCEDURE — 99233 SBSQ HOSP IP/OBS HIGH 50: CPT

## 2018-05-11 PROCEDURE — 73501 X-RAY EXAM HIP UNI 1 VIEW: CPT | Mod: 26,RT

## 2018-05-11 PROCEDURE — 27236 TREAT THIGH FRACTURE: CPT | Mod: AS,RT

## 2018-05-11 PROCEDURE — 90792 PSYCH DIAG EVAL W/MED SRVCS: CPT

## 2018-05-11 PROCEDURE — 72170 X-RAY EXAM OF PELVIS: CPT | Mod: 26

## 2018-05-11 PROCEDURE — 88311 DECALCIFY TISSUE: CPT | Mod: 26

## 2018-05-11 PROCEDURE — 99232 SBSQ HOSP IP/OBS MODERATE 35: CPT | Mod: 57

## 2018-05-11 PROCEDURE — 27236 TREAT THIGH FRACTURE: CPT | Mod: RT

## 2018-05-11 PROCEDURE — 88305 TISSUE EXAM BY PATHOLOGIST: CPT | Mod: 26

## 2018-05-11 RX ORDER — FENTANYL CITRATE 50 UG/ML
25 INJECTION INTRAVENOUS
Qty: 0 | Refills: 0 | Status: DISCONTINUED | OUTPATIENT
Start: 2018-05-11 | End: 2018-05-16

## 2018-05-11 RX ORDER — CEFAZOLIN SODIUM 1 G
2000 VIAL (EA) INJECTION
Qty: 0 | Refills: 0 | Status: COMPLETED | OUTPATIENT
Start: 2018-05-11 | End: 2018-05-12

## 2018-05-11 RX ORDER — CEFAZOLIN SODIUM 1 G
2000 VIAL (EA) INJECTION
Qty: 0 | Refills: 0 | Status: DISCONTINUED | OUTPATIENT
Start: 2018-05-11 | End: 2018-05-11

## 2018-05-11 RX ORDER — HYDRALAZINE HCL 50 MG
5 TABLET ORAL EVERY 4 HOURS
Qty: 0 | Refills: 0 | Status: DISCONTINUED | OUTPATIENT
Start: 2018-05-11 | End: 2018-05-16

## 2018-05-11 RX ORDER — LATANOPROST 0.05 MG/ML
1 SOLUTION/ DROPS OPHTHALMIC; TOPICAL AT BEDTIME
Qty: 0 | Refills: 0 | Status: DISCONTINUED | OUTPATIENT
Start: 2018-05-11 | End: 2018-05-22

## 2018-05-11 RX ORDER — FENTANYL CITRATE 50 UG/ML
25 INJECTION INTRAVENOUS
Qty: 0 | Refills: 0 | Status: DISCONTINUED | OUTPATIENT
Start: 2018-05-11 | End: 2018-05-11

## 2018-05-11 RX ORDER — ENOXAPARIN SODIUM 100 MG/ML
40 INJECTION SUBCUTANEOUS DAILY
Qty: 0 | Refills: 0 | Status: DISCONTINUED | OUTPATIENT
Start: 2018-05-12 | End: 2018-05-22

## 2018-05-11 RX ORDER — TRAMADOL HYDROCHLORIDE 50 MG/1
25 TABLET ORAL
Qty: 0 | Refills: 0 | Status: DISCONTINUED | OUTPATIENT
Start: 2018-05-11 | End: 2018-05-15

## 2018-05-11 RX ORDER — HYDROMORPHONE HYDROCHLORIDE 2 MG/ML
0.5 INJECTION INTRAMUSCULAR; INTRAVENOUS; SUBCUTANEOUS
Qty: 0 | Refills: 0 | Status: DISCONTINUED | OUTPATIENT
Start: 2018-05-11 | End: 2018-05-16

## 2018-05-11 RX ORDER — LISINOPRIL 2.5 MG/1
5 TABLET ORAL DAILY
Qty: 0 | Refills: 0 | Status: DISCONTINUED | OUTPATIENT
Start: 2018-05-11 | End: 2018-05-17

## 2018-05-11 RX ORDER — SERTRALINE 25 MG/1
100 TABLET, FILM COATED ORAL DAILY
Qty: 0 | Refills: 0 | Status: DISCONTINUED | OUTPATIENT
Start: 2018-05-11 | End: 2018-05-22

## 2018-05-11 RX ORDER — SACCHAROMYCES BOULARDII 250 MG
250 POWDER IN PACKET (EA) ORAL
Qty: 0 | Refills: 0 | Status: DISCONTINUED | OUTPATIENT
Start: 2018-05-11 | End: 2018-05-22

## 2018-05-11 RX ORDER — ONDANSETRON 8 MG/1
4 TABLET, FILM COATED ORAL ONCE
Qty: 0 | Refills: 0 | Status: DISCONTINUED | OUTPATIENT
Start: 2018-05-11 | End: 2018-05-11

## 2018-05-11 RX ORDER — MEMANTINE HYDROCHLORIDE 10 MG/1
5 TABLET ORAL DAILY
Qty: 0 | Refills: 0 | Status: DISCONTINUED | OUTPATIENT
Start: 2018-05-11 | End: 2018-05-22

## 2018-05-11 RX ORDER — SIMVASTATIN 20 MG/1
40 TABLET, FILM COATED ORAL AT BEDTIME
Qty: 0 | Refills: 0 | Status: DISCONTINUED | OUTPATIENT
Start: 2018-05-11 | End: 2018-05-22

## 2018-05-11 RX ORDER — AMLODIPINE BESYLATE 2.5 MG/1
10 TABLET ORAL DAILY
Qty: 0 | Refills: 0 | Status: DISCONTINUED | OUTPATIENT
Start: 2018-05-11 | End: 2018-05-22

## 2018-05-11 RX ORDER — SENNA PLUS 8.6 MG/1
2 TABLET ORAL AT BEDTIME
Qty: 0 | Refills: 0 | Status: DISCONTINUED | OUTPATIENT
Start: 2018-05-11 | End: 2018-05-22

## 2018-05-11 RX ORDER — HALOPERIDOL DECANOATE 100 MG/ML
1 INJECTION INTRAMUSCULAR
Qty: 0 | Refills: 0 | Status: DISCONTINUED | OUTPATIENT
Start: 2018-05-11 | End: 2018-05-17

## 2018-05-11 RX ADMIN — HYDROMORPHONE HYDROCHLORIDE 0.5 MILLIGRAM(S): 2 INJECTION INTRAMUSCULAR; INTRAVENOUS; SUBCUTANEOUS at 09:59

## 2018-05-11 RX ADMIN — FENTANYL CITRATE 25 MICROGRAM(S): 50 INJECTION INTRAVENOUS at 19:30

## 2018-05-11 RX ADMIN — HYDROMORPHONE HYDROCHLORIDE 0.5 MILLIGRAM(S): 2 INJECTION INTRAMUSCULAR; INTRAVENOUS; SUBCUTANEOUS at 14:57

## 2018-05-11 RX ADMIN — Medication 250 MILLIGRAM(S): at 06:31

## 2018-05-11 RX ADMIN — AMLODIPINE BESYLATE 10 MILLIGRAM(S): 2.5 TABLET ORAL at 06:31

## 2018-05-11 RX ADMIN — FENTANYL CITRATE 25 MICROGRAM(S): 50 INJECTION INTRAVENOUS at 22:01

## 2018-05-11 RX ADMIN — Medication 5 MILLIGRAM(S): at 15:27

## 2018-05-11 RX ADMIN — FENTANYL CITRATE 25 MICROGRAM(S): 50 INJECTION INTRAVENOUS at 19:47

## 2018-05-11 RX ADMIN — LATANOPROST 1 DROP(S): 0.05 SOLUTION/ DROPS OPHTHALMIC; TOPICAL at 21:36

## 2018-05-11 RX ADMIN — HALOPERIDOL DECANOATE 1 MILLIGRAM(S): 100 INJECTION INTRAMUSCULAR at 09:36

## 2018-05-11 RX ADMIN — HYDROMORPHONE HYDROCHLORIDE 0.5 MILLIGRAM(S): 2 INJECTION INTRAMUSCULAR; INTRAVENOUS; SUBCUTANEOUS at 09:29

## 2018-05-11 RX ADMIN — Medication 10 MILLIGRAM(S): at 06:31

## 2018-05-11 RX ADMIN — HYDROMORPHONE HYDROCHLORIDE 0.5 MILLIGRAM(S): 2 INJECTION INTRAMUSCULAR; INTRAVENOUS; SUBCUTANEOUS at 03:22

## 2018-05-11 RX ADMIN — HYDROMORPHONE HYDROCHLORIDE 0.5 MILLIGRAM(S): 2 INJECTION INTRAMUSCULAR; INTRAVENOUS; SUBCUTANEOUS at 15:27

## 2018-05-11 RX ADMIN — SENNA PLUS 2 TABLET(S): 8.6 TABLET ORAL at 21:36

## 2018-05-11 RX ADMIN — HYDROMORPHONE HYDROCHLORIDE 0.5 MILLIGRAM(S): 2 INJECTION INTRAMUSCULAR; INTRAVENOUS; SUBCUTANEOUS at 03:50

## 2018-05-11 RX ADMIN — HALOPERIDOL DECANOATE 1 MILLIGRAM(S): 100 INJECTION INTRAMUSCULAR at 21:36

## 2018-05-11 RX ADMIN — SIMVASTATIN 40 MILLIGRAM(S): 20 TABLET, FILM COATED ORAL at 21:37

## 2018-05-11 RX ADMIN — LISINOPRIL 5 MILLIGRAM(S): 2.5 TABLET ORAL at 06:31

## 2018-05-11 NOTE — BRIEF OPERATIVE NOTE - COMMENTS
post-op plan: WBAT, PT/OT, ancef per SCIP, LMWH or equivalent x 4 weeks, posterior hip precautions, f/u ortho 6/1 for wound check

## 2018-05-11 NOTE — PROGRESS NOTE ADULT - ASSESSMENT
89 yo F w/ hx dementia, CAD s/p remote stents, left hip fracture with remote repair presents from Assisted Living facility for fall with resulting right hip pain.    per son, patient treated for scabies at facility x 2 treatments.

## 2018-05-11 NOTE — PROGRESS NOTE ADULT - PROBLEM SELECTOR PLAN 7
d/w son over the phone.   advised to speak to orthopedics (PA also updated) re need for surgery as will need to consent for OR.

## 2018-05-11 NOTE — PROGRESS NOTE ADULT - SUBJECTIVE AND OBJECTIVE BOX
Patient seen and eval at bedside. Patient repeated a few times "No, it's too cold outside." I was unable to illicit any other information from patient. Patient seemed to be laying in bed comfortably.     Vital Signs Last 24 Hrs  T(C): 36.7 (11 May 2018 04:22), Max: 36.9 (10 May 2018 20:07)  T(F): 98 (11 May 2018 04:22), Max: 98.5 (10 May 2018 20:07)  HR: 95 (11 May 2018 04:22) (88 - 111)  BP: 154/68 (11 May 2018 04:22) (119/64 - 204/80)  RR: 17 (11 May 2018 04:22) (16 - 20)  SpO2: 96% (11 May 2018 04:22) (93% - 99%)    PE: NAD, awake  Right LE shortened, ER  DP pulse 2+, calf soft, NT B/L  PE limited by patient's AMS                          9.2    10.4  )-----------( 218      ( 11 May 2018 06:24 )             29.4     05-11    140  |  105  |  30.0<H>  ----------------------------<  106  3.6   |  17.0<L>  |  1.05      PT/INR - ( 11 May 2018 06:24 )   PT: 12.8 sec;   INR: 1.16 ratio         A/P: 89 yo F with right hip subcapital fracture  ·	OR today pending med and cardiac clearance  ·	Psyc consult pending for undiagnosed dementia  ·	NPO/IVF  ·	Pain control  ·	DVT propx: Hold anticoagulation   ·	NWB/bedrest Patient seen and eval at bedside. Patient repeated a few times "No, it's too cold outside." I was unable to illicit any other information from patient. Patient seemed to be laying in bed comfortably.     Vital Signs Last 24 Hrs  T(C): 36.7 (11 May 2018 04:22), Max: 36.9 (10 May 2018 20:07)  T(F): 98 (11 May 2018 04:22), Max: 98.5 (10 May 2018 20:07)  HR: 95 (11 May 2018 04:22) (88 - 111)  BP: 154/68 (11 May 2018 04:22) (119/64 - 204/80)  RR: 17 (11 May 2018 04:22) (16 - 20)  SpO2: 96% (11 May 2018 04:22) (93% - 99%)    PE: NAD, awake  Right LE shortened, ER  DP pulse 2+, calf soft, NT B/L  PE limited by patient's AMS                          9.2    10.4  )-----------( 218      ( 11 May 2018 06:24 )             29.4     05-11    140  |  105  |  30.0<H>  ----------------------------<  106  3.6   |  17.0<L>  |  1.05      PT/INR - ( 11 May 2018 06:24 )   PT: 12.8 sec;   INR: 1.16 ratio         A/P: 89 yo F with right hip subcapital fracture  ·	OR today pending cardiac clearance, echo done  ·	Med cleared  ·	Psyc consult pending for undiagnosed dementia  ·	NPO/IVF  ·	Pain control  ·	DVT propx: Hold anticoagulation   ·	NWB/bedrest

## 2018-05-11 NOTE — PROGRESS NOTE ADULT - SUBJECTIVE AND OBJECTIVE BOX
seen for dementia, hip fracture    patient yelling "help me".  agitated, combative.  pulling on lines  ros unable to obtain    MEDICATIONS  (STANDING):  amLODIPine   Tablet 10 milliGRAM(s) Oral daily  aspirin enteric coated 81 milliGRAM(s) Oral daily  busPIRone 10 milliGRAM(s) Oral two times a day  ceFAZolin  Injectable. 2000 milliGRAM(s) IV Push once  latanoprost 0.005% Ophthalmic Solution 1 Drop(s) Both EYES at bedtime  lisinopril 5 milliGRAM(s) Oral daily  memantine 5 milliGRAM(s) Oral daily  saccharomyces boulardii 250 milliGRAM(s) Oral two times a day  senna 2 Tablet(s) Oral at bedtime  sertraline 100 milliGRAM(s) Oral daily  simvastatin 40 milliGRAM(s) Oral at bedtime  sodium chloride 0.9%. 1000 milliLiter(s) (85 mL/Hr) IV Continuous <Continuous>    MEDICATIONS  (PRN):  acetaminophen   Tablet. 650 milliGRAM(s) Oral every 6 hours PRN Mild Pain (1 - 3)  bisacodyl Suppository 10 milliGRAM(s) Rectal daily PRN Constipation  haloperidol    Injectable 1 milliGRAM(s) IV Push once PRN Agitation  haloperidol    Injectable 1 milliGRAM(s) IntraMuscular four times a day PRN agitation  hydrALAZINE Injectable 5 milliGRAM(s) IV Push every 4 hours PRN sbp>170  HYDROmorphone  Injectable 0.5 milliGRAM(s) IV Push every 3 hours PRN severe/breakthrough pain  traMADol 25 milliGRAM(s) Oral four times a day PRN mod pain      Allergies    No Known Allergies    Vital Signs Last 24 Hrs  T(C): 36.7 (11 May 2018 04:22), Max: 36.9 (10 May 2018 20:07)  T(F): 98 (11 May 2018 04:22), Max: 98.5 (10 May 2018 20:07)  HR: 95 (11 May 2018 04:22) (95 - 111)  BP: 154/68 (11 May 2018 04:22) (119/64 - 164/76)  BP(mean): --  RR: 17 (11 May 2018 04:22) (16 - 18)  SpO2: 96% (11 May 2018 04:22) (93% - 96%)    PHYSICAL EXAM:    GENERAL: agitated  CHEST/LUNG: ctab--limited  HEART: Regular rate and rhythm; S1 S2  ABDOMEN: Soft, Nontender, Nondistended; Bowel sounds present  EXTREMITIES:  no edema  NERVOUS SYSTEM: agitated. moving all extremities spontaneously except RLE    LABS:                        9.2    10.4  )-----------( 218      ( 11 May 2018 06:24 )             29.4     05-11    140  |  105  |  30.0<H>  ----------------------------<  106  3.6   |  17.0<L>  |  1.05    Ca    9.1      11 May 2018 06:24    TPro  7.3  /  Alb  4.1  /  TBili  <0.2<L>  /  DBili  x   /  AST  25  /  ALT  15  /  AlkPhos  80  05-10    PT/INR - ( 11 May 2018 06:24 )   PT: 12.8 sec;   INR: 1.16 ratio           Urinalysis Basic - ( 10 May 2018 10:11 )    Color: Yellow / Appearance: Clear / S.015 / pH: x  Gluc: x / Ketone: Negative  / Bili: Negative / Urobili: Negative mg/dL   Blood: x / Protein: 30 mg/dL / Nitrite: Negative   Leuk Esterase: Small / RBC: x / WBC 15-20 /HPF   Sq Epi: x / Non Sq Epi: Few / Bacteria: Few        CAPILLARY BLOOD GLUCOSE            RADIOLOGY & ADDITIONAL TESTS:

## 2018-05-11 NOTE — BEHAVIORAL HEALTH ASSESSMENT NOTE - NSBHCHARTREVIEWVS_PSY_A_CORE FT
Vital Signs Last 24 Hrs  T(C): 36.7 (11 May 2018 09:14), Max: 36.9 (10 May 2018 20:07)  T(F): 98.1 (11 May 2018 09:14), Max: 98.5 (10 May 2018 20:07)  HR: 94 (11 May 2018 09:14) (94 - 111)  BP: 148/70 (11 May 2018 09:14) (119/64 - 164/76)  BP(mean): --  RR: 17 (11 May 2018 09:14) (16 - 18)  SpO2: 96% (11 May 2018 09:14) (93% - 96%)

## 2018-05-11 NOTE — BEHAVIORAL HEALTH ASSESSMENT NOTE - DETAILS
dementia made suicidal statement last month and was evaluated at [facility] unknown

## 2018-05-11 NOTE — BEHAVIORAL HEALTH ASSESSMENT NOTE - NSBHCHARTREVIEWINVESTIGATE_PSY_A_CORE FT
< from: 12 Lead ECG (05.10.18 @ 10:34) >      Ventricular Rate 94 BPM    Atrial Rate 94 BPM    P-R Interval 172 ms    QRS Duration 70 ms    Q-T Interval 384 ms    QTC Calculation(Bezet) 480 ms    P Axis 60 degrees    R Axis -7 degrees    T Axis 38 degrees    Diagnosis Line Normal sinus rhythm  Normal ECG    < end of copied text >

## 2018-05-11 NOTE — BRIEF OPERATIVE NOTE - PROCEDURE
<<-----Click on this checkbox to enter Procedure Hemiarthroplasty hip partial  05/11/2018  [employer] size 14 standard offset, press fit, 43 mm head, +0 neck  Active  MLINN

## 2018-05-11 NOTE — BEHAVIORAL HEALTH ASSESSMENT NOTE - SUMMARY
Patient is a 89 y/o  female, domiciled in an assisted living, with PMH of  CAD s/p remote stents, left hip fracture with remote repair presents from Assisted Living facility for fall with resulting right hip pain, found to have right hip fracture and is pending surgical repair. Psychiatry consulted for dementia. Patient is a poor historian, collateral received from son Ted Schultz. Patient currently has delirium superimposed on dementia. Contributing factors for delirium include pain, pain medication and change of environment.   Recommend  Continue Zoloft 100mg for now  continue Buspar 10mg bid  Continue Namenda 5 mg daily for now.   Continue behavioral modifications   psychiatry will continue to follow for coordination of care.

## 2018-05-11 NOTE — PROGRESS NOTE ADULT - SUBJECTIVE AND OBJECTIVE BOX
The patient is a 89y old female who presents to the [facility] after falling at 4 a.m. and   again at 6 a.m. in her nursing home.  She is scheduled to have a RIGHT HIP HEMIARTHROPLASTY.       The patient is possibly contaminated with scabies according to the nurse on the floor.   (10 May 2018 14:11)        PAST MEDICAL HISTORY:  Dementia  Coronary artery disease involving native coronary artery of native heart without angina pectoris  High cholesterol  Hyertension  Diabetes     PAST SURGICAL HISTORY:  Cataract extraction and insertion of intraocular lens, right  Cataract extraction and insertion of intraocular lens, left      MEDICATIONS  (STANDING):  amLODIPine   Tablet 10 milliGRAM(s) Oral daily  aspirin enteric coated 81 milliGRAM(s) Oral daily  busPIRone 10 milliGRAM(s) Oral two times a day  ceFAZolin  Injectable. 2000 milliGRAM(s) IV Push once  latanoprost 0.005% Ophthalmic Solution 1 Drop(s) Both EYES at bedtime  lisinopril 5 milliGRAM(s) Oral daily  memantine 5 milliGRAM(s) Oral daily  saccharomyces boulardii 250 milliGRAM(s) Oral two times a day  senna 2 Tablet(s) Oral at bedtime  sertraline 100 milliGRAM(s) Oral daily  simvastatin 40 milliGRAM(s) Oral at bedtime  sodium chloride 0.9%. 1000 milliLiter(s) (85 mL/Hr) IV Continuous <Continuous>    MEDICATIONS  (PRN):  acetaminophen   Tablet. 650 milliGRAM(s) Oral every 6 hours PRN Mild Pain (1 - 3)  bisacodyl Suppository 10 milliGRAM(s) Rectal daily PRN Constipation  haloperidol    Injectable 1 milliGRAM(s) IV Push once PRN Agitation  haloperidol    Injectable 1 milliGRAM(s) IntraMuscular four times a day PRN agitation  hydrALAZINE Injectable 5 milliGRAM(s) IV Push every 4 hours PRN sbp>170  HYDROmorphone  Injectable 0.5 milliGRAM(s) IV Push every 3 hours PRN severe/breakthrough pain  traMADol 25 milliGRAM(s) Oral four times a day PRN mod pain      Allergies:     No Known Drug Allergies    SOCIAL HISTORY:                        10.7   10.8  )-----------( 248      ( 10 May 2018 09:11 )             33.5     05-10    136  |  102  |  37.0<H>  ----------------------------<  126<H>  3.9   |  17.0<L>  |  1.08    Ca    9.3      10 May 2018 11:48    TPro  7.3  /  Alb  4.1  /  TBili  <0.2<L>  /  DBili  x   /  AST  25  /  ALT  15  /  AlkPhos  80  05-10      Height (cm): 157.48 (05-10 @ 08:10)  Weight (kg): 67.255129977 (05-10 @ 08:10)  BMI (kg/m2): 27.4 (05-10 @ 08:10)    EKG:  -  5/10/2018  Normal sinus rhythm  Normal ECG    TTE  -  5/10/2018    Summary:   1. Technically limited study secondary to patient's habitus   2. Left ventricular ejection fraction, by visual estimation, is >75%.   3. Hyperdynamic global left ventricular systolic function without   significant intracavitary gradient.   4. The mitral in-flow pattern reveals no discernable A-wave, therefore   no comment on diastolic function canbe made.   5. Moderate mitral annular calcification.   6. Sclerotic aortic valve with normal opening.   7. Mitral valve mean gradient is 4.0 mmHg consistent with mild mitral     ASA # = 3  Mallampati # = not assessed

## 2018-05-11 NOTE — BEHAVIORAL HEALTH ASSESSMENT NOTE - HPI (INCLUDE ILLNESS QUALITY, SEVERITY, DURATION, TIMING, CONTEXT, MODIFYING FACTORS, ASSOCIATED SIGNS AND SYMPTOMS)
Patient is a 89 y/o  female, domiciled in an assisted living, with Patient is a 89 y/o  female, domiciled in an assisted living, with PMH of  CAD s/p remote stents, left hip fracture with remote repair presents from Assisted Living facility for fall with resulting right hip pain, found to have right hip fracture and is pending surgical repair. Psychiatry consulted for dementia. Patient is a poor historian, collateral received from son Ted Schultz. Patient was diagnosed with dementia by PCP approximately 1 year ago. In October 2017 patient was admitted to [facility] from home s/p fall. She has been at the [facility] approximately 6 months. Patient has no h/o of suicide attempts, substance abuse or formal psychiatric treatment. Last month patient verbalized suicidal ideation to staff at [facility] and was evaluated at [facility] where she was a T[facility]R. Son is not sure when patient started Zoloft however denies she was taking this medication while living at home.. Patient is scheduled for possible THR today. She is currently oriented to name only, has pulled out tow IV's and is now on constant observation. Patient would not participate in eval and was viewed babbling in bed with her eyes close. Son reports at most recent baseline patient was able to recognize family members and would engage in some conversation. Patient usually maintains good behavioral control and family was able to take her to out of the [facility] to lunch. Patient has had several medical admissions in the past year with frequent episode of " sundowning."

## 2018-05-12 DIAGNOSIS — Z29.9 ENCOUNTER FOR PROPHYLACTIC MEASURES, UNSPECIFIED: ICD-10-CM

## 2018-05-12 PROCEDURE — 99233 SBSQ HOSP IP/OBS HIGH 50: CPT

## 2018-05-12 RX ADMIN — TRAMADOL HYDROCHLORIDE 25 MILLIGRAM(S): 50 TABLET ORAL at 09:14

## 2018-05-12 RX ADMIN — LATANOPROST 1 DROP(S): 0.05 SOLUTION/ DROPS OPHTHALMIC; TOPICAL at 21:43

## 2018-05-12 RX ADMIN — HYDROMORPHONE HYDROCHLORIDE 0.5 MILLIGRAM(S): 2 INJECTION INTRAMUSCULAR; INTRAVENOUS; SUBCUTANEOUS at 05:13

## 2018-05-12 RX ADMIN — Medication 250 MILLIGRAM(S): at 05:13

## 2018-05-12 RX ADMIN — Medication 2000 MILLIGRAM(S): at 08:32

## 2018-05-12 RX ADMIN — Medication 10 MILLIGRAM(S): at 18:01

## 2018-05-12 RX ADMIN — SERTRALINE 100 MILLIGRAM(S): 25 TABLET, FILM COATED ORAL at 18:01

## 2018-05-12 RX ADMIN — MEMANTINE HYDROCHLORIDE 5 MILLIGRAM(S): 10 TABLET ORAL at 18:01

## 2018-05-12 RX ADMIN — Medication 250 MILLIGRAM(S): at 18:01

## 2018-05-12 RX ADMIN — HYDROMORPHONE HYDROCHLORIDE 0.5 MILLIGRAM(S): 2 INJECTION INTRAMUSCULAR; INTRAVENOUS; SUBCUTANEOUS at 20:29

## 2018-05-12 RX ADMIN — HYDROMORPHONE HYDROCHLORIDE 0.5 MILLIGRAM(S): 2 INJECTION INTRAMUSCULAR; INTRAVENOUS; SUBCUTANEOUS at 03:47

## 2018-05-12 RX ADMIN — HALOPERIDOL DECANOATE 1 MILLIGRAM(S): 100 INJECTION INTRAMUSCULAR at 05:11

## 2018-05-12 RX ADMIN — Medication 2000 MILLIGRAM(S): at 00:41

## 2018-05-12 RX ADMIN — HALOPERIDOL DECANOATE 1 MILLIGRAM(S): 100 INJECTION INTRAMUSCULAR at 14:00

## 2018-05-12 RX ADMIN — TRAMADOL HYDROCHLORIDE 25 MILLIGRAM(S): 50 TABLET ORAL at 09:58

## 2018-05-12 RX ADMIN — ENOXAPARIN SODIUM 40 MILLIGRAM(S): 100 INJECTION SUBCUTANEOUS at 12:55

## 2018-05-12 RX ADMIN — Medication 10 MILLIGRAM(S): at 05:11

## 2018-05-12 RX ADMIN — SENNA PLUS 2 TABLET(S): 8.6 TABLET ORAL at 21:42

## 2018-05-12 RX ADMIN — HALOPERIDOL DECANOATE 1 MILLIGRAM(S): 100 INJECTION INTRAMUSCULAR at 20:27

## 2018-05-12 RX ADMIN — AMLODIPINE BESYLATE 10 MILLIGRAM(S): 2.5 TABLET ORAL at 05:11

## 2018-05-12 RX ADMIN — LISINOPRIL 5 MILLIGRAM(S): 2.5 TABLET ORAL at 05:11

## 2018-05-12 RX ADMIN — SIMVASTATIN 40 MILLIGRAM(S): 20 TABLET, FILM COATED ORAL at 21:42

## 2018-05-12 RX ADMIN — HYDROMORPHONE HYDROCHLORIDE 0.5 MILLIGRAM(S): 2 INJECTION INTRAMUSCULAR; INTRAVENOUS; SUBCUTANEOUS at 20:59

## 2018-05-12 RX ADMIN — TRAMADOL HYDROCHLORIDE 25 MILLIGRAM(S): 50 TABLET ORAL at 18:32

## 2018-05-12 NOTE — PROGRESS NOTE ADULT - SUBJECTIVE AND OBJECTIVE BOX
KYLE ELAINE    [id number]    89y      Female    INTERVAL HPI/OVERNIGHT EVENTS: Offers no complaints.    Hospital course:  89 yo F with h/o dementia, CAD presents from assisted living facility s/p fall. In the ED, found to have right comminuted femoral neck fracture. On 5/11, had partial hemiarthroplasty.     REVIEW OF SYSTEMS:    RESPIRATORY: No cough   CARDIOVASCULAR: No chest pain     Vital Signs Last 24 Hrs  T(C): 36.5 (12 May 2018 11:37), Max: 37.7 (11 May 2018 19:17)  T(F): 97.7 (12 May 2018 11:37), Max: 99.9 (11 May 2018 19:17)  HR: 100 (12 May 2018 11:37) (97 - 106)  BP: 156/70 (12 May 2018 11:37) (110/79 - 174/64)  BP(mean): --  RR: 18 (12 May 2018 11:37) (15 - 19)  SpO2: 96% (12 May 2018 05:15) (96% - 100%)    PHYSICAL EXAM:    GENERAL: NAD   HEENT: PERRL, +EOMI  CHEST/LUNG: Clear to percussion bilaterally  HEART: S1S2+   ABDOMEN: Soft, Nontender, Nondistended; Bowel sounds present  EXTREMITIES: right hip dressing c/d/i    LABS:                        9.2    10.4  )-----------( 218      ( 11 May 2018 06:24 )             29.4     05-11    140  |  105  |  30.0<H>  ----------------------------<  106  3.6   |  17.0<L>  |  1.05    Ca    9.1      11 May 2018 06:24    TPro  7.3  /  Alb  4.1  /  TBili  <0.2<L>  /  DBili  x   /  AST  25  /  ALT  15  /  AlkPhos  80  05-10    PT/INR - ( 11 May 2018 06:24 )   PT: 12.8 sec;   INR: 1.16 ratio                 MEDICATIONS  (STANDING):  amLODIPine   Tablet 10 milliGRAM(s) Oral daily  busPIRone 10 milliGRAM(s) Oral two times a day  enoxaparin Injectable 40 milliGRAM(s) SubCutaneous daily  latanoprost 0.005% Ophthalmic Solution 1 Drop(s) Both EYES at bedtime  lisinopril 5 milliGRAM(s) Oral daily  memantine 5 milliGRAM(s) Oral daily  saccharomyces boulardii 250 milliGRAM(s) Oral two times a day  senna 2 Tablet(s) Oral at bedtime  sertraline 100 milliGRAM(s) Oral daily  simvastatin 40 milliGRAM(s) Oral at bedtime    MEDICATIONS  (PRN):  bisacodyl Suppository 10 milliGRAM(s) Rectal daily PRN Constipation  fentaNYL    Injectable 25 MICROGram(s) IV Push every 10 minutes PRN Severe Pain (7 - 10)  haloperidol    Injectable 1 milliGRAM(s) IntraMuscular four times a day PRN agitation  hydrALAZINE Injectable 5 milliGRAM(s) IV Push every 4 hours PRN sbp>170  HYDROmorphone  Injectable 0.5 milliGRAM(s) IV Push every 3 hours PRN severe/breakthrough pain  traMADol 25 milliGRAM(s) Oral four times a day PRN mod pain      RADIOLOGY & ADDITIONAL TESTS:

## 2018-05-12 NOTE — PHYSICAL THERAPY INITIAL EVALUATION ADULT - CRITERIA FOR SKILLED THERAPEUTIC INTERVENTIONS
anticipated discharge recommendation/functional limitations in following categories/impairments found/rehab potential/anticipated equipment needs at discharge

## 2018-05-12 NOTE — PHYSICAL THERAPY INITIAL EVALUATION ADULT - ADDITIONAL COMMENTS
Pt. unable to state previous level of function; history from [facility] states that pt. would ambulate with rolling walker with assistance

## 2018-05-12 NOTE — PROGRESS NOTE ADULT - SUBJECTIVE AND OBJECTIVE BOX
KYLE ELAINE    [id number]    History: Patient is status post right posterior hip julián arthroplasty on POD#1. Patient does not recall having surgery, denies having any pain. Son present outside the room. Has not participated in physical therapy yet. No new complaints.                          9.2    10.4  )-----------( 218      ( 11 May 2018 06:24 )             29.4     05-11    140  |  105  |  30.0<H>  ----------------------------<  106  3.6   |  17.0<L>  |  1.05    Ca    9.1      11 May 2018 06:24        MEDICATIONS  (STANDING):  amLODIPine   Tablet 10 milliGRAM(s) Oral daily  busPIRone 10 milliGRAM(s) Oral two times a day  enoxaparin Injectable 40 milliGRAM(s) SubCutaneous daily  latanoprost 0.005% Ophthalmic Solution 1 Drop(s) Both EYES at bedtime  lisinopril 5 milliGRAM(s) Oral daily  memantine 5 milliGRAM(s) Oral daily  saccharomyces boulardii 250 milliGRAM(s) Oral two times a day  senna 2 Tablet(s) Oral at bedtime  sertraline 100 milliGRAM(s) Oral daily  simvastatin 40 milliGRAM(s) Oral at bedtime    MEDICATIONS  (PRN):  bisacodyl Suppository 10 milliGRAM(s) Rectal daily PRN Constipation  fentaNYL    Injectable 25 MICROGram(s) IV Push every 10 minutes PRN Severe Pain (7 - 10)  haloperidol    Injectable 1 milliGRAM(s) IntraMuscular four times a day PRN agitation  hydrALAZINE Injectable 5 milliGRAM(s) IV Push every 4 hours PRN sbp>170  HYDROmorphone  Injectable 0.5 milliGRAM(s) IV Push every 3 hours PRN severe/breakthrough pain  traMADol 25 milliGRAM(s) Oral four times a day PRN mod pain      Physical exam: The right hip dressing is clean, dry and intact. No drainage or discharge. No erythema is noted. No blistering. No ecchymosis. The calf is supple nontender. Sensation to light touch is grossly intact distally. Motor function distally is 5/5. No foot drop. 2+ dorsalis pedis pulse. Capillary refill is less than 2 seconds. No cyanosis.    Primary Orthopedic Assessment:  • s/p RIGHT POSTERIOR total hip replacement    Secondary  Orthopedic Assessment(s):   •     Secondary  Medical Assessment(s):   •     Plan:   • DVT prophylaxis as prescribed, including use of compression devices and ankle pumps  • Continue physical therapy  • Weightbearing as tolerated of the right lower extremity with assistance of a walker  • Incentive spirometry encouraged  • Pain control as clinically indicated  • Posterior hip precautions reviewed with patient  • Discharge planning – anticipated discharge is rehab

## 2018-05-13 PROCEDURE — 99232 SBSQ HOSP IP/OBS MODERATE 35: CPT

## 2018-05-13 RX ADMIN — AMLODIPINE BESYLATE 10 MILLIGRAM(S): 2.5 TABLET ORAL at 06:11

## 2018-05-13 RX ADMIN — HYDROMORPHONE HYDROCHLORIDE 0.5 MILLIGRAM(S): 2 INJECTION INTRAMUSCULAR; INTRAVENOUS; SUBCUTANEOUS at 18:29

## 2018-05-13 RX ADMIN — TRAMADOL HYDROCHLORIDE 25 MILLIGRAM(S): 50 TABLET ORAL at 21:32

## 2018-05-13 RX ADMIN — HYDROMORPHONE HYDROCHLORIDE 0.5 MILLIGRAM(S): 2 INJECTION INTRAMUSCULAR; INTRAVENOUS; SUBCUTANEOUS at 06:41

## 2018-05-13 RX ADMIN — LATANOPROST 1 DROP(S): 0.05 SOLUTION/ DROPS OPHTHALMIC; TOPICAL at 22:44

## 2018-05-13 RX ADMIN — SENNA PLUS 2 TABLET(S): 8.6 TABLET ORAL at 21:33

## 2018-05-13 RX ADMIN — HALOPERIDOL DECANOATE 1 MILLIGRAM(S): 100 INJECTION INTRAMUSCULAR at 21:32

## 2018-05-13 RX ADMIN — HYDROMORPHONE HYDROCHLORIDE 0.5 MILLIGRAM(S): 2 INJECTION INTRAMUSCULAR; INTRAVENOUS; SUBCUTANEOUS at 06:11

## 2018-05-13 RX ADMIN — Medication 250 MILLIGRAM(S): at 06:11

## 2018-05-13 RX ADMIN — HYDROMORPHONE HYDROCHLORIDE 0.5 MILLIGRAM(S): 2 INJECTION INTRAMUSCULAR; INTRAVENOUS; SUBCUTANEOUS at 23:14

## 2018-05-13 RX ADMIN — HYDROMORPHONE HYDROCHLORIDE 0.5 MILLIGRAM(S): 2 INJECTION INTRAMUSCULAR; INTRAVENOUS; SUBCUTANEOUS at 01:24

## 2018-05-13 RX ADMIN — LISINOPRIL 5 MILLIGRAM(S): 2.5 TABLET ORAL at 06:11

## 2018-05-13 RX ADMIN — ENOXAPARIN SODIUM 40 MILLIGRAM(S): 100 INJECTION SUBCUTANEOUS at 18:16

## 2018-05-13 RX ADMIN — HYDROMORPHONE HYDROCHLORIDE 0.5 MILLIGRAM(S): 2 INJECTION INTRAMUSCULAR; INTRAVENOUS; SUBCUTANEOUS at 22:44

## 2018-05-13 RX ADMIN — HYDROMORPHONE HYDROCHLORIDE 0.5 MILLIGRAM(S): 2 INJECTION INTRAMUSCULAR; INTRAVENOUS; SUBCUTANEOUS at 00:54

## 2018-05-13 RX ADMIN — Medication 10 MILLIGRAM(S): at 06:11

## 2018-05-13 RX ADMIN — SIMVASTATIN 40 MILLIGRAM(S): 20 TABLET, FILM COATED ORAL at 21:33

## 2018-05-13 NOTE — PROGRESS NOTE ADULT - SUBJECTIVE AND OBJECTIVE BOX
KYLE ELAINE    [id number]    89y      Female    INTERVAL HPI/OVERNIGHT EVENTS: Offers no complaints.    Hospital course:  89 yo F with h/o dementia, CAD presents from assisted living facility s/p fall. In the ED, found to have right comminuted femoral neck fracture. On 5/11, had partial hemiarthroplasty.       REVIEW OF SYSTEMS:     RESPIRATORY: No cough   CARDIOVASCULAR: No chest pain       Vital Signs Last 24 Hrs  T(C): 36.9 (13 May 2018 04:34), Max: 36.9 (13 May 2018 04:34)  T(F): 98.5 (13 May 2018 04:34), Max: 98.5 (13 May 2018 04:34)  HR: 94 (13 May 2018 04:34) (94 - 112)  BP: 147/60 (13 May 2018 04:34) (132/60 - 147/60)  BP(mean): --  RR: 17 (13 May 2018 04:34) (17 - 18)  SpO2: 97% (13 May 2018 04:34) (96% - 97%)    PHYSICAL EXAM:    GENERAL: NAD, frail, elderly  HEENT: PERRL, +EOMI, MMM  CHEST/LUNG: Clear to percussion bilaterally   HEART: S1S2+   ABDOMEN: Soft, Nontender, Nondistended; Bowel sounds present  EXTREMITIES: WWP    LABS:                  MEDICATIONS  (STANDING):  amLODIPine   Tablet 10 milliGRAM(s) Oral daily  busPIRone 10 milliGRAM(s) Oral two times a day  enoxaparin Injectable 40 milliGRAM(s) SubCutaneous daily  latanoprost 0.005% Ophthalmic Solution 1 Drop(s) Both EYES at bedtime  lisinopril 5 milliGRAM(s) Oral daily  memantine 5 milliGRAM(s) Oral daily  saccharomyces boulardii 250 milliGRAM(s) Oral two times a day  senna 2 Tablet(s) Oral at bedtime  sertraline 100 milliGRAM(s) Oral daily  simvastatin 40 milliGRAM(s) Oral at bedtime    MEDICATIONS  (PRN):  bisacodyl Suppository 10 milliGRAM(s) Rectal daily PRN Constipation  fentaNYL    Injectable 25 MICROGram(s) IV Push every 10 minutes PRN Severe Pain (7 - 10)  haloperidol    Injectable 1 milliGRAM(s) IntraMuscular four times a day PRN agitation  hydrALAZINE Injectable 5 milliGRAM(s) IV Push every 4 hours PRN sbp>170  HYDROmorphone  Injectable 0.5 milliGRAM(s) IV Push every 3 hours PRN severe/breakthrough pain  traMADol 25 milliGRAM(s) Oral four times a day PRN mod pain      RADIOLOGY & ADDITIONAL TESTS:

## 2018-05-13 NOTE — PROGRESS NOTE ADULT - SUBJECTIVE AND OBJECTIVE BOX
Patient seen and eval at bedside. Patient confused, yelling out random undistinguishable words. H/o dementia.     Vital Signs Last 24 Hrs  T(C): 36.9 (13 May 2018 04:34), Max: 36.9 (13 May 2018 04:34)  T(F): 98.5 (13 May 2018 04:34), Max: 98.5 (13 May 2018 04:34)  HR: 94 (13 May 2018 04:34) (94 - 112)  BP: 147/60 (13 May 2018 04:34) (132/60 - 147/60)  BP(mean): --  RR: 17 (13 May 2018 04:34) (17 - 18)  SpO2: 97% (13 May 2018 04:34) (96% - 97%)    PE: awake alert, confused  Right LE: Dressing C/D/I, no drainage or bleeding, no blisters  New sterile gauze dressing placed  Exam limited by patients AMS  DP pulse 1+, calf soft, NT  Plantar/dorsiflexion intact  Abduction pillow in place    labs pending    A/P: 89 yo F s/p left hip hemiarthroplasty POD#2, dementia  ·	Pain control - avoid narcotics if possible  ·	PT/OT - WBAT with posterior hip precautions  ·	DVT propx: lovenox  ·	Cont care as per primary team  ·	f/u labs

## 2018-05-14 ENCOUNTER — TRANSCRIPTION ENCOUNTER (OUTPATIENT)
Age: 83
End: 2018-05-14

## 2018-05-14 DIAGNOSIS — D50.0 IRON DEFICIENCY ANEMIA SECONDARY TO BLOOD LOSS (CHRONIC): ICD-10-CM

## 2018-05-14 DIAGNOSIS — R13.13 DYSPHAGIA, PHARYNGEAL PHASE: ICD-10-CM

## 2018-05-14 LAB
ANION GAP SERPL CALC-SCNC: 18 MMOL/L — HIGH (ref 5–17)
BLD GP AB SCN SERPL QL: SIGNIFICANT CHANGE UP
BUN SERPL-MCNC: 42 MG/DL — HIGH (ref 8–20)
CALCIUM SERPL-MCNC: 9.4 MG/DL — SIGNIFICANT CHANGE UP (ref 8.6–10.2)
CHLORIDE SERPL-SCNC: 112 MMOL/L — HIGH (ref 98–107)
CO2 SERPL-SCNC: 17 MMOL/L — LOW (ref 22–29)
CREAT SERPL-MCNC: 1.24 MG/DL — SIGNIFICANT CHANGE UP (ref 0.5–1.3)
GLUCOSE SERPL-MCNC: 159 MG/DL — HIGH (ref 70–115)
HCT VFR BLD CALC: 22 % — LOW (ref 37–47)
HGB BLD-MCNC: 7.1 G/DL — LOW (ref 12–16)
MAGNESIUM SERPL-MCNC: 2 MG/DL — SIGNIFICANT CHANGE UP (ref 1.8–2.6)
MCHC RBC-ENTMCNC: 27.3 PG — SIGNIFICANT CHANGE UP (ref 27–31)
MCHC RBC-ENTMCNC: 32.3 G/DL — SIGNIFICANT CHANGE UP (ref 32–36)
MCV RBC AUTO: 84.6 FL — SIGNIFICANT CHANGE UP (ref 81–99)
PLATELET # BLD AUTO: 219 K/UL — SIGNIFICANT CHANGE UP (ref 150–400)
POTASSIUM SERPL-MCNC: 3.2 MMOL/L — LOW (ref 3.5–5.3)
POTASSIUM SERPL-SCNC: 3.2 MMOL/L — LOW (ref 3.5–5.3)
RBC # BLD: 2.6 M/UL — LOW (ref 4.4–5.2)
RBC # FLD: 14.8 % — SIGNIFICANT CHANGE UP (ref 11–15.6)
SODIUM SERPL-SCNC: 147 MMOL/L — HIGH (ref 135–145)
TYPE + AB SCN PNL BLD: SIGNIFICANT CHANGE UP
WBC # BLD: 11.4 K/UL — HIGH (ref 4.8–10.8)
WBC # FLD AUTO: 11.4 K/UL — HIGH (ref 4.8–10.8)

## 2018-05-14 PROCEDURE — 99233 SBSQ HOSP IP/OBS HIGH 50: CPT

## 2018-05-14 PROCEDURE — 99232 SBSQ HOSP IP/OBS MODERATE 35: CPT

## 2018-05-14 RX ORDER — SODIUM CHLORIDE 9 MG/ML
1000 INJECTION, SOLUTION INTRAVENOUS
Qty: 0 | Refills: 0 | Status: DISCONTINUED | OUTPATIENT
Start: 2018-05-14 | End: 2018-05-14

## 2018-05-14 RX ORDER — POTASSIUM CHLORIDE 20 MEQ
10 PACKET (EA) ORAL
Qty: 0 | Refills: 0 | Status: COMPLETED | OUTPATIENT
Start: 2018-05-14 | End: 2018-05-14

## 2018-05-14 RX ADMIN — SIMVASTATIN 40 MILLIGRAM(S): 20 TABLET, FILM COATED ORAL at 22:02

## 2018-05-14 RX ADMIN — HYDROMORPHONE HYDROCHLORIDE 0.5 MILLIGRAM(S): 2 INJECTION INTRAMUSCULAR; INTRAVENOUS; SUBCUTANEOUS at 18:48

## 2018-05-14 RX ADMIN — HYDROMORPHONE HYDROCHLORIDE 0.5 MILLIGRAM(S): 2 INJECTION INTRAMUSCULAR; INTRAVENOUS; SUBCUTANEOUS at 12:20

## 2018-05-14 RX ADMIN — Medication 10 MILLIGRAM(S): at 06:24

## 2018-05-14 RX ADMIN — Medication 100 MILLIEQUIVALENT(S): at 13:52

## 2018-05-14 RX ADMIN — HYDROMORPHONE HYDROCHLORIDE 0.5 MILLIGRAM(S): 2 INJECTION INTRAMUSCULAR; INTRAVENOUS; SUBCUTANEOUS at 01:57

## 2018-05-14 RX ADMIN — Medication 5 MILLIGRAM(S): at 04:56

## 2018-05-14 RX ADMIN — LISINOPRIL 5 MILLIGRAM(S): 2.5 TABLET ORAL at 06:25

## 2018-05-14 RX ADMIN — HYDROMORPHONE HYDROCHLORIDE 0.5 MILLIGRAM(S): 2 INJECTION INTRAMUSCULAR; INTRAVENOUS; SUBCUTANEOUS at 16:27

## 2018-05-14 RX ADMIN — HYDROMORPHONE HYDROCHLORIDE 0.5 MILLIGRAM(S): 2 INJECTION INTRAMUSCULAR; INTRAVENOUS; SUBCUTANEOUS at 22:17

## 2018-05-14 RX ADMIN — HYDROMORPHONE HYDROCHLORIDE 0.5 MILLIGRAM(S): 2 INJECTION INTRAMUSCULAR; INTRAVENOUS; SUBCUTANEOUS at 18:39

## 2018-05-14 RX ADMIN — HYDROMORPHONE HYDROCHLORIDE 0.5 MILLIGRAM(S): 2 INJECTION INTRAMUSCULAR; INTRAVENOUS; SUBCUTANEOUS at 02:27

## 2018-05-14 RX ADMIN — HYDROMORPHONE HYDROCHLORIDE 0.5 MILLIGRAM(S): 2 INJECTION INTRAMUSCULAR; INTRAVENOUS; SUBCUTANEOUS at 15:57

## 2018-05-14 RX ADMIN — LATANOPROST 1 DROP(S): 0.05 SOLUTION/ DROPS OPHTHALMIC; TOPICAL at 22:02

## 2018-05-14 RX ADMIN — AMLODIPINE BESYLATE 10 MILLIGRAM(S): 2.5 TABLET ORAL at 06:25

## 2018-05-14 RX ADMIN — HYDROMORPHONE HYDROCHLORIDE 0.5 MILLIGRAM(S): 2 INJECTION INTRAMUSCULAR; INTRAVENOUS; SUBCUTANEOUS at 05:30

## 2018-05-14 RX ADMIN — ENOXAPARIN SODIUM 40 MILLIGRAM(S): 100 INJECTION SUBCUTANEOUS at 18:26

## 2018-05-14 RX ADMIN — Medication 100 MILLIEQUIVALENT(S): at 15:58

## 2018-05-14 RX ADMIN — Medication 100 MILLIEQUIVALENT(S): at 18:26

## 2018-05-14 RX ADMIN — SENNA PLUS 2 TABLET(S): 8.6 TABLET ORAL at 22:02

## 2018-05-14 RX ADMIN — HYDROMORPHONE HYDROCHLORIDE 0.5 MILLIGRAM(S): 2 INJECTION INTRAMUSCULAR; INTRAVENOUS; SUBCUTANEOUS at 23:15

## 2018-05-14 RX ADMIN — Medication 250 MILLIGRAM(S): at 06:25

## 2018-05-14 RX ADMIN — HYDROMORPHONE HYDROCHLORIDE 0.5 MILLIGRAM(S): 2 INJECTION INTRAMUSCULAR; INTRAVENOUS; SUBCUTANEOUS at 05:00

## 2018-05-14 RX ADMIN — HYDROMORPHONE HYDROCHLORIDE 0.5 MILLIGRAM(S): 2 INJECTION INTRAMUSCULAR; INTRAVENOUS; SUBCUTANEOUS at 12:50

## 2018-05-14 NOTE — PROGRESS NOTE ADULT - PROBLEM SELECTOR PLAN 3
supportive care  c/w buspirone and sertraline s/p partial hemiarthroplasty  PT eval noted - rehab  c/w pain control

## 2018-05-14 NOTE — PROGRESS NOTE BEHAVIORAL HEALTH - NSBHCHARTREVIEWVS_PSY_A_CORE FT
No apparent complications or complaints reguarding anesthesia care at this time Vital Signs Last 24 Hrs  T(C): 36.8 (14 May 2018 15:58), Max: 36.8 (13 May 2018 22:00)  T(F): 98.2 (14 May 2018 15:58), Max: 98.3 (13 May 2018 22:00)  HR: 111 (14 May 2018 15:58) (94 - 116)  BP: 154/65 (14 May 2018 15:58) (110/70 - 180/62)  BP(mean): --  RR: 20 (14 May 2018 15:58) (18 - 20)  SpO2: 100% (14 May 2018 04:45) (97% - 100%)

## 2018-05-14 NOTE — SWALLOW BEDSIDE ASSESSMENT ADULT - SLP PERTINENT HISTORY OF CURRENT PROBLEM
Pt is s/p left hip hemiarthroplasty POD #3, h/o dementia. Per RN Jennifer pt with very poor PO intake, refusal of PO. Small amount of puree accepted has resulted in +cough post swallow. Per RN pt with no baseline cough - only coughing after attempts at PO intake.

## 2018-05-14 NOTE — PROGRESS NOTE ADULT - SUBJECTIVE AND OBJECTIVE BOX
KYLE ELAINE    [id number]    89y      Female    INTERVAL HPI/OVERNIGHT EVENTS: Failed bedside speech therapy.     Hospital course:  89 yo F with h/o dementia, CAD presents from assisted living facility s/p fall. In the ED, found to have right comminuted femoral neck fracture. On 5/11, had partial hemiarthroplasty.       REVIEW OF SYSTEMS:  Unable to obtain due to pt's delirium      Vital Signs Last 24 Hrs  T(C): 36.8 (14 May 2018 10:55), Max: 36.8 (13 May 2018 22:00)  T(F): 98.3 (14 May 2018 10:55), Max: 98.3 (13 May 2018 22:00)  HR: 100 (14 May 2018 10:55) (94 - 116)  BP: 147/60 (14 May 2018 10:55) (109/65 - 180/62)  BP(mean): --  RR: 18 (14 May 2018 10:55) (18 - 18)  SpO2: 100% (14 May 2018 04:45) (97% - 100%)    PHYSICAL EXAM:    GENERAL: NAD   HEENT: MMM  CHEST/LUNG: Clear to percussion bilaterally; No wheezing  HEART: S1S2+, Regular rate and rhythm   ABDOMEN: Soft, Nontender, Nondistended; Bowel sounds present  EXTREMITIES:  no edema    LABS:                        7.1    11.4  )-----------( 219      ( 14 May 2018 06:53 )             22.0     05-14    147<H>  |  112<H>  |  42.0<H>  ----------------------------<  159<H>  3.2<L>   |  17.0<L>  |  1.24    Ca    9.4      14 May 2018 06:53  Mg     2.0     05-14              MEDICATIONS  (STANDING):  amLODIPine   Tablet 10 milliGRAM(s) Oral daily  busPIRone 10 milliGRAM(s) Oral two times a day  enoxaparin Injectable 40 milliGRAM(s) SubCutaneous daily  lactated ringers. 1000 milliLiter(s) (80 mL/Hr) IV Continuous <Continuous>  latanoprost 0.005% Ophthalmic Solution 1 Drop(s) Both EYES at bedtime  lisinopril 5 milliGRAM(s) Oral daily  memantine 5 milliGRAM(s) Oral daily  potassium chloride  10 mEq/100 mL IVPB 10 milliEquivalent(s) IV Intermittent every 1 hour  saccharomyces boulardii 250 milliGRAM(s) Oral two times a day  senna 2 Tablet(s) Oral at bedtime  sertraline 100 milliGRAM(s) Oral daily  simvastatin 40 milliGRAM(s) Oral at bedtime    MEDICATIONS  (PRN):  bisacodyl Suppository 10 milliGRAM(s) Rectal daily PRN Constipation  fentaNYL    Injectable 25 MICROGram(s) IV Push every 10 minutes PRN Severe Pain (7 - 10)  haloperidol    Injectable 1 milliGRAM(s) IntraMuscular four times a day PRN agitation  hydrALAZINE Injectable 5 milliGRAM(s) IV Push every 4 hours PRN sbp>170  HYDROmorphone  Injectable 0.5 milliGRAM(s) IV Push every 3 hours PRN severe/breakthrough pain  traMADol 25 milliGRAM(s) Oral four times a day PRN mod pain      RADIOLOGY & ADDITIONAL TESTS:

## 2018-05-14 NOTE — SWALLOW BEDSIDE ASSESSMENT ADULT - ORAL PREPARATORY PHASE
Reduced oral grading/significant reduced attention to bolus, +verbalizing with bolus in mouth , Reduced oral grading/significant reduced attention to bolus, +verbalizing with bolus in mouth/Anterior loss of bolus

## 2018-05-14 NOTE — DISCHARGE NOTE ADULT - MEDICATION SUMMARY - MEDICATIONS TO TAKE
I will START or STAY ON the medications listed below when I get home from the hospital:    aspirin 81 mg oral tablet, chewable  -- 1 tab(s) by mouth once a day  -- Indication: For Heart healtrh    fentaNYL 12 mcg/hr transdermal film, extended release  -- 1 patch by transdermal patch every 72 hours  -- Indication: For Pain    sertraline 100 mg oral tablet  -- 1 tab(s) by mouth once a day  -- Indication: For Depression    simvastatin 40 mg oral tablet  -- 1 tab(s) by mouth once a day (at bedtime)  -- Indication: For Hyperlioidmiea    risperiDONE 0.25 mg oral tablet, disintegrating  -- 1 tab(s) by mouth 2 times a day  -- Indication: For Dementia    busPIRone 10 mg oral tablet  -- 1 tab(s) by mouth 2 times a day  -- Indication: For Anxiety    Xanax 0.25 mg oral tablet  -- 1 tab(s) by mouth once a day, As Needed  -- Indication: For Anxiety    metoprolol tartrate 25 mg oral tablet  -- 0.5 tab(s) by mouth 2 times a day   -- It is very important that you take or use this exactly as directed.  Do not skip doses or discontinue unless directed by your doctor.  May cause drowsiness.  Alcohol may intensify this effect.  Use care when operating dangerous machinery.  Some non-prescription drugs may aggravate your condition.  Read all labels carefully.  If a warning appears, check with your doctor before taking.  Take with food or milk.  This drug may impair the ability to drive or operate machinery.  Use care until you become familiar with its effects.    -- Indication: For Htn    amLODIPine 10 mg oral tablet  -- 1 tab(s) by mouth once a day  -- Indication: For Htn    lidocaine 5% topical film  -- Apply on skin to affected area  -- Indication: For Pain    ferrous sulfate 325 mg (65 mg elemental iron) oral tablet  -- 1 tab(s) by mouth 2 times a day  -- Indication: For Anemia due to blood loss    Mag-Ox 400  -- 1 tab(s) by mouth 3 times a day Stop 8/22/17  -- Indication: For replacemtrns    memantine 5 mg oral tablet  -- 1 tab(s) by mouth once a day  -- Indication: For Dementia    Travatan 0.004% ophthalmic solution  -- 1 drop(s) to each affected eye once a day (in the evening)  -- Indication: For Gluaocoma    Vitamin B12 1000 mcg oral tablet  -- 1 tab(s) by mouth once a day  -- Indication: For vit b12 I will START or STAY ON the medications listed below when I get home from the hospital:    aspirin 81 mg oral tablet, chewable  -- 1 tab(s) by mouth once a day  -- Indication: For Heart healtrh    fentaNYL 12 mcg/hr transdermal film, extended release  -- 1 patch by transdermal patch every 72 hours  -- Indication: For Pain    HYDROmorphone 2 mg oral tablet  -- 1 tab(s) by mouth every 6 hours  -- Indication: For Pauib    HYDROmorphone 2 mg oral tablet  -- 1 tab(s) by mouth every 4 hours, As needed, Severe Pain (7 - 10)  -- Indication: For Pain    sertraline 100 mg oral tablet  -- 1 tab(s) by mouth once a day  -- Indication: For Depression    simvastatin 40 mg oral tablet  -- 1 tab(s) by mouth once a day (at bedtime)  -- Indication: For Hyperlioidmiea    QUEtiapine 25 mg oral tablet  -- 1 tab(s) by mouth once a day (at bedtime)  -- Indication: For sleep dementia    risperiDONE 0.25 mg oral tablet, disintegrating  -- 1 tab(s) by mouth 2 times a day  -- Indication: For Dementia    busPIRone 10 mg oral tablet  -- 1 tab(s) by mouth 2 times a day  -- Indication: For Anxiety    Xanax 0.25 mg oral tablet  -- 1 tab(s) by mouth once a day, As Needed  -- Indication: For Anxiety    metoprolol tartrate 25 mg oral tablet  -- 0.5 tab(s) by mouth 2 times a day   -- It is very important that you take or use this exactly as directed.  Do not skip doses or discontinue unless directed by your doctor.  May cause drowsiness.  Alcohol may intensify this effect.  Use care when operating dangerous machinery.  Some non-prescription drugs may aggravate your condition.  Read all labels carefully.  If a warning appears, check with your doctor before taking.  Take with food or milk.  This drug may impair the ability to drive or operate machinery.  Use care until you become familiar with its effects.    -- Indication: For Htn    amLODIPine 10 mg oral tablet  -- 1 tab(s) by mouth once a day  -- Indication: For Htn    lidocaine 5% topical film  -- Apply on skin to affected area  -- Indication: For Pain    ferrous sulfate 325 mg (65 mg elemental iron) oral tablet  -- 1 tab(s) by mouth 2 times a day  -- Indication: For Anemia due to blood loss    Mag-Ox 400  -- 1 tab(s) by mouth 3 times a day Stop 8/22/17  -- Indication: For replacemtrns    memantine 5 mg oral tablet  -- 1 tab(s) by mouth once a day  -- Indication: For Dementia    Travatan 0.004% ophthalmic solution  -- 1 drop(s) to each affected eye once a day (in the evening)  -- Indication: For Gluaocoma    Vitamin B12 1000 mcg oral tablet  -- 1 tab(s) by mouth once a day  -- Indication: For vit b12 I will START or STAY ON the medications listed below when I get home from the hospital:    aspirin 81 mg oral tablet, chewable  -- 1 tab(s) by mouth once a day  -- Indication: For Heart healtrh    HYDROmorphone 2 mg oral tablet  -- 1 tab(s) by mouth every 6 hours  -- Indication: For Pauib    HYDROmorphone 2 mg oral tablet  -- 1 tab(s) by mouth every 4 hours, As needed, Severe Pain (7 - 10)  -- Indication: For Pain    fentaNYL 25 mcg/hr transdermal film, extended release  -- Apply on skin to affected area every 72 hours MDD:1  -- Caution federal law prohibits the transfer of this drug to any person other  than the person for whom it was prescribed.  Do not use unless you have been treated with another narcotic pain medicine and you are tolerant to it.  For external use only.  It is very important that you take or use this exactly as directed.  Do not skip doses or discontinue unless directed by your doctor.  May cause drowsiness.  Alcohol may intensify this effect.  Use care when operating dangerous machinery.  Remove old patch prior to applying a new patch.  This prescription cannot be refilled.  Using more of this medication than prescribed may cause serious breathing problems.    -- Indication: For Pain    sertraline 100 mg oral tablet  -- 1 tab(s) by mouth once a day  -- Indication: For Depression    simvastatin 40 mg oral tablet  -- 1 tab(s) by mouth once a day (at bedtime)  -- Indication: For Hyperlioidmiea    QUEtiapine 25 mg oral tablet  -- 1 tab(s) by mouth once a day (at bedtime)  -- Indication: For sleep dementia    risperiDONE 0.25 mg oral tablet, disintegrating  -- 1 tab(s) by mouth 2 times a day  -- Indication: For Dementia    busPIRone 10 mg oral tablet  -- 1 tab(s) by mouth 2 times a day  -- Indication: For Anxiety    Xanax 0.25 mg oral tablet  -- 1 tab(s) by mouth once a day, As Needed  -- Indication: For Anxiety    metoprolol tartrate 25 mg oral tablet  -- 0.5 tab(s) by mouth 2 times a day   -- It is very important that you take or use this exactly as directed.  Do not skip doses or discontinue unless directed by your doctor.  May cause drowsiness.  Alcohol may intensify this effect.  Use care when operating dangerous machinery.  Some non-prescription drugs may aggravate your condition.  Read all labels carefully.  If a warning appears, check with your doctor before taking.  Take with food or milk.  This drug may impair the ability to drive or operate machinery.  Use care until you become familiar with its effects.    -- Indication: For Htn    amLODIPine 10 mg oral tablet  -- 1 tab(s) by mouth once a day  -- Indication: For Htn    lidocaine 5% topical film  -- Apply on skin to affected area  -- Indication: For Pain    ferrous sulfate 325 mg (65 mg elemental iron) oral tablet  -- 1 tab(s) by mouth 2 times a day  -- Indication: For Anemia due to blood loss    Mag-Ox 400  -- 1 tab(s) by mouth 3 times a day Stop 8/22/17  -- Indication: For replacemtrns    memantine 5 mg oral tablet  -- 1 tab(s) by mouth once a day  -- Indication: For Dementia    Travatan 0.004% ophthalmic solution  -- 1 drop(s) to each affected eye once a day (in the evening)  -- Indication: For Gluaocoma    Vitamin B12 1000 mcg oral tablet  -- 1 tab(s) by mouth once a day  -- Indication: For vit b12 I will START or STAY ON the medications listed below when I get home from the hospital:    aspirin 81 mg oral tablet, chewable  -- 1 tab(s) by mouth once a day  -- Indication: For Heart healtrh    HYDROmorphone 2 mg oral tablet  -- 1 tab(s) by mouth every 6 hours  -- Indication: For Pauib    HYDROmorphone 2 mg oral tablet  -- 1 tab(s) by mouth every 4 hours, As needed, Severe Pain (7 - 10)  -- Indication: For Pain    fentaNYL 25 mcg/hr transdermal film, extended release  -- Apply on skin to affected area every 72 hours MDD:1  -- Caution federal law prohibits the transfer of this drug to any person other  than the person for whom it was prescribed.  Do not use unless you have been treated with another narcotic pain medicine and you are tolerant to it.  For external use only.  It is very important that you take or use this exactly as directed.  Do not skip doses or discontinue unless directed by your doctor.  May cause drowsiness.  Alcohol may intensify this effect.  Use care when operating dangerous machinery.  Remove old patch prior to applying a new patch.  This prescription cannot be refilled.  Using more of this medication than prescribed may cause serious breathing problems.    -- Indication: For Pain    Lovenox 40 mg/0.4 mL injectable solution  -- 40 milligram(s) subcutaneously once a day   -- It is very important that you take or use this exactly as directed.  Do not skip doses or discontinue unless directed by your doctor.    -- Indication: For Dvt prophylaxis    sertraline 100 mg oral tablet  -- 1 tab(s) by mouth once a day  -- Indication: For Depression    simvastatin 40 mg oral tablet  -- 1 tab(s) by mouth once a day (at bedtime)  -- Indication: For Hyperlioidmiea    QUEtiapine 25 mg oral tablet  -- 1 tab(s) by mouth once a day (at bedtime)  -- Indication: For sleep dementia    risperiDONE 0.25 mg oral tablet, disintegrating  -- 1 tab(s) by mouth 2 times a day  -- Indication: For Dementia    busPIRone 10 mg oral tablet  -- 1 tab(s) by mouth 2 times a day  -- Indication: For Anxiety    Xanax 0.25 mg oral tablet  -- 1 tab(s) by mouth once a day, As Needed  -- Indication: For Anxiety    metoprolol tartrate 25 mg oral tablet  -- 0.5 tab(s) by mouth 2 times a day   -- It is very important that you take or use this exactly as directed.  Do not skip doses or discontinue unless directed by your doctor.  May cause drowsiness.  Alcohol may intensify this effect.  Use care when operating dangerous machinery.  Some non-prescription drugs may aggravate your condition.  Read all labels carefully.  If a warning appears, check with your doctor before taking.  Take with food or milk.  This drug may impair the ability to drive or operate machinery.  Use care until you become familiar with its effects.    -- Indication: For Htn    amLODIPine 10 mg oral tablet  -- 1 tab(s) by mouth once a day  -- Indication: For Htn    lidocaine 5% topical film  -- Apply on skin to affected area  -- Indication: For Pain    ferrous sulfate 325 mg (65 mg elemental iron) oral tablet  -- 1 tab(s) by mouth 2 times a day  -- Indication: For Anemia due to blood loss    Mag-Ox 400  -- 1 tab(s) by mouth 3 times a day Stop 8/22/17  -- Indication: For replacemtrns    memantine 5 mg oral tablet  -- 1 tab(s) by mouth once a day  -- Indication: For Dementia    Travatan 0.004% ophthalmic solution  -- 1 drop(s) to each affected eye once a day (in the evening)  -- Indication: For Gluaocoma    Vitamin B12 1000 mcg oral tablet  -- 1 tab(s) by mouth once a day  -- Indication: For vit b12

## 2018-05-14 NOTE — DISCHARGE NOTE ADULT - PLAN OF CARE
Improved function and pain control The patient will be seen in the office on 6/1/2018 for wound check. Sutures/Staples/Tape will be removed at that time. Patient may shower after post-op day #3. The dressing is to be removed on day # 5/201/2018. The patient will contact the office if the wound becomes red, has increasing pain, develops bleeding or discharge, an injury occurs, or has other concerns. The patient will continue PT consistent with posterior total hip replacement protocol. The patient will continue to practice posterior total hip precautions for a minimum of 6 week. The patient will continue LOVENOX for 4 weeks for clot prevention. The patient will take oxycodone or Tylenol for pain control and titrate according to prescription and patient needs. The patient is FULL weight bearing.      ///////////////////////////////POSTERIOR TOTAL HIP PRECAUTIONS///////////////////////////////////////////    *Remember to continue all of the precautions for total hip replacement. Your surgeon will tell you when and if you can move beyond these limitations.  • Do not bend your hip more than 90 degrees   • Do not cross your legs or ankles when laying sitting or standing.  • Do not raise your operated leg up with your knee straight.  • DO NOT bend over at your waist.  • Avoid sitting in low, soft chairs such as sofas and car seats. You should sit on a chair using firm pillows to raise the height  of the seat.  • Make sure your bed level is high, so that you maintain proper leg positioning when sitting on the side, or getting in or out.  procedures.  • Avoid sitting in low, soft chairs, such as sofas, easy chairs etc.   • When entering and traveling by car:      -Sit in the front passenger seat. Make sure that the car seat is all the way back and semi-reclined before entering.  • Do not allow your knees to come together when sitting or lying in bed.  • Do not take a tub bath yet.   • Do not resume driving until you have your surgeon’s permission. comeplted unasyn home meds back at [facility] supportice care supportive care s/p 1 unit prbc iron pills

## 2018-05-14 NOTE — SWALLOW BEDSIDE ASSESSMENT ADULT - ASR SWALLOW ASPIRATION MONITOR
position upright (89Y)/fever/throat clearing/change of breathing pattern/gurgly voice/oral hygiene/pneumonia/cough

## 2018-05-14 NOTE — SWALLOW BEDSIDE ASSESSMENT ADULT - SWALLOW EVAL: DIAGNOSIS
Moderate-severe oral dysphagia confounded by significantly reduced cognition. Suspect pharyngeal dysphagia for all administered consistencies

## 2018-05-14 NOTE — DISCHARGE NOTE ADULT - CARE PROVIDERS DIRECT ADDRESSES
[email or web address]_Unknown

## 2018-05-14 NOTE — PROGRESS NOTE ADULT - PROBLEM SELECTOR PROBLEM 3
Dementia with behavioral disturbance, unspecified dementia type Closed fracture of right hip, initial encounter

## 2018-05-14 NOTE — DISCHARGE NOTE ADULT - CARE PROVIDER_API CALL
Fer Gonzalez), Orthopaedic Surgery  [address] [place] [address]  Phone: [phone]  Fax: [phone] Fer Gonzalez), Orthopaedic Surgery  [address] [place] [address]  Phone: [phone]  Fax: [phone]    Agustín Peters (), Family Medicine  [address] [place] [address]  Phone: [phone]  Fax: [phone]

## 2018-05-14 NOTE — SWALLOW BEDSIDE ASSESSMENT ADULT - PHARYNGEAL PHASE
Wet vocal quality post oral intake/Cough post oral intake/Delayed pharyngeal swallow Delayed pharyngeal swallow/Wet vocal quality post oral intake/Cough post oral intake Cough post oral intake/Delayed pharyngeal swallow/Wet vocal quality post oral intake

## 2018-05-14 NOTE — DISCHARGE NOTE ADULT - HOSPITAL COURSE
89 yo F with h/o dementia, CAD presents from assisted living facility s/p fall. In the ED, found to have right comminuted femoral neck fracture. On 5/11, had partial hemiarthroplasty. On 5/14, Hgb dropped to 7.1. Received 1u PRBC. Speech therapy found pt with pharyngeal dysphagia. As per both HCP, Ted and Jordi Schultz, they are concerned for her comfort and refuse feeding tubes at this time. They understand risks of aspiration, pneumonia, and death. She is to have comfort feeds. DNR/DNI     patient started to develop joy with peak creatintine approx 2.8. Patients meds held and started on fluids. Patient mentally improved and is now back at baseline. Patients creatinine improced and can be discharged either inpatient hospice or home with hospice.     time spent on dc 32 mintues 89 yo F with h/o dementia, CAD presents from assisted living facility s/p fall. In the ED, found to have right comminuted femoral neck fracture. On 5/11, had partial hemiarthroplasty. On 5/14, Hgb dropped to 7.1. Received 1u PRBC. Speech therapy found pt with pharyngeal dysphagia. As per both HCP, Ted and Jordi Schultz, they are concerned for her comfort and refuse feeding tubes at this time. They understand risks of aspiration, pneumonia, and death. She is to have comfort feeds. DNR/DNI     patient started to develop joy with peak creatintine approx 2.8. Patients meds held and started on fluids. Patient mentally improved and is now back at baseline. Patients creatinine improced and can be discharged to [facility],     time spent on dc 32 mintues

## 2018-05-14 NOTE — PROGRESS NOTE ADULT - PROBLEM SELECTOR PLAN 2
s/p partial hemiarthroplasty  PT eval noted - rehab  c/w pain control Speech leeroy noted - unable to tolerate any consistency  Had extensive discussions with both Ted and Jordi Schultz regarding patient's dysphagia and her quality of life. They both state that patient would not want a feeding tube at this time and are most concerned about her comfort. I explained that she will be at risk of aspirating, developing overwhelming infection, and may die. They state that they understand the risk, but given her age, fraily, dementia, they will pursue comfort feeds at this time.

## 2018-05-14 NOTE — PROGRESS NOTE BEHAVIORAL HEALTH - NSBHFUPINTERVALHXFT_PSY_A_CORE
Patient is a 89 y/o  female, domiciled in an assisted living, with PMH of  CAD s/p remote stents, left hip fracture with remote repair presents from Assisted Living facility for fall with resulting right hip pain, found to have right hip fracture and is now s/p  partial hemiarthroplasty. Swallow eval ordered for pharyngeal dysphagia. NGT was recommended 2/2 aspiration concerns however family is concerned for her comfort and is not in agreement at this time. Patient continues to wear mitten restraint to avoid pulling out IV. Patient is confused and does not recognize son. She last received IM Haldol on 5/12. Patient is currently sleeping after receiving Dilaudid for pain.

## 2018-05-14 NOTE — DISCHARGE NOTE ADULT - CARE PLAN
Principal Discharge DX:	Closed fracture of right hip, initial encounter  Goal:	Improved function and pain control  Assessment and plan of treatment:	The patient will be seen in the office on 6/1/2018 for wound check. Sutures/Staples/Tape will be removed at that time. Patient may shower after post-op day #3. The dressing is to be removed on day # 5/201/2018. The patient will contact the office if the wound becomes red, has increasing pain, develops bleeding or discharge, an injury occurs, or has other concerns. The patient will continue PT consistent with posterior total hip replacement protocol. The patient will continue to practice posterior total hip precautions for a minimum of 6 week. The patient will continue LOVENOX for 4 weeks for clot prevention. The patient will take oxycodone or Tylenol for pain control and titrate according to prescription and patient needs. The patient is FULL weight bearing.      ///////////////////////////////POSTERIOR TOTAL HIP PRECAUTIONS///////////////////////////////////////////    *Remember to continue all of the precautions for total hip replacement. Your surgeon will tell you when and if you can move beyond these limitations.  • Do not bend your hip more than 90 degrees   • Do not cross your legs or ankles when laying sitting or standing.  • Do not raise your operated leg up with your knee straight.  • DO NOT bend over at your waist.  • Avoid sitting in low, soft chairs such as sofas and car seats. You should sit on a chair using firm pillows to raise the height  of the seat.  • Make sure your bed level is high, so that you maintain proper leg positioning when sitting on the side, or getting in or out.  procedures.  • Avoid sitting in low, soft chairs, such as sofas, easy chairs etc.   • When entering and traveling by car:      -Sit in the front passenger seat. Make sure that the car seat is all the way back and semi-reclined before entering.  • Do not allow your knees to come together when sitting or lying in bed.  • Do not take a tub bath yet.   • Do not resume driving until you have your surgeon’s permission. Principal Discharge DX:	Closed fracture of right hip, initial encounter  Goal:	Improved function and pain control  Assessment and plan of treatment:	The patient will be seen in the office on 6/1/2018 for wound check. Sutures/Staples/Tape will be removed at that time. Patient may shower after post-op day #3. The dressing is to be removed on day # 5/201/2018. The patient will contact the office if the wound becomes red, has increasing pain, develops bleeding or discharge, an injury occurs, or has other concerns. The patient will continue PT consistent with posterior total hip replacement protocol. The patient will continue to practice posterior total hip precautions for a minimum of 6 week. The patient will continue LOVENOX for 4 weeks for clot prevention. The patient will take oxycodone or Tylenol for pain control and titrate according to prescription and patient needs. The patient is FULL weight bearing.      ///////////////////////////////POSTERIOR TOTAL HIP PRECAUTIONS///////////////////////////////////////////    *Remember to continue all of the precautions for total hip replacement. Your surgeon will tell you when and if you can move beyond these limitations.  • Do not bend your hip more than 90 degrees   • Do not cross your legs or ankles when laying sitting or standing.  • Do not raise your operated leg up with your knee straight.  • DO NOT bend over at your waist.  • Avoid sitting in low, soft chairs such as sofas and car seats. You should sit on a chair using firm pillows to raise the height  of the seat.  • Make sure your bed level is high, so that you maintain proper leg positioning when sitting on the side, or getting in or out.  procedures.  • Avoid sitting in low, soft chairs, such as sofas, easy chairs etc.   • When entering and traveling by car:      -Sit in the front passenger seat. Make sure that the car seat is all the way back and semi-reclined before entering.  • Do not allow your knees to come together when sitting or lying in bed.  • Do not take a tub bath yet.   • Do not resume driving until you have your surgeon’s permission.  Secondary Diagnosis:	Aspiration pneumonia of right lower lobe, unspecified aspiration pneumonia type  Goal:	comeplted unasyn  Secondary Diagnosis:	Coronary artery disease involving native coronary artery of native heart without angina pectoris  Goal:	home meds  Secondary Diagnosis:	Delirium superimposed on dementia  Goal:	back at [facility]  Secondary Diagnosis:	Dementia  Goal:	supportice care  Secondary Diagnosis:	Hospice care Principal Discharge DX:	Closed fracture of right hip, initial encounter  Goal:	Improved function and pain control  Assessment and plan of treatment:	The patient will be seen in the office on 6/1/2018 for wound check. Sutures/Staples/Tape will be removed at that time. Patient may shower after post-op day #3. The dressing is to be removed on day # 5/201/2018. The patient will contact the office if the wound becomes red, has increasing pain, develops bleeding or discharge, an injury occurs, or has other concerns. The patient will continue PT consistent with posterior total hip replacement protocol. The patient will continue to practice posterior total hip precautions for a minimum of 6 week. The patient will continue LOVENOX for 4 weeks for clot prevention. The patient will take oxycodone or Tylenol for pain control and titrate according to prescription and patient needs. The patient is FULL weight bearing.      ///////////////////////////////POSTERIOR TOTAL HIP PRECAUTIONS///////////////////////////////////////////    *Remember to continue all of the precautions for total hip replacement. Your surgeon will tell you when and if you can move beyond these limitations.  • Do not bend your hip more than 90 degrees   • Do not cross your legs or ankles when laying sitting or standing.  • Do not raise your operated leg up with your knee straight.  • DO NOT bend over at your waist.  • Avoid sitting in low, soft chairs such as sofas and car seats. You should sit on a chair using firm pillows to raise the height  of the seat.  • Make sure your bed level is high, so that you maintain proper leg positioning when sitting on the side, or getting in or out.  procedures.  • Avoid sitting in low, soft chairs, such as sofas, easy chairs etc.   • When entering and traveling by car:      -Sit in the front passenger seat. Make sure that the car seat is all the way back and semi-reclined before entering.  • Do not allow your knees to come together when sitting or lying in bed.  • Do not take a tub bath yet.   • Do not resume driving until you have your surgeon’s permission.  Secondary Diagnosis:	Aspiration pneumonia of right lower lobe, unspecified aspiration pneumonia type  Goal:	comeplted unasyn  Secondary Diagnosis:	Coronary artery disease involving native coronary artery of native heart without angina pectoris  Goal:	home meds  Secondary Diagnosis:	Delirium superimposed on dementia  Goal:	back at [facility]  Secondary Diagnosis:	Dementia  Goal:	supportive care  Secondary Diagnosis:	Hospice care  Secondary Diagnosis:	Anemia due to blood loss  Goal:	s/p 1 unit prbc  Assessment and plan of treatment:	iron pills

## 2018-05-14 NOTE — PROGRESS NOTE BEHAVIORAL HEALTH - SUMMARY
Patient is a 89 y/o  female, domiciled in an assisted living, with PMH of  CAD s/p remote stents, left hip fracture with remote repair presents from Assisted Living facility for fall with resulting right hip pain, found to have right hip fracture and is pending surgical repair. Psychiatry consulted for dementia. Patient is a poor historian, collateral received from son Ted Schultz. Patient currently has delirium superimposed on dementia. Contributing factors for delirium include pain, pain medication and change of environment.   Recommend  Continue Zoloft 100mg for now  Would discontinue Buspar   Continue Namenda 5 mg daily for now.   Continue behavioral modifications   psychiatry will continue to follow for coordination of care.

## 2018-05-14 NOTE — DISCHARGE NOTE ADULT - MEDICATION SUMMARY - MEDICATIONS TO STOP TAKING
I will STOP taking the medications listed below when I get home from the hospital:    lisinopril 5 mg oral tablet  -- 1 tab(s) by mouth once a day  -- Do not take this drug if you are pregnant.  It is very important that you take or use this exactly as directed.  Do not skip doses or discontinue unless directed by your doctor.  Some non-prescription drugs may aggravate your condition.  Read all labels carefully.  If a warning appears, check with your doctor before taking.    Vantin  -- 100 milligram(s) by mouth 2 times a day Until 8/22/17    Florastor 250 mg oral capsule  -- 1 cap(s) by mouth 2 times a day    potassium chloride 40 mEq/15 mL oral liquid  -- 15 milliliter(s) by mouth 2 times a day Stop 8/21/17    HumaLOG 100 units/mL subcutaneous solution  -- 1 dose(s) subcutaneous 4 times a day (after meals and at bedtime)  Check FS qac and qhs.  For FS:  201-250 give 2 units subcut  251-300 give 4 units subcut  301-350 give 8 units subcut  351-400 give 10 units subcut  > 400 give 12 units subcut and call MD

## 2018-05-14 NOTE — PROGRESS NOTE ADULT - PROBLEM SELECTOR PROBLEM 6
Glaucoma Coronary artery disease involving native coronary artery of native heart without angina pectoris

## 2018-05-14 NOTE — PROGRESS NOTE ADULT - SUBJECTIVE AND OBJECTIVE BOX
KYLE DIMASFAVIOLA    [id number]    History: Patient is status post right hip hemiarthroplasty on 5/11/2018, POD #3. Patient is confused and unable to answer questions. The patient's pain is controlled using the prescribed pain medications.         MEDICATIONS  (STANDING):  amLODIPine   Tablet 10 milliGRAM(s) Oral daily  busPIRone 10 milliGRAM(s) Oral two times a day  enoxaparin Injectable 40 milliGRAM(s) SubCutaneous daily  latanoprost 0.005% Ophthalmic Solution 1 Drop(s) Both EYES at bedtime  lisinopril 5 milliGRAM(s) Oral daily  memantine 5 milliGRAM(s) Oral daily  saccharomyces boulardii 250 milliGRAM(s) Oral two times a day  senna 2 Tablet(s) Oral at bedtime  sertraline 100 milliGRAM(s) Oral daily  simvastatin 40 milliGRAM(s) Oral at bedtime    MEDICATIONS  (PRN):  bisacodyl Suppository 10 milliGRAM(s) Rectal daily PRN Constipation  fentaNYL    Injectable 25 MICROGram(s) IV Push every 10 minutes PRN Severe Pain (7 - 10)  haloperidol    Injectable 1 milliGRAM(s) IntraMuscular four times a day PRN agitation  hydrALAZINE Injectable 5 milliGRAM(s) IV Push every 4 hours PRN sbp>170  HYDROmorphone  Injectable 0.5 milliGRAM(s) IV Push every 3 hours PRN severe/breakthrough pain  traMADol 25 milliGRAM(s) Oral four times a day PRN mod pain      Physical exam: The right hip dressing is clean, dry and intact. + abduction hip pillow in position. No heel ulcerations. No drainage or discharge. No erythema is noted. No blistering. No ecchymosis. The calf is supple nontender. Passive range of motion is acceptable to due postoperative pain. No calf tenderness. Sensation to light touch is grossly intact distally. Motor function distally is 5/5. No foot drop. 2+ dorsalis pedis pulse. Capillary refill is less than 2 seconds. No cyanosis.    Primary Orthopedic Assessment:  • s/p RIGHT hip hemiarthroplasty    Secondary  Orthopedic Assessment(s):   •     Secondary  Medical Assessment(s):   Delirium superimposed on dementia  H/O scabies: H/O scabies  Goals of care, counseling/discussion: Goals of care, counseling/discussion  Essential hypertension: Essential hypertension  Falls frequently: Falls frequently  Glaucoma: Glaucoma  Dementia with behavioral disturbance, unspecified dementia type: Dementia with behavioral disturbance, unspecified dementia type  Coronary artery disease involving native coronary artery of native heart without angina pectoris: Coronary artery disease involving native coronary artery of native heart without angina pectoris  Closed fracture of right hip, initial encounter: Closed fracture of right hip, initial encounter          Plan:   • DVT prophylaxis as prescribed, including use of compression devices and ankle pumps  • Continue physical therapy  • Weightbearing as tolerated of the right lower extremity with assistance of a walker  • Incentive spirometry encouraged  • Pain control as clinically indicated  • continue posterior hip precautions  • Discharge planning – anticipated discharge is subacute rehabilitation when medically stable

## 2018-05-14 NOTE — SWALLOW BEDSIDE ASSESSMENT ADULT - ORAL PHASE
Delayed oral transit time/reduced attention throughout oral stage with continued verbalizations,/Decreased anterior-posterior movement of the bolus Decreased anterior-posterior movement of the bolus/reduced attention throughout oral stage with continued verbalizations, suspect premature loss of bolus reduced attention throughout oral stage with continued verbalizations, suspect premature loss of bolus

## 2018-05-14 NOTE — DISCHARGE NOTE ADULT - SECONDARY DIAGNOSIS.
Aspiration pneumonia of right lower lobe, unspecified aspiration pneumonia type Coronary artery disease involving native coronary artery of native heart without angina pectoris Delirium superimposed on dementia Dementia Hospice care Anemia due to blood loss

## 2018-05-14 NOTE — PROGRESS NOTE BEHAVIORAL HEALTH - NSBHCHARTREVIEWLAB_PSY_A_CORE FT
7.1    11.4  )-----------( 219      ( 14 May 2018 06:53 )             22.0     05-14    147<H>  |  112<H>  |  42.0<H>  ----------------------------<  159<H>  3.2<L>   |  17.0<L>  |  1.24    Ca    9.4      14 May 2018 06:53  Mg     2.0     05-14

## 2018-05-14 NOTE — DISCHARGE NOTE ADULT - ADDITIONAL INSTRUCTIONS
For Constipation :   • Increase your water intake. Drink at least 8 glasses of water daily.  • Try adding fiber to your diet by eating fruits, vegetables and foods that are rich in grains.  • If you do experience constipation, you may take an over-the-counter stool softener/laxative such as Lauren Colace, Senekot or Milk of Magnesia.

## 2018-05-14 NOTE — DISCHARGE NOTE ADULT - PATIENT PORTAL LINK FT
You can access the [facility] Patient Portal, offered by [facility], by registering with the following website: [email or web address]

## 2018-05-14 NOTE — SWALLOW BEDSIDE ASSESSMENT ADULT - SLP GENERAL OBSERVATIONS
Pt received in bed, awake but eyes closed, significantly reduced cognition, Ox0, unable to respond to questions or follow commands, consistent repeated verbalization "help me up, "+b/l hand mitts

## 2018-05-15 DIAGNOSIS — J69.0 PNEUMONITIS DUE TO INHALATION OF FOOD AND VOMIT: ICD-10-CM

## 2018-05-15 LAB
ANION GAP SERPL CALC-SCNC: 18 MMOL/L — HIGH (ref 5–17)
BUN SERPL-MCNC: 40 MG/DL — HIGH (ref 8–20)
CALCIUM SERPL-MCNC: 9.6 MG/DL — SIGNIFICANT CHANGE UP (ref 8.6–10.2)
CHLORIDE SERPL-SCNC: 113 MMOL/L — HIGH (ref 98–107)
CO2 SERPL-SCNC: 17 MMOL/L — LOW (ref 22–29)
CREAT SERPL-MCNC: 1.14 MG/DL — SIGNIFICANT CHANGE UP (ref 0.5–1.3)
GLUCOSE SERPL-MCNC: 152 MG/DL — HIGH (ref 70–115)
HCT VFR BLD CALC: 30.7 % — LOW (ref 37–47)
HGB BLD-MCNC: 9.7 G/DL — LOW (ref 12–16)
MAGNESIUM SERPL-MCNC: 1.8 MG/DL — SIGNIFICANT CHANGE UP (ref 1.6–2.6)
MCHC RBC-ENTMCNC: 27.3 PG — SIGNIFICANT CHANGE UP (ref 27–31)
MCHC RBC-ENTMCNC: 31.6 G/DL — LOW (ref 32–36)
MCV RBC AUTO: 86.5 FL — SIGNIFICANT CHANGE UP (ref 81–99)
PLATELET # BLD AUTO: 295 K/UL — SIGNIFICANT CHANGE UP (ref 150–400)
POTASSIUM SERPL-MCNC: 3.9 MMOL/L — SIGNIFICANT CHANGE UP (ref 3.5–5.3)
POTASSIUM SERPL-SCNC: 3.9 MMOL/L — SIGNIFICANT CHANGE UP (ref 3.5–5.3)
RBC # BLD: 3.55 M/UL — LOW (ref 4.4–5.2)
RBC # FLD: 14.3 % — SIGNIFICANT CHANGE UP (ref 11–15.6)
SODIUM SERPL-SCNC: 148 MMOL/L — HIGH (ref 135–145)
WBC # BLD: 14.1 K/UL — HIGH (ref 4.8–10.8)
WBC # FLD AUTO: 14.1 K/UL — HIGH (ref 4.8–10.8)

## 2018-05-15 PROCEDURE — 99222 1ST HOSP IP/OBS MODERATE 55: CPT

## 2018-05-15 PROCEDURE — 71045 X-RAY EXAM CHEST 1 VIEW: CPT | Mod: 26

## 2018-05-15 PROCEDURE — 99233 SBSQ HOSP IP/OBS HIGH 50: CPT

## 2018-05-15 RX ORDER — RISPERIDONE 4 MG/1
0.5 TABLET ORAL
Qty: 0 | Refills: 0 | Status: DISCONTINUED | OUTPATIENT
Start: 2018-05-15 | End: 2018-05-17

## 2018-05-15 RX ORDER — AMPICILLIN SODIUM AND SULBACTAM SODIUM 250; 125 MG/ML; MG/ML
INJECTION, POWDER, FOR SUSPENSION INTRAMUSCULAR; INTRAVENOUS
Qty: 0 | Refills: 0 | Status: DISCONTINUED | OUTPATIENT
Start: 2018-05-15 | End: 2018-05-21

## 2018-05-15 RX ORDER — AMPICILLIN SODIUM AND SULBACTAM SODIUM 250; 125 MG/ML; MG/ML
1.5 INJECTION, POWDER, FOR SUSPENSION INTRAMUSCULAR; INTRAVENOUS EVERY 6 HOURS
Qty: 0 | Refills: 0 | Status: DISCONTINUED | OUTPATIENT
Start: 2018-05-15 | End: 2018-05-21

## 2018-05-15 RX ORDER — LIDOCAINE 4 G/100G
1 CREAM TOPICAL DAILY
Qty: 0 | Refills: 0 | Status: DISCONTINUED | OUTPATIENT
Start: 2018-05-15 | End: 2018-05-22

## 2018-05-15 RX ORDER — FENTANYL CITRATE 50 UG/ML
1 INJECTION INTRAVENOUS
Qty: 0 | Refills: 0 | Status: DISCONTINUED | OUTPATIENT
Start: 2018-05-15 | End: 2018-05-21

## 2018-05-15 RX ORDER — HYDROMORPHONE HYDROCHLORIDE 2 MG/ML
2 INJECTION INTRAMUSCULAR; INTRAVENOUS; SUBCUTANEOUS EVERY 6 HOURS
Qty: 0 | Refills: 0 | Status: DISCONTINUED | OUTPATIENT
Start: 2018-05-15 | End: 2018-05-16

## 2018-05-15 RX ORDER — CELECOXIB 200 MG/1
200 CAPSULE ORAL DAILY
Qty: 0 | Refills: 0 | Status: DISCONTINUED | OUTPATIENT
Start: 2018-05-15 | End: 2018-05-17

## 2018-05-15 RX ORDER — ACETAMINOPHEN 500 MG
1000 TABLET ORAL ONCE
Qty: 0 | Refills: 0 | Status: COMPLETED | OUTPATIENT
Start: 2018-05-15 | End: 2018-05-15

## 2018-05-15 RX ORDER — AMPICILLIN SODIUM AND SULBACTAM SODIUM 250; 125 MG/ML; MG/ML
1.5 INJECTION, POWDER, FOR SUSPENSION INTRAMUSCULAR; INTRAVENOUS ONCE
Qty: 0 | Refills: 0 | Status: COMPLETED | OUTPATIENT
Start: 2018-05-15 | End: 2018-05-15

## 2018-05-15 RX ADMIN — SERTRALINE 100 MILLIGRAM(S): 25 TABLET, FILM COATED ORAL at 11:26

## 2018-05-15 RX ADMIN — HYDROMORPHONE HYDROCHLORIDE 0.5 MILLIGRAM(S): 2 INJECTION INTRAMUSCULAR; INTRAVENOUS; SUBCUTANEOUS at 10:39

## 2018-05-15 RX ADMIN — Medication 1000 MILLIGRAM(S): at 18:39

## 2018-05-15 RX ADMIN — MEMANTINE HYDROCHLORIDE 5 MILLIGRAM(S): 10 TABLET ORAL at 11:26

## 2018-05-15 RX ADMIN — CELECOXIB 200 MILLIGRAM(S): 200 CAPSULE ORAL at 16:32

## 2018-05-15 RX ADMIN — HYDROMORPHONE HYDROCHLORIDE 0.5 MILLIGRAM(S): 2 INJECTION INTRAMUSCULAR; INTRAVENOUS; SUBCUTANEOUS at 09:47

## 2018-05-15 RX ADMIN — CELECOXIB 200 MILLIGRAM(S): 200 CAPSULE ORAL at 18:39

## 2018-05-15 RX ADMIN — SENNA PLUS 2 TABLET(S): 8.6 TABLET ORAL at 22:34

## 2018-05-15 RX ADMIN — FENTANYL CITRATE 1 PATCH: 50 INJECTION INTRAVENOUS at 14:54

## 2018-05-15 RX ADMIN — Medication 400 MILLIGRAM(S): at 16:29

## 2018-05-15 RX ADMIN — Medication 250 MILLIGRAM(S): at 05:44

## 2018-05-15 RX ADMIN — Medication 10 MILLIGRAM(S): at 17:01

## 2018-05-15 RX ADMIN — RISPERIDONE 0.5 MILLIGRAM(S): 4 TABLET ORAL at 17:02

## 2018-05-15 RX ADMIN — HALOPERIDOL DECANOATE 1 MILLIGRAM(S): 100 INJECTION INTRAMUSCULAR at 22:34

## 2018-05-15 RX ADMIN — LISINOPRIL 5 MILLIGRAM(S): 2.5 TABLET ORAL at 05:44

## 2018-05-15 RX ADMIN — AMPICILLIN SODIUM AND SULBACTAM SODIUM 100 GRAM(S): 250; 125 INJECTION, POWDER, FOR SUSPENSION INTRAMUSCULAR; INTRAVENOUS at 12:05

## 2018-05-15 RX ADMIN — Medication 10 MILLIGRAM(S): at 05:44

## 2018-05-15 RX ADMIN — HALOPERIDOL DECANOATE 1 MILLIGRAM(S): 100 INJECTION INTRAMUSCULAR at 09:22

## 2018-05-15 RX ADMIN — ENOXAPARIN SODIUM 40 MILLIGRAM(S): 100 INJECTION SUBCUTANEOUS at 11:28

## 2018-05-15 RX ADMIN — LIDOCAINE 1 PATCH: 4 CREAM TOPICAL at 16:29

## 2018-05-15 RX ADMIN — Medication 250 MILLIGRAM(S): at 17:02

## 2018-05-15 RX ADMIN — SIMVASTATIN 40 MILLIGRAM(S): 20 TABLET, FILM COATED ORAL at 22:34

## 2018-05-15 RX ADMIN — HYDROMORPHONE HYDROCHLORIDE 2 MILLIGRAM(S): 2 INJECTION INTRAMUSCULAR; INTRAVENOUS; SUBCUTANEOUS at 22:32

## 2018-05-15 RX ADMIN — TRAMADOL HYDROCHLORIDE 25 MILLIGRAM(S): 50 TABLET ORAL at 05:44

## 2018-05-15 RX ADMIN — TRAMADOL HYDROCHLORIDE 25 MILLIGRAM(S): 50 TABLET ORAL at 06:30

## 2018-05-15 RX ADMIN — AMLODIPINE BESYLATE 10 MILLIGRAM(S): 2.5 TABLET ORAL at 05:44

## 2018-05-15 RX ADMIN — LATANOPROST 1 DROP(S): 0.05 SOLUTION/ DROPS OPHTHALMIC; TOPICAL at 22:34

## 2018-05-15 RX ADMIN — AMPICILLIN SODIUM AND SULBACTAM SODIUM 100 GRAM(S): 250; 125 INJECTION, POWDER, FOR SUSPENSION INTRAMUSCULAR; INTRAVENOUS at 17:01

## 2018-05-15 NOTE — CONSULT NOTE ADULT - SUBJECTIVE AND OBJECTIVE BOX
HPI: This is a frail elderly 89yo F with PMH of mild senile dementia, HTN, CHF, CAD, Arthritis, DM; who was transferred from the [facility] facility  after falling and sustaining R Femur Fracture. She has been confused agitated and Delirious since admission to hospital. She is post-op day 4 R THR nonverbal-repetittive speech, physically defensive and anxious-not making eye contact. Received Haldol 1mg IM this AM. NO SOB  CC: Post-op right hip replacement- Assume Pain Is Present-cannot self advocate exhibiting  hyperactive Delirium.      89 yo F w/ hx dementia, CAD s/p remote stents, left hip fracture with remote repair presents from Assisted Living facility for fall with resulting right hip pain.  In ER, found to have hip fracture.  Patient is acute confused, yelling "help me" and wants to walk. unable to obtain information/ROS.  all history obtained from chart and facility notes.     per son, patient has had multiple falls recently and at baseline, confused but able to ambulate with walker and assistance. (10 May 2018 14:11)      PERTINENT PMH REVIEWED: Yes     PAST MEDICAL & SURGICAL HISTORY:  Dementia  Coronary artery disease involving native coronary artery of native heart without angina pectoris  High cholesterol  HTN (hypertension)  DM (diabetes mellitus)  S/P cataract extraction and insertion of intraocular lens, right  S/P cataract extraction and insertion of intraocular lens, left      SOCIAL HISTORY:  EtOH    No                                    Drugs  No                                    nonsmoker                                    Admitted from:   [facility] _______Son Ted [phone]_    FAMILY HISTORY:  No pertinent family history in first degree relatives      Allergies  No Known Allergies    Baseline ADLs (prior to admission):  Independent/    Present Symptoms:     Dyspnea: 0    Nausea/Vomiting: No  Agitated  Yes  Depression:  No   Fatigue: Yes   Loss of appetite: Yes     Pain:  chronic OA  Acute R Hip Pain            Character-            Duration-            Effect-            Factors-            Frequency-            Location-R Hip Replaced PO DAY 4            Severity-moderate to severe    Review of Systems: Reviewed                      Unable to obtain due to poor mentation       MEDICATIONS  (STANDING):  acetaminophen  IVPB. 1000 milliGRAM(s) IV Intermittent once  amLODIPine   Tablet 10 milliGRAM(s) Oral daily  ampicillin/sulbactam  IVPB      ampicillin/sulbactam  IVPB 1.5 Gram(s) IV Intermittent every 6 hours  busPIRone 10 milliGRAM(s) Oral two times a day  celecoxib 200 milliGRAM(s) Oral daily  enoxaparin Injectable 40 milliGRAM(s) SubCutaneous daily  fentaNYL   Patch  12 MICROgram(s)/Hr 1 Patch Transdermal every 72 hours  HYDROmorphone   Tablet 2 milliGRAM(s) Oral every 6 hours  latanoprost 0.005% Ophthalmic Solution 1 Drop(s) Both EYES at bedtime  lidocaine   Patch 1 Patch Transdermal daily  lisinopril 5 milliGRAM(s) Oral daily  memantine 5 milliGRAM(s) Oral daily  risperiDONE  Disintegrating Tablet 0.5 milliGRAM(s) Oral two times a day  saccharomyces boulardii 250 milliGRAM(s) Oral two times a day  senna 2 Tablet(s) Oral at bedtime  sertraline 100 milliGRAM(s) Oral daily  simvastatin 40 milliGRAM(s) Oral at bedtime    MEDICATIONS  (PRN):  bisacodyl Suppository 10 milliGRAM(s) Rectal daily PRN Constipation  fentaNYL    Injectable 25 MICROGram(s) IV Push every 10 minutes PRN Severe Pain (7 - 10)  haloperidol    Injectable 1 milliGRAM(s) IntraMuscular four times a day PRN agitation  hydrALAZINE Injectable 5 milliGRAM(s) IV Push every 4 hours PRN sbp>170  HYDROmorphone  Injectable 0.5 milliGRAM(s) IV Push every 3 hours PRN severe/breakthrough pain      PHYSICAL EXAM:    Vital Signs Last 24 Hrs  T(C): 37.2 (15 May 2018 10:57), Max: 37.2 (15 May 2018 10:57)  T(F): 99 (15 May 2018 10:57), Max: 99 (15 May 2018 10:57)  HR: 109 (15 May 2018 10:57) (100 - 113)  BP: 157/67 (15 May 2018 10:57) (123/63 - 166/72)  BP(mean): --  RR: 18 (15 May 2018 10:57) (18 - 20)  SpO2: 100% (15 May 2018 04:00) (96% - 100%)    General:  agitated                  thin  nonverbal      HEENT: n dry mouth     Lungs: comfortable     CV:  tachycardia    GI: n  distended                  constipation  last BM: ?    : incontinent castano    MSK:  weakness                bedbound/    Skin: normal___  no rash    LABS:                        9.7    14.1  )-----------( 295      ( 15 May 2018 07:02 )             30.7     05-15    148<H>  |  113<H>  |  40.0<H>  ----------------------------<  152<H>  3.9   |  17.0<L>  |  1.14    Ca    9.6      15 May 2018 07:02  Mg     1.8     05-15    I&O's Summary    14 May 2018 07:01  -  15 May 2018 07:00  --------------------------------------------------------  IN: 400 mL / OUT: 0 mL / NET: 400 mL    15 May 2018 07:01  -  15 May 2018 15:00  --------------------------------------------------------  IN: 50 mL / OUT: 0 mL / NET: 50 mL    RADIOLOGY & ADDITIONAL STUDIES:    ADVANCE DIRECTIVES:   DNR YES   Completed on:                     MOLST  NO    Completed on: As per son was completed in past. Reconfirms today DNR/DNI No feeding tube Comfort care  Living Will   NO   Completed on:

## 2018-05-15 NOTE — CONSULT NOTE ADULT - ATTENDING COMMENTS
COUNSELING:    Face to face meeting to discuss Advanced Care Planning - Time Spent __30____ Minutes.  See goals of care note.    More than 50% time spent in counseling and coordinating care. __25____ Minutes.     Thank you for the opportunity to assist with the care of this patient.   [facility] Consult Service [phone].

## 2018-05-15 NOTE — PROGRESS NOTE ADULT - PROBLEM SELECTOR PLAN 3
Speech leeroy noted - unable to tolerate any consistency  Had extensive discussions with both Ted and Jordi Schultz regarding patient's dysphagia and her quality of life. They both state that patient would not want a feeding tube at this time and are most concerned about her comfort. I explained that she will be at risk of aspirating, developing overwhelming infection, and may die. They state that they understand the risk, but given her age, fraily, dementia, they will pursue comfort feeds at this time.

## 2018-05-15 NOTE — PROGRESS NOTE ADULT - SUBJECTIVE AND OBJECTIVE BOX
KYLE SCHULTZ    [id number]    89y      Female    INTERVAL HPI/OVERNIGHT EVENTS: Resting comfortably.    Hospital course:  89 yo F with h/o dementia, CAD presents from assisted living facility s/p fall. In the ED, found to have right comminuted femoral neck fracture. On 5/11, had partial hemiarthroplasty. On 5/14, Hgb dropped to 7.1. Received 1u PRBC. Speech therapy found pt with pharyngeal dysphagia. As per both HCP, Ted and Jordi Schultz, they are concerned for her comfort and refuse feeding tubes at this time. They understand risks of aspiration, pneumonia, and death. She is to have comfort feeds. DNR/DNI form completed. PT recommending rehab. Palliative consult pending.       REVIEW OF SYSTEMS:  Unable to perform as pt uncooperative.        Vital Signs Last 24 Hrs  T(C): 37.2 (15 May 2018 10:57), Max: 37.2 (15 May 2018 10:57)  T(F): 99 (15 May 2018 10:57), Max: 99 (15 May 2018 10:57)  HR: 109 (15 May 2018 10:57) (100 - 113)  BP: 157/67 (15 May 2018 10:57) (123/63 - 166/72)  BP(mean): --  RR: 18 (15 May 2018 10:57) (18 - 20)  SpO2: 100% (15 May 2018 04:00) (96% - 100%)    PHYSICAL EXAM:    GENERAL: NAD   HEENT: PERRL, +EOMI   CHEST/LUNG: Clear to percussion bilaterally; No wheezing  HEART: S1S2+   ABDOMEN: Soft, Nontender, Nondistended; Bowel sounds present  EXTREMITIES: [facility]      LABS:                        9.7    14.1  )-----------( 295      ( 15 May 2018 07:02 )             30.7     05-15    148<H>  |  113<H>  |  40.0<H>  ----------------------------<  152<H>  3.9   |  17.0<L>  |  1.14    Ca    9.6      15 May 2018 07:02  Mg     1.8     05-15              MEDICATIONS  (STANDING):  amLODIPine   Tablet 10 milliGRAM(s) Oral daily  busPIRone 10 milliGRAM(s) Oral two times a day  enoxaparin Injectable 40 milliGRAM(s) SubCutaneous daily  latanoprost 0.005% Ophthalmic Solution 1 Drop(s) Both EYES at bedtime  levoFLOXacin IVPB      levoFLOXacin IVPB 500 milliGRAM(s) IV Intermittent once  lisinopril 5 milliGRAM(s) Oral daily  memantine 5 milliGRAM(s) Oral daily  saccharomyces boulardii 250 milliGRAM(s) Oral two times a day  senna 2 Tablet(s) Oral at bedtime  sertraline 100 milliGRAM(s) Oral daily  simvastatin 40 milliGRAM(s) Oral at bedtime    MEDICATIONS  (PRN):  bisacodyl Suppository 10 milliGRAM(s) Rectal daily PRN Constipation  fentaNYL    Injectable 25 MICROGram(s) IV Push every 10 minutes PRN Severe Pain (7 - 10)  haloperidol    Injectable 1 milliGRAM(s) IntraMuscular four times a day PRN agitation  hydrALAZINE Injectable 5 milliGRAM(s) IV Push every 4 hours PRN sbp>170  HYDROmorphone  Injectable 0.5 milliGRAM(s) IV Push every 3 hours PRN severe/breakthrough pain  traMADol 25 milliGRAM(s) Oral four times a day PRN mod pain      RADIOLOGY & ADDITIONAL TESTS:

## 2018-05-15 NOTE — CONSULT NOTE ADULT - ASSESSMENT
89 YO S/P Fall  R Femur Fracture POD#4 R Total Hip Arthroplasty  Assume Pain is Present  Initiate Fentanyl 12 mcg/hr patch for long acting pain relief  DC Tramadol  Start Dilaudid 2mg Q6x3 then continue prn  Ofirmev infusion ASAP  Celebrex-antiinflammatory  Lidoderm patch to R hip    Delirium with behavioral response  Underlying mild senile dementia  On Buspar  Zoloft  Add Risperdal 0.5mg PO twice day  Haldol for extreme agitation    Aspiration PNA  RLL  Unasyn IV    Pharyngeal Dysphagia  family refusing feeding tube placement  Comfort feeds    Acute Blood Loss Anemia-transfused one unit  H/H stable at this time    PLAN: Family wants comfort and Mom not to suffer  Treat pain aggressively,   Use Risperdal for delirium confusion  Hospice Referral    Goals of Care discussion with son Ted- reconfirmed directives and MOLST to be completed via verbal consent for now.  Hospice called  Comfort, and treat delirium to hopefully return to pre-injury state of mind

## 2018-05-16 LAB
ANION GAP SERPL CALC-SCNC: 15 MMOL/L — SIGNIFICANT CHANGE UP (ref 5–17)
BUN SERPL-MCNC: 43 MG/DL — HIGH (ref 8–20)
CALCIUM SERPL-MCNC: 9.2 MG/DL — SIGNIFICANT CHANGE UP (ref 8.6–10.2)
CHLORIDE SERPL-SCNC: 111 MMOL/L — HIGH (ref 98–107)
CO2 SERPL-SCNC: 21 MMOL/L — LOW (ref 22–29)
CREAT SERPL-MCNC: 1.26 MG/DL — SIGNIFICANT CHANGE UP (ref 0.5–1.3)
GLUCOSE SERPL-MCNC: 126 MG/DL — HIGH (ref 70–115)
HCT VFR BLD CALC: 31.3 % — LOW (ref 37–47)
HGB BLD-MCNC: 10 G/DL — LOW (ref 12–16)
MAGNESIUM SERPL-MCNC: 1.7 MG/DL — SIGNIFICANT CHANGE UP (ref 1.6–2.6)
MCHC RBC-ENTMCNC: 27.8 PG — SIGNIFICANT CHANGE UP (ref 27–31)
MCHC RBC-ENTMCNC: 31.9 G/DL — LOW (ref 32–36)
MCV RBC AUTO: 86.9 FL — SIGNIFICANT CHANGE UP (ref 81–99)
PLATELET # BLD AUTO: 284 K/UL — SIGNIFICANT CHANGE UP (ref 150–400)
POTASSIUM SERPL-MCNC: 3.4 MMOL/L — LOW (ref 3.5–5.3)
POTASSIUM SERPL-SCNC: 3.4 MMOL/L — LOW (ref 3.5–5.3)
RBC # BLD: 3.6 M/UL — LOW (ref 4.4–5.2)
RBC # FLD: 14.6 % — SIGNIFICANT CHANGE UP (ref 11–15.6)
SODIUM SERPL-SCNC: 147 MMOL/L — HIGH (ref 135–145)
WBC # BLD: 11 K/UL — HIGH (ref 4.8–10.8)
WBC # FLD AUTO: 11 K/UL — HIGH (ref 4.8–10.8)

## 2018-05-16 PROCEDURE — 99233 SBSQ HOSP IP/OBS HIGH 50: CPT

## 2018-05-16 RX ORDER — HYDROMORPHONE HYDROCHLORIDE 2 MG/ML
0.5 INJECTION INTRAMUSCULAR; INTRAVENOUS; SUBCUTANEOUS
Qty: 0 | Refills: 0 | Status: DISCONTINUED | OUTPATIENT
Start: 2018-05-16 | End: 2018-05-22

## 2018-05-16 RX ADMIN — LIDOCAINE 1 PATCH: 4 CREAM TOPICAL at 23:07

## 2018-05-16 RX ADMIN — ENOXAPARIN SODIUM 40 MILLIGRAM(S): 100 INJECTION SUBCUTANEOUS at 11:30

## 2018-05-16 RX ADMIN — LISINOPRIL 5 MILLIGRAM(S): 2.5 TABLET ORAL at 06:31

## 2018-05-16 RX ADMIN — SERTRALINE 100 MILLIGRAM(S): 25 TABLET, FILM COATED ORAL at 11:29

## 2018-05-16 RX ADMIN — CELECOXIB 200 MILLIGRAM(S): 200 CAPSULE ORAL at 11:29

## 2018-05-16 RX ADMIN — HYDROMORPHONE HYDROCHLORIDE 2 MILLIGRAM(S): 2 INJECTION INTRAMUSCULAR; INTRAVENOUS; SUBCUTANEOUS at 02:31

## 2018-05-16 RX ADMIN — Medication 10 MILLIGRAM(S): at 17:20

## 2018-05-16 RX ADMIN — AMPICILLIN SODIUM AND SULBACTAM SODIUM 100 GRAM(S): 250; 125 INJECTION, POWDER, FOR SUSPENSION INTRAMUSCULAR; INTRAVENOUS at 06:29

## 2018-05-16 RX ADMIN — Medication 10 MILLIGRAM(S): at 06:30

## 2018-05-16 RX ADMIN — RISPERIDONE 0.5 MILLIGRAM(S): 4 TABLET ORAL at 06:30

## 2018-05-16 RX ADMIN — MEMANTINE HYDROCHLORIDE 5 MILLIGRAM(S): 10 TABLET ORAL at 11:30

## 2018-05-16 RX ADMIN — CELECOXIB 200 MILLIGRAM(S): 200 CAPSULE ORAL at 11:59

## 2018-05-16 RX ADMIN — AMLODIPINE BESYLATE 10 MILLIGRAM(S): 2.5 TABLET ORAL at 06:30

## 2018-05-16 RX ADMIN — AMPICILLIN SODIUM AND SULBACTAM SODIUM 100 GRAM(S): 250; 125 INJECTION, POWDER, FOR SUSPENSION INTRAMUSCULAR; INTRAVENOUS at 01:35

## 2018-05-16 RX ADMIN — HYDROMORPHONE HYDROCHLORIDE 0.5 MILLIGRAM(S): 2 INJECTION INTRAMUSCULAR; INTRAVENOUS; SUBCUTANEOUS at 14:58

## 2018-05-16 RX ADMIN — SIMVASTATIN 40 MILLIGRAM(S): 20 TABLET, FILM COATED ORAL at 21:45

## 2018-05-16 RX ADMIN — LATANOPROST 1 DROP(S): 0.05 SOLUTION/ DROPS OPHTHALMIC; TOPICAL at 21:45

## 2018-05-16 RX ADMIN — HYDROMORPHONE HYDROCHLORIDE 2 MILLIGRAM(S): 2 INJECTION INTRAMUSCULAR; INTRAVENOUS; SUBCUTANEOUS at 03:31

## 2018-05-16 RX ADMIN — HYDROMORPHONE HYDROCHLORIDE 0.5 MILLIGRAM(S): 2 INJECTION INTRAMUSCULAR; INTRAVENOUS; SUBCUTANEOUS at 15:00

## 2018-05-16 RX ADMIN — Medication 250 MILLIGRAM(S): at 17:24

## 2018-05-16 RX ADMIN — LIDOCAINE 1 PATCH: 4 CREAM TOPICAL at 04:35

## 2018-05-16 RX ADMIN — RISPERIDONE 0.5 MILLIGRAM(S): 4 TABLET ORAL at 17:20

## 2018-05-16 RX ADMIN — SENNA PLUS 2 TABLET(S): 8.6 TABLET ORAL at 21:45

## 2018-05-16 RX ADMIN — HYDROMORPHONE HYDROCHLORIDE 2 MILLIGRAM(S): 2 INJECTION INTRAMUSCULAR; INTRAVENOUS; SUBCUTANEOUS at 06:28

## 2018-05-16 RX ADMIN — AMPICILLIN SODIUM AND SULBACTAM SODIUM 100 GRAM(S): 250; 125 INJECTION, POWDER, FOR SUSPENSION INTRAMUSCULAR; INTRAVENOUS at 11:30

## 2018-05-16 RX ADMIN — HALOPERIDOL DECANOATE 1 MILLIGRAM(S): 100 INJECTION INTRAMUSCULAR at 11:30

## 2018-05-16 RX ADMIN — AMPICILLIN SODIUM AND SULBACTAM SODIUM 100 GRAM(S): 250; 125 INJECTION, POWDER, FOR SUSPENSION INTRAMUSCULAR; INTRAVENOUS at 17:20

## 2018-05-16 RX ADMIN — LIDOCAINE 1 PATCH: 4 CREAM TOPICAL at 11:29

## 2018-05-16 RX ADMIN — Medication 250 MILLIGRAM(S): at 06:30

## 2018-05-16 RX ADMIN — HYDROMORPHONE HYDROCHLORIDE 2 MILLIGRAM(S): 2 INJECTION INTRAMUSCULAR; INTRAVENOUS; SUBCUTANEOUS at 07:28

## 2018-05-16 NOTE — GOALS OF CARE CONVERSATION - PERSONAL ADVANCE DIRECTIVE - CONVERSATION DETAILS
Asked by palliative care team  to meet with son to discuss hospice services . as per son pt has no mast medical  history of any significant  medical dx. , he report pre diabetes and mild senile degeneration of the brain was forgetful at times but had been able to carry on a conversation  was ambulatory with a walker and had lived and participated with activities  at [facility] . toileted herself . he does report in the last 6months or so pt had been falling more frequently and had been hospitalized 3 or 4 times status post falls  without signifcant injury before had a hx of uti and pna  on previous hospitalizations . Son reports that pt had never sun-downed or been agitated in her home setting but frequently becomes confused and agitated  when hospitalized . As per son she had a good appetite  and had not had a signifcant weight loss or any reported difficulty swallowing before . we did discuss that given Asked by palliative care team  to meet with son to discuss hospice services . as per son pt has no past medical  history of any  medical dx. , he report pre diabetes and mild senile degeneration of the brain was forgetful at times but had been able to carry on a conversation  was ambulatory with a walker and had lived and participated with activities at assisted living . continent  he does report in the last 6 months or so pt had been falling more frequently and had been hospitalized 3 or 4 times status post falls  without significant injury before had a hx of uti and pna  on previous hospitalizations . Son reports that pt had never sun-downed or been agitated in her home setting but frequently becomes confused and agitated  when hospitalized . As per son she had a good appetite  and had not had a significant weight loss or any reported difficulty swallowing before . we did discuss that given pt age and  significant fall  with hip fracture this could be a significant health event and pt may never be able to return to baseline status . As per hospital chart  pt was admited from assisted living for hip pain found to have a hip fx and is post op  day 5 ,   she has not done well on her speech and swallow eval as per  attending and now has an aspiration pna  son is hopeful that pt may regain some of her baseline functioning  and has many questions for the doctor  called dr landry she will come speak to son . Pt today is very lethargic as per attending pt had been very agitated yesterday  as per palliative np agitation might be related to significant pain  and or delirium related to pts infection , np has aggressively started to managed pt's assumed pain ,it difficult for this rn to know if present mental status is related to medication and or disease process or both . hospice program and services explained  to son contact numbers for hospice provided . will submit clinical to hospice medical doctor for review for hospice appropriateness but plan is unclear as is setting for hospice . will wait for direction from hospice doctor and attending . Thank you for this referral .

## 2018-05-16 NOTE — DIETITIAN INITIAL EVALUATION ADULT. - PERTINENT LABORATORY DATA
05-16 Na147 mmol/L<H> Glu 126 mg/dL<H> K+ 3.4 mmol/L<L> Cr  1.26 mg/dL BUN 43.0 mg/dL<H> Phos n/a   Alb n/a   PAB n/a

## 2018-05-16 NOTE — PROGRESS NOTE ADULT - PROBLEM SELECTOR PLAN 1
s/p partial hemiarthroplasty  PT eval noted - rehab  c/w pain control
Postoperative - acute  Hgb 9.7  S/p 1u PRBC this admission  Monitor hgb and transfuse to keep Hgb >7
OR today.  EKG/trop/echo reviewed  no absolute medical contraindication for proposed procedure. inherent risks of anesthesia and procedure.
Postoperative - acute  Hgb 10 - stable  S/p 1u PRBC this admission  Monitor hgb and transfuse to keep Hgb >7
s/p partial hemiarthroplasty  PT eval noted - rehab  c/w pain control
Postoperative - acute  Hgb 7.1  Consent obtained via telephone with HCP Ted Schultz  Transfuse 1u PRBC today

## 2018-05-16 NOTE — PROGRESS NOTE ADULT - PROBLEM SELECTOR PROBLEM 1
Closed fracture of right hip, initial encounter
Anemia due to blood loss
Closed fracture of right hip, initial encounter
Closed fracture of right hip, initial encounter

## 2018-05-16 NOTE — DIETITIAN INITIAL EVALUATION ADULT. - OTHER INFO
BMI 27.4. As per H&P son reports no weight loss, no hx of swallowing difficulties and good po PTA. Lives at assisted living.

## 2018-05-16 NOTE — GOALS OF CARE CONVERSATION - PERSONAL ADVANCE DIRECTIVE - CONVERSATION DETAILS
Case  was reviewed with hospice doctor Dr Anurag Wilson he asks that I revisit this pt in 24-48hr as it is difficult to determine if pt will make any significant improvement with antibiotics and /or pain management  slighty premature for hospice

## 2018-05-16 NOTE — PROGRESS NOTE ADULT - SUBJECTIVE AND OBJECTIVE BOX
KYLE SCHULTZ    [id number]    89y      Female    INTERVAL HPI/OVERNIGHT EVENTS: Son at bedside. Pt resting more comfortably today.    Hospital course:  89 yo F with h/o dementia, CAD presents from assisted living facility s/p fall. In the ED, found to have right comminuted femoral neck fracture. On 5/11, had partial hemiarthroplasty. On 5/14, Hgb dropped to 7.1. Received 1u PRBC. Speech therapy found pt with pharyngeal dysphagia. As per both HCP, Ted and Jordi Schultz, they are concerned for her comfort and refuse feeding tubes at this time. They understand risks of aspiration, pneumonia, and death. She is to have comfort feeds. DNR/DNI form completed. PT recommending rehab. Palliative consulted.     REVIEW OF SYSTEMS:    Unable to assess due to pt's lethargy    Vital Signs Last 24 Hrs  T(C): 36.8 (16 May 2018 10:41), Max: 37.6 (15 May 2018 16:32)  T(F): 98.2 (16 May 2018 10:41), Max: 99.6 (15 May 2018 16:32)  HR: 112 (16 May 2018 10:41) (101 - 115)  BP: 141/64 (16 May 2018 10:41) (129/63 - 148/55)  BP(mean): --  RR: 18 (16 May 2018 10:41) (17 - 18)  SpO2: 97% (16 May 2018 04:20) (96% - 97%)    PHYSICAL EXAM:    GENERAL: NAD  HEENT: PERRL, +EOMI, MMM  CHEST/LUNG: Clear to percussion bilaterally; No wheezing  HEART: S1S2+, Regular rate and rhythm   ABDOMEN: Soft, Nontender, Nondistended; Bowel sounds present  EXTREMITIES:  [facility]      LABS:                        10.0   11.0  )-----------( 284      ( 16 May 2018 06:35 )             31.3     05-16    147<H>  |  111<H>  |  43.0<H>  ----------------------------<  126<H>  3.4<L>   |  21.0<L>  |  1.26    Ca    9.2      16 May 2018 06:35  Mg     1.7     05-16              MEDICATIONS  (STANDING):  amLODIPine   Tablet 10 milliGRAM(s) Oral daily  ampicillin/sulbactam  IVPB      ampicillin/sulbactam  IVPB 1.5 Gram(s) IV Intermittent every 6 hours  busPIRone 10 milliGRAM(s) Oral two times a day  celecoxib 200 milliGRAM(s) Oral daily  enoxaparin Injectable 40 milliGRAM(s) SubCutaneous daily  fentaNYL   Patch  12 MICROgram(s)/Hr 1 Patch Transdermal every 72 hours  latanoprost 0.005% Ophthalmic Solution 1 Drop(s) Both EYES at bedtime  lidocaine   Patch 1 Patch Transdermal daily  lisinopril 5 milliGRAM(s) Oral daily  memantine 5 milliGRAM(s) Oral daily  risperiDONE  Disintegrating Tablet 0.5 milliGRAM(s) Oral two times a day  saccharomyces boulardii 250 milliGRAM(s) Oral two times a day  senna 2 Tablet(s) Oral at bedtime  sertraline 100 milliGRAM(s) Oral daily  simvastatin 40 milliGRAM(s) Oral at bedtime    MEDICATIONS  (PRN):  bisacodyl Suppository 10 milliGRAM(s) Rectal daily PRN Constipation  fentaNYL    Injectable 25 MICROGram(s) IV Push every 10 minutes PRN Severe Pain (7 - 10)  haloperidol    Injectable 1 milliGRAM(s) IntraMuscular four times a day PRN agitation  hydrALAZINE Injectable 5 milliGRAM(s) IV Push every 4 hours PRN sbp>170  HYDROmorphone  Injectable 0.5 milliGRAM(s) IV Push every 3 hours PRN severe/breakthrough pain      RADIOLOGY & ADDITIONAL TESTS:

## 2018-05-17 LAB
ANION GAP SERPL CALC-SCNC: 15 MMOL/L — SIGNIFICANT CHANGE UP (ref 5–17)
ANION GAP SERPL CALC-SCNC: 17 MMOL/L — SIGNIFICANT CHANGE UP (ref 5–17)
BUN SERPL-MCNC: 58 MG/DL — HIGH (ref 8–20)
BUN SERPL-MCNC: 58 MG/DL — HIGH (ref 8–20)
CALCIUM SERPL-MCNC: 8.7 MG/DL — SIGNIFICANT CHANGE UP (ref 8.6–10.2)
CALCIUM SERPL-MCNC: 9 MG/DL — SIGNIFICANT CHANGE UP (ref 8.6–10.2)
CHLORIDE SERPL-SCNC: 116 MMOL/L — HIGH (ref 98–107)
CHLORIDE SERPL-SCNC: 119 MMOL/L — HIGH (ref 98–107)
CO2 SERPL-SCNC: 19 MMOL/L — LOW (ref 22–29)
CO2 SERPL-SCNC: 20 MMOL/L — LOW (ref 22–29)
CREAT SERPL-MCNC: 1.86 MG/DL — HIGH (ref 0.5–1.3)
CREAT SERPL-MCNC: 1.89 MG/DL — HIGH (ref 0.5–1.3)
GLUCOSE SERPL-MCNC: 139 MG/DL — HIGH (ref 70–115)
GLUCOSE SERPL-MCNC: 145 MG/DL — HIGH (ref 70–115)
HCT VFR BLD CALC: 29.1 % — LOW (ref 37–47)
HGB BLD-MCNC: 9.1 G/DL — LOW (ref 12–16)
MAGNESIUM SERPL-MCNC: 1.9 MG/DL — SIGNIFICANT CHANGE UP (ref 1.8–2.6)
MCHC RBC-ENTMCNC: 27.6 PG — SIGNIFICANT CHANGE UP (ref 27–31)
MCHC RBC-ENTMCNC: 31.3 G/DL — LOW (ref 32–36)
MCV RBC AUTO: 88.2 FL — SIGNIFICANT CHANGE UP (ref 81–99)
PLATELET # BLD AUTO: 282 K/UL — SIGNIFICANT CHANGE UP (ref 150–400)
POTASSIUM SERPL-MCNC: 3.6 MMOL/L — SIGNIFICANT CHANGE UP (ref 3.5–5.3)
POTASSIUM SERPL-MCNC: 4 MMOL/L — SIGNIFICANT CHANGE UP (ref 3.5–5.3)
POTASSIUM SERPL-SCNC: 3.6 MMOL/L — SIGNIFICANT CHANGE UP (ref 3.5–5.3)
POTASSIUM SERPL-SCNC: 4 MMOL/L — SIGNIFICANT CHANGE UP (ref 3.5–5.3)
RBC # BLD: 3.3 M/UL — LOW (ref 4.4–5.2)
RBC # FLD: 14.5 % — SIGNIFICANT CHANGE UP (ref 11–15.6)
SODIUM SERPL-SCNC: 151 MMOL/L — HIGH (ref 135–145)
SODIUM SERPL-SCNC: 155 MMOL/L — HIGH (ref 135–145)
WBC # BLD: 11.6 K/UL — HIGH (ref 4.8–10.8)
WBC # FLD AUTO: 11.6 K/UL — HIGH (ref 4.8–10.8)

## 2018-05-17 PROCEDURE — 99233 SBSQ HOSP IP/OBS HIGH 50: CPT

## 2018-05-17 RX ORDER — SODIUM CHLORIDE 9 MG/ML
1000 INJECTION, SOLUTION INTRAVENOUS
Qty: 0 | Refills: 0 | Status: DISCONTINUED | OUTPATIENT
Start: 2018-05-17 | End: 2018-05-22

## 2018-05-17 RX ORDER — RISPERIDONE 4 MG/1
0.25 TABLET ORAL
Qty: 0 | Refills: 0 | Status: DISCONTINUED | OUTPATIENT
Start: 2018-05-17 | End: 2018-05-22

## 2018-05-17 RX ORDER — ACETAMINOPHEN 500 MG
1000 TABLET ORAL ONCE
Qty: 0 | Refills: 0 | Status: COMPLETED | OUTPATIENT
Start: 2018-05-17 | End: 2018-05-17

## 2018-05-17 RX ADMIN — LATANOPROST 1 DROP(S): 0.05 SOLUTION/ DROPS OPHTHALMIC; TOPICAL at 23:41

## 2018-05-17 RX ADMIN — RISPERIDONE 0.5 MILLIGRAM(S): 4 TABLET ORAL at 06:36

## 2018-05-17 RX ADMIN — SIMVASTATIN 40 MILLIGRAM(S): 20 TABLET, FILM COATED ORAL at 23:40

## 2018-05-17 RX ADMIN — Medication 1000 MILLIGRAM(S): at 13:47

## 2018-05-17 RX ADMIN — AMPICILLIN SODIUM AND SULBACTAM SODIUM 100 GRAM(S): 250; 125 INJECTION, POWDER, FOR SUSPENSION INTRAMUSCULAR; INTRAVENOUS at 11:51

## 2018-05-17 RX ADMIN — AMLODIPINE BESYLATE 10 MILLIGRAM(S): 2.5 TABLET ORAL at 06:36

## 2018-05-17 RX ADMIN — Medication 250 MILLIGRAM(S): at 06:36

## 2018-05-17 RX ADMIN — AMPICILLIN SODIUM AND SULBACTAM SODIUM 100 GRAM(S): 250; 125 INJECTION, POWDER, FOR SUSPENSION INTRAMUSCULAR; INTRAVENOUS at 00:08

## 2018-05-17 RX ADMIN — ENOXAPARIN SODIUM 40 MILLIGRAM(S): 100 INJECTION SUBCUTANEOUS at 11:49

## 2018-05-17 RX ADMIN — MEMANTINE HYDROCHLORIDE 5 MILLIGRAM(S): 10 TABLET ORAL at 11:49

## 2018-05-17 RX ADMIN — Medication 400 MILLIGRAM(S): at 13:10

## 2018-05-17 RX ADMIN — SODIUM CHLORIDE 75 MILLILITER(S): 9 INJECTION, SOLUTION INTRAVENOUS at 10:26

## 2018-05-17 RX ADMIN — HYDROMORPHONE HYDROCHLORIDE 0.5 MILLIGRAM(S): 2 INJECTION INTRAMUSCULAR; INTRAVENOUS; SUBCUTANEOUS at 00:08

## 2018-05-17 RX ADMIN — AMPICILLIN SODIUM AND SULBACTAM SODIUM 100 GRAM(S): 250; 125 INJECTION, POWDER, FOR SUSPENSION INTRAMUSCULAR; INTRAVENOUS at 06:36

## 2018-05-17 RX ADMIN — RISPERIDONE 0.25 MILLIGRAM(S): 4 TABLET ORAL at 18:10

## 2018-05-17 RX ADMIN — Medication 10 MILLIGRAM(S): at 06:36

## 2018-05-17 RX ADMIN — SERTRALINE 100 MILLIGRAM(S): 25 TABLET, FILM COATED ORAL at 11:49

## 2018-05-17 RX ADMIN — LISINOPRIL 5 MILLIGRAM(S): 2.5 TABLET ORAL at 06:36

## 2018-05-17 RX ADMIN — AMPICILLIN SODIUM AND SULBACTAM SODIUM 100 GRAM(S): 250; 125 INJECTION, POWDER, FOR SUSPENSION INTRAMUSCULAR; INTRAVENOUS at 18:10

## 2018-05-17 RX ADMIN — AMPICILLIN SODIUM AND SULBACTAM SODIUM 100 GRAM(S): 250; 125 INJECTION, POWDER, FOR SUSPENSION INTRAMUSCULAR; INTRAVENOUS at 23:51

## 2018-05-17 RX ADMIN — Medication 250 MILLIGRAM(S): at 18:10

## 2018-05-17 RX ADMIN — LIDOCAINE 1 PATCH: 4 CREAM TOPICAL at 11:49

## 2018-05-17 RX ADMIN — HYDROMORPHONE HYDROCHLORIDE 0.5 MILLIGRAM(S): 2 INJECTION INTRAMUSCULAR; INTRAVENOUS; SUBCUTANEOUS at 01:08

## 2018-05-17 RX ADMIN — Medication 10 MILLIGRAM(S): at 18:10

## 2018-05-17 RX ADMIN — SENNA PLUS 2 TABLET(S): 8.6 TABLET ORAL at 23:40

## 2018-05-17 RX ADMIN — HYDROMORPHONE HYDROCHLORIDE 0.5 MILLIGRAM(S): 2 INJECTION INTRAMUSCULAR; INTRAVENOUS; SUBCUTANEOUS at 23:40

## 2018-05-17 NOTE — PROGRESS NOTE ADULT - ASSESSMENT
89 yo F with h/o dementia, CAD here with right hip fracture.      Problem/Plan - 1:  ·  Problem: Anemia due to blood loss.  Plan: Postoperative - acute  stable  S/p 1u PRBC      Problem/Plan - 2:  ·  Problem: Aspiration pneumonia of right lower lobe, unspecified aspiration pneumonia type.  Plan: 2/2 dysphagia  c/w unasyn.      Problem/Plan - 3:  ·  Problem: Pharyngeal dysphagia.  --> speech following  --> continue current care     Problem/Plan - 4:  ·  Problem: Closed fracture of right hip, initial encounter.  Plan: s/p partial hemiarthroplasty  PT eval noted - rehab  c/w pain control.      Problem/Plan - 5:  ·  Problem: Dementia with behavioral disturbance, unspecified dementia type.  Plan: supportive care  c/w buspirone and sertraline.      Problem/Plan - 6:  Problem: Essential hypertension. Plan: normotensive  c/w amlodipine.     Problem/Plan - 7:  ·  Problem: Coronary artery disease involving native coronary artery of native heart without angina pectoris.  Plan: c/w asa and statin.      Problem/Plan - 8:  ·  Problem: Glaucoma.  Plan: home meds.      Problem/Plan - 9:  ·  Problem: hypernatremia --> start d5w  --> recheck level at 4pm    10)  NAI --> stop ace-I and celocoxib   --> bmp at 4pm

## 2018-05-17 NOTE — PROGRESS NOTE ADULT - SUBJECTIVE AND OBJECTIVE BOX
INTERVAL HPI/OVERNIGHT EVENTS: 89yo Female Patient PMH of Mild [facility] sustained R Femur fracture S/P R THR  has had hyperactive Delirium since being admitted after her injury. Patient is calmer and sleeping more today, did eat breakfast -was fed by staff.  Exhibiting signs of pain behaviors when RN turning positioning rendering care. Pharyngeal secretions are audible. Appears comfortable no longer moaning or recoiling when touched.  There were no acute events overnight. Afebrile, behaviors controlled R Hip dressing clean/dry abduction pillow is in place.  CC: Anxiety and agitation subsiding with medication managment. Assume pain is present when delivering physical care  89y old  Female who presents with a chief complaint of fall (14 May 2018 07:19)    PAST MEDICAL & SURGICAL HISTORY:  Dementia  Coronary artery disease involving native coronary artery of native heart without angina pectoris  High cholesterol  HTN (hypertension)  DM (diabetes mellitus)  S/P cataract extraction and insertion of intraocular lens, right  S/P cataract extraction and insertion of intraocular lens, left        Present Symptoms:     Dyspnea: 0 1 2 3   Nausea/Vomiting: Yes No  Anxiety:  Yes No  Depression: Yes No  Fatigue: Yes No  Loss of appetite: Yes No    Pain:             Character-            Duration-            Effect-            Factors-            Frequency-            Location-            Severity-    Review of Systems: Reviewed                     Negative:                     Positive:  Unable to obtain due to poor mentation   All others negative    MEDICATIONS  (STANDING):  acetaminophen  IVPB. 1000 milliGRAM(s) IV Intermittent once  amLODIPine   Tablet 10 milliGRAM(s) Oral daily  ampicillin/sulbactam  IVPB      ampicillin/sulbactam  IVPB 1.5 Gram(s) IV Intermittent every 6 hours  busPIRone 10 milliGRAM(s) Oral two times a day  dextrose 5%. 1000 milliLiter(s) (75 mL/Hr) IV Continuous <Continuous>  enoxaparin Injectable 40 milliGRAM(s) SubCutaneous daily  fentaNYL   Patch  12 MICROgram(s)/Hr 1 Patch Transdermal every 72 hours  latanoprost 0.005% Ophthalmic Solution 1 Drop(s) Both EYES at bedtime  lidocaine   Patch 1 Patch Transdermal daily  memantine 5 milliGRAM(s) Oral daily  risperiDONE  Disintegrating Tablet 0.25 milliGRAM(s) Oral two times a day  saccharomyces boulardii 250 milliGRAM(s) Oral two times a day  senna 2 Tablet(s) Oral at bedtime  sertraline 100 milliGRAM(s) Oral daily  simvastatin 40 milliGRAM(s) Oral at bedtime    MEDICATIONS  (PRN):  bisacodyl Suppository 10 milliGRAM(s) Rectal daily PRN Constipation  HYDROmorphone  Injectable 0.5 milliGRAM(s) IV Push every 3 hours PRN severe/breakthrough pain      PHYSICAL EXAM:    Vital Signs Last 24 Hrs  T(C): 37.5 (17 May 2018 12:23), Max: 37.5 (17 May 2018 12:23)  T(F): 99.5 (17 May 2018 12:23), Max: 99.5 (17 May 2018 12:23)  HR: 101 (17 May 2018 12:23) (88 - 117)  BP: 151/65 (17 May 2018 12:23) (104/56 - 155/67)  BP(mean): --  RR: 16 (17 May 2018 12:23) (16 - 18)  SpO2: 96% (16 May 2018 21:41) (96% - 96%)    General: alert  oriented x ____ lethargic agitated                  cachexia  nonverbal  coma    Karnofsky:  %    HEENT: normal  dry mouth  ET tube/trach    Lungs: comfortable tachypnea/labored breathing  excessive secretions    CV: normal  tachycardia    GI: normal  distended  tender  no BS               PEG/NG/OG tube  constipation  last BM:     : normal  incontinent  oliguria/anuria  castano    MSK: normal  weakness  edema             ambulatory  bedbound/wheelchair bound    Skin: normal  pressure ulcers- Stage_____  no rash    LABS:                        9.1    11.6  )-----------( 282      ( 17 May 2018 07:32 )             29.1     05-17    155<H>  |  119<H>  |  58.0<H>  ----------------------------<  145<H>  4.0   |  19.0<L>  |  1.86<H>    Ca    9.0      17 May 2018 07:32  Mg     1.9     05-17          I&O's Summary    16 May 2018 07:01  -  17 May 2018 07:00  --------------------------------------------------------  IN: 930 mL / OUT: 1 mL / NET: 929 mL        RADIOLOGY & ADDITIONAL STUDIES:    ADVANCE DIRECTIVES:   DNR YES  Completed on:                     ROGERIO  YES   Completed on:  Living Will   NO   Completed on: INTERVAL HPI/OVERNIGHT EVENTS: 89yo Female Patient PM[facility] of Mild [facility] sustained R Femur fracture S/P R THR  has had hyperactive Delirium since being admitted after her injury. Patient is calmer and sleeping more today, did eat breakfast -was fed by staff.  Exhibiting signs of pain behaviors when RN turning positioning rendering care. Pharyngeal secretions are audible. Appears comfortable no longer moaning or recoiling when touched.  There were no acute events overnight. Afebrile, behaviors controlled R Hip dressing clean/dry abduction pillow is in place.    CC: Anxiety and agitation subsiding with medication managment. Assume pain is present when delivering physical care  89y old  Female who presents with a chief complaint of fall (14 May 2018 07:19)    PAST MEDICAL & SURGICAL HISTORY:  Dementia  Coronary artery disease involving native coronary artery of native heart without angina pectoris  High cholesterol  HTN (hypertension)  DM (diabetes mellitus)  S/P cataract extraction and insertion of intraocular lens, right  S/P cataract extraction and insertion of intraocular lens, left        Present Symptoms:     Dyspnea:  1 -2 secretions audible  Nausea/Vomiting:  No  Anxiety:  Yes   Depression:  No  Fatigue: Yes   Loss of appetite:  No    Pain: R Hip Pain  OA  ASSUME PAIN IS PRESENT            Character-            Duration-            Effect-            Factors-            Frequency-            Location-            Severity-    Review of Systems: Reviewed                                      Unable to obtain due to poor mentation       MEDICATIONS  (STANDING):  acetaminophen  IVPB. 1000 milliGRAM(s) IV Intermittent once  amLODIPine   Tablet 10 milliGRAM(s) Oral daily  ampicillin/sulbactam  IVPB      ampicillin/sulbactam  IVPB 1.5 Gram(s) IV Intermittent every 6 hours  busPIRone 10 milliGRAM(s) Oral two times a day  dextrose 5%. 1000 milliLiter(s) (75 mL/Hr) IV Continuous <Continuous>  enoxaparin Injectable 40 milliGRAM(s) SubCutaneous daily  fentaNYL   Patch  12 MICROgram(s)/Hr 1 Patch Transdermal every 72 hours  latanoprost 0.005% Ophthalmic Solution 1 Drop(s) Both EYES at bedtime  lidocaine   Patch 1 Patch Transdermal daily  memantine 5 milliGRAM(s) Oral daily  risperiDONE  Disintegrating Tablet 0.25 milliGRAM(s) Oral two times a day  saccharomyces boulardii 250 milliGRAM(s) Oral two times a day  senna 2 Tablet(s) Oral at bedtime  sertraline 100 milliGRAM(s) Oral daily  simvastatin 40 milliGRAM(s) Oral at bedtime    MEDICATIONS  (PRN):  bisacodyl Suppository 10 milliGRAM(s) Rectal daily PRN Constipation  HYDROmorphone  Injectable 0.5 milliGRAM(s) IV Push every 3 hours PRN severe/breakthrough pain      PHYSICAL EXAM:    Vital Signs Last 24 Hrs  T(C): 37.5 (17 May 2018 12:23), Max: 37.5 (17 May 2018 12:23)  T(F): 99.5 (17 May 2018 12:23), Max: 99.5 (17 May 2018 12:23)  HR: 101 (17 May 2018 12:23) (88 - 117)  BP: 151/65 (17 May 2018 12:23) (104/56 - 155/67)  BP(mean): --  RR: 16 (17 May 2018 12:23) (16 - 18)  SpO2: 96% (16 May 2018 21:41) (96% - 96%)    General: _ lethargic                   cachexia  nonverbal  sleeping        HEENT:   dry mouth    Lungs:  tachypnea/labored breathing  secretions    CV:  tachycardia    GI: distended           constipation  last BM:     : normal    MSK:  weakness            bedbound since surgery    Skin: normal __  no rash    LABS:                        9.1    11.6  )-----------( 282      ( 17 May 2018 07:32 )             29.1     05-17    155<H>  |  119<H>  |  58.0<H>  ----------------------------<  145<H>  4.0   |  19.0<L>  |  1.86<H>    Ca    9.0      17 May 2018 07:32  Mg     1.9     05-17          I&O's Summary    16 May 2018 07:01  -  17 May 2018 07:00  --------------------------------------------------------  IN: 930 mL / OUT: 1 mL / NET: 929 mL        RADIOLOGY & ADDITIONAL STUDIES:    ADVANCE DIRECTIVES:   DNR YES  Completed on:                     MOLST  YES   Completed on:  Living Will   NO   Completed on:

## 2018-05-17 NOTE — PROGRESS NOTE ADULT - ASSESSMENT
Pain and Discomfort--  Continue Fentnayl 12 mcg/hr patch  Ofirmev IV infusion now  Dilaudid IV for BTP  Family wants Comfort and aggressive pain mgmt.    Hyperactive Delirium with Underlying Dementia  Continue oral medications  Reducing Risperdal to 0.25mg PO Q12  DC Haldol IM    Aspiration Pneumonia  IV Antibiotics 89 yo F S/P Fall     R Femur Fracture S/P R THR    Post injury hyperactive Delirium  with underlying functional Dementia  Seems to be improving today-was fed and much calmer    Agitation Anxiety   Risperdal < to 0.25 mg BID is calm and sleepy  DC Haldol IM  Continue to treat pain and discomfort    Pain and Discomfort--  Continue Fentanyl 12 mcg/hr patch  Ofirmev IV infusion now  Dilaudid IV for BTP  Family wants Comfort and aggressive pain mgmt.    Aspiration Pneumonia  Dysphagia with AMS  IV Antibiotics Complete course ABX  Family wants to continue comfort feeds  Refusing Peg                                        Plan: Hospice met with son Ted, will re-evaluate later in hosptalization                                                                     Patient will most likely not be able to return to [facility]

## 2018-05-17 NOTE — PROGRESS NOTE ADULT - SUBJECTIVE AND OBJECTIVE BOX
KYLE SCHULTZ    [id number]    89y      Female    INTERVAL HPI/OVERNIGHT EVENTS:    Hospital course:  89 yo F with h/o dementia, CAD presents from assisted living facility s/p fall. In the ED, found to have right comminuted femoral neck fracture. On 5/11, had partial hemiarthroplasty. On 5/14, Hgb dropped to 7.1. Received 1u PRBC. Speech therapy found pt with pharyngeal dysphagia. As per both HCP, Ted and Jordi Schultz, they are concerned for her comfort and refuse feeding tubes at this time. They understand risks of aspiration, pneumonia, and death. She is to have comfort feeds. DNR/DNI     patient seen at bedside and is nonverbal, slightly agitated.       REVIEW OF SYSTEMS:    unable to obtain secondary to mentla status       Vital Signs Last 24 Hrs  T(C): 37.5 (17 May 2018 12:23), Max: 37.5 (17 May 2018 12:23)  T(F): 99.5 (17 May 2018 12:23), Max: 99.5 (17 May 2018 12:23)  HR: 101 (17 May 2018 12:23) (88 - 117)  BP: 151/65 (17 May 2018 12:23) (104/56 - 155/67)  BP(mean): --  RR: 16 (17 May 2018 12:23) (16 - 18)  SpO2: 96% (16 May 2018 21:41) (96% - 96%)    PHYSICAL EXAM:    GENERAL: NAD, nonverbal   HEENT: PERRL, +EOMI, MMM  CHEST/LUNG: Clear to percussion bilaterally; No wheezing  HEART: S1S2+, Regular rate and rhythm   ABDOMEN: Soft, Nontender, Nondistended; Bowel sounds present  EXTREMITIES:  no edema         LABS:                        9.1    11.6  )-----------( 282      ( 17 May 2018 07:32 )             29.1     05-17    155<H>  |  119<H>  |  58.0<H>  ----------------------------<  145<H>  4.0   |  19.0<L>  |  1.86<H>    Ca    9.0      17 May 2018 07:32  Mg     1.9     05-17        MEDICATIONS  (STANDING):  acetaminophen  IVPB. 1000 milliGRAM(s) IV Intermittent once  amLODIPine   Tablet 10 milliGRAM(s) Oral daily  ampicillin/sulbactam  IVPB      ampicillin/sulbactam  IVPB 1.5 Gram(s) IV Intermittent every 6 hours  busPIRone 10 milliGRAM(s) Oral two times a day  dextrose 5%. 1000 milliLiter(s) (75 mL/Hr) IV Continuous <Continuous>  enoxaparin Injectable 40 milliGRAM(s) SubCutaneous daily  fentaNYL   Patch  12 MICROgram(s)/Hr 1 Patch Transdermal every 72 hours  latanoprost 0.005% Ophthalmic Solution 1 Drop(s) Both EYES at bedtime  lidocaine   Patch 1 Patch Transdermal daily  memantine 5 milliGRAM(s) Oral daily  risperiDONE  Disintegrating Tablet 0.25 milliGRAM(s) Oral two times a day  saccharomyces boulardii 250 milliGRAM(s) Oral two times a day  senna 2 Tablet(s) Oral at bedtime  sertraline 100 milliGRAM(s) Oral daily  simvastatin 40 milliGRAM(s) Oral at bedtime    MEDICATIONS  (PRN):  bisacodyl Suppository 10 milliGRAM(s) Rectal daily PRN Constipation  HYDROmorphone  Injectable 0.5 milliGRAM(s) IV Push every 3 hours PRN severe/breakthrough pain      RADIOLOGY & ADDITIONAL TESTS:

## 2018-05-18 LAB
ANION GAP SERPL CALC-SCNC: 16 MMOL/L — SIGNIFICANT CHANGE UP (ref 5–17)
ANION GAP SERPL CALC-SCNC: 17 MMOL/L — SIGNIFICANT CHANGE UP (ref 5–17)
BUN SERPL-MCNC: 61 MG/DL — HIGH (ref 8–20)
BUN SERPL-MCNC: 63 MG/DL — HIGH (ref 8–20)
CALCIUM SERPL-MCNC: 8.2 MG/DL — LOW (ref 8.6–10.2)
CALCIUM SERPL-MCNC: 8.5 MG/DL — LOW (ref 8.6–10.2)
CHLORIDE SERPL-SCNC: 110 MMOL/L — HIGH (ref 98–107)
CHLORIDE SERPL-SCNC: 115 MMOL/L — HIGH (ref 98–107)
CO2 SERPL-SCNC: 20 MMOL/L — LOW (ref 22–29)
CO2 SERPL-SCNC: 20 MMOL/L — LOW (ref 22–29)
CREAT SERPL-MCNC: 1.98 MG/DL — HIGH (ref 0.5–1.3)
CREAT SERPL-MCNC: 2.21 MG/DL — HIGH (ref 0.5–1.3)
GLUCOSE SERPL-MCNC: 131 MG/DL — HIGH (ref 70–115)
GLUCOSE SERPL-MCNC: 151 MG/DL — HIGH (ref 70–115)
HCT VFR BLD CALC: 28.7 % — LOW (ref 37–47)
HGB BLD-MCNC: 8.8 G/DL — LOW (ref 12–16)
MAGNESIUM SERPL-MCNC: 1.9 MG/DL — SIGNIFICANT CHANGE UP (ref 1.8–2.6)
MCHC RBC-ENTMCNC: 27.4 PG — SIGNIFICANT CHANGE UP (ref 27–31)
MCHC RBC-ENTMCNC: 30.7 G/DL — LOW (ref 32–36)
MCV RBC AUTO: 89.4 FL — SIGNIFICANT CHANGE UP (ref 81–99)
PLATELET # BLD AUTO: 268 K/UL — SIGNIFICANT CHANGE UP (ref 150–400)
POTASSIUM SERPL-MCNC: 3.8 MMOL/L — SIGNIFICANT CHANGE UP (ref 3.5–5.3)
POTASSIUM SERPL-MCNC: 3.9 MMOL/L — SIGNIFICANT CHANGE UP (ref 3.5–5.3)
POTASSIUM SERPL-SCNC: 3.8 MMOL/L — SIGNIFICANT CHANGE UP (ref 3.5–5.3)
POTASSIUM SERPL-SCNC: 3.9 MMOL/L — SIGNIFICANT CHANGE UP (ref 3.5–5.3)
RBC # BLD: 3.21 M/UL — LOW (ref 4.4–5.2)
RBC # FLD: 14.4 % — SIGNIFICANT CHANGE UP (ref 11–15.6)
SODIUM SERPL-SCNC: 146 MMOL/L — HIGH (ref 135–145)
SODIUM SERPL-SCNC: 152 MMOL/L — HIGH (ref 135–145)
WBC # BLD: 14.5 K/UL — HIGH (ref 4.8–10.8)
WBC # FLD AUTO: 14.5 K/UL — HIGH (ref 4.8–10.8)

## 2018-05-18 PROCEDURE — 99233 SBSQ HOSP IP/OBS HIGH 50: CPT

## 2018-05-18 RX ORDER — HYDROMORPHONE HYDROCHLORIDE 2 MG/ML
0.5 INJECTION INTRAMUSCULAR; INTRAVENOUS; SUBCUTANEOUS ONCE
Qty: 0 | Refills: 0 | Status: DISCONTINUED | OUTPATIENT
Start: 2018-05-18 | End: 2018-05-18

## 2018-05-18 RX ORDER — ACETAMINOPHEN 500 MG
1000 TABLET ORAL ONCE
Qty: 0 | Refills: 0 | Status: COMPLETED | OUTPATIENT
Start: 2018-05-18 | End: 2018-05-18

## 2018-05-18 RX ORDER — ASPIRIN/CALCIUM CARB/MAGNESIUM 324 MG
81 TABLET ORAL DAILY
Qty: 0 | Refills: 0 | Status: DISCONTINUED | OUTPATIENT
Start: 2018-05-18 | End: 2018-05-22

## 2018-05-18 RX ADMIN — RISPERIDONE 0.25 MILLIGRAM(S): 4 TABLET ORAL at 05:08

## 2018-05-18 RX ADMIN — LIDOCAINE 1 PATCH: 4 CREAM TOPICAL at 02:12

## 2018-05-18 RX ADMIN — Medication 250 MILLIGRAM(S): at 17:18

## 2018-05-18 RX ADMIN — AMPICILLIN SODIUM AND SULBACTAM SODIUM 100 GRAM(S): 250; 125 INJECTION, POWDER, FOR SUSPENSION INTRAMUSCULAR; INTRAVENOUS at 23:51

## 2018-05-18 RX ADMIN — AMLODIPINE BESYLATE 10 MILLIGRAM(S): 2.5 TABLET ORAL at 05:07

## 2018-05-18 RX ADMIN — Medication 10 MILLIGRAM(S): at 05:06

## 2018-05-18 RX ADMIN — LIDOCAINE 1 PATCH: 4 CREAM TOPICAL at 11:42

## 2018-05-18 RX ADMIN — HYDROMORPHONE HYDROCHLORIDE 0.5 MILLIGRAM(S): 2 INJECTION INTRAMUSCULAR; INTRAVENOUS; SUBCUTANEOUS at 11:42

## 2018-05-18 RX ADMIN — Medication 10 MILLIGRAM(S): at 17:18

## 2018-05-18 RX ADMIN — RISPERIDONE 0.25 MILLIGRAM(S): 4 TABLET ORAL at 17:18

## 2018-05-18 RX ADMIN — SODIUM CHLORIDE 75 MILLILITER(S): 9 INJECTION, SOLUTION INTRAVENOUS at 05:05

## 2018-05-18 RX ADMIN — SERTRALINE 100 MILLIGRAM(S): 25 TABLET, FILM COATED ORAL at 11:42

## 2018-05-18 RX ADMIN — ENOXAPARIN SODIUM 40 MILLIGRAM(S): 100 INJECTION SUBCUTANEOUS at 11:42

## 2018-05-18 RX ADMIN — LIDOCAINE 1 PATCH: 4 CREAM TOPICAL at 23:49

## 2018-05-18 RX ADMIN — FENTANYL CITRATE 1 PATCH: 50 INJECTION INTRAVENOUS at 13:53

## 2018-05-18 RX ADMIN — LATANOPROST 1 DROP(S): 0.05 SOLUTION/ DROPS OPHTHALMIC; TOPICAL at 23:50

## 2018-05-18 RX ADMIN — AMPICILLIN SODIUM AND SULBACTAM SODIUM 100 GRAM(S): 250; 125 INJECTION, POWDER, FOR SUSPENSION INTRAMUSCULAR; INTRAVENOUS at 17:18

## 2018-05-18 RX ADMIN — Medication 250 MILLIGRAM(S): at 05:06

## 2018-05-18 RX ADMIN — AMPICILLIN SODIUM AND SULBACTAM SODIUM 100 GRAM(S): 250; 125 INJECTION, POWDER, FOR SUSPENSION INTRAMUSCULAR; INTRAVENOUS at 05:05

## 2018-05-18 RX ADMIN — Medication 81 MILLIGRAM(S): at 17:18

## 2018-05-18 RX ADMIN — MEMANTINE HYDROCHLORIDE 5 MILLIGRAM(S): 10 TABLET ORAL at 11:42

## 2018-05-18 RX ADMIN — HYDROMORPHONE HYDROCHLORIDE 0.5 MILLIGRAM(S): 2 INJECTION INTRAMUSCULAR; INTRAVENOUS; SUBCUTANEOUS at 00:42

## 2018-05-18 RX ADMIN — HYDROMORPHONE HYDROCHLORIDE 0.5 MILLIGRAM(S): 2 INJECTION INTRAMUSCULAR; INTRAVENOUS; SUBCUTANEOUS at 12:40

## 2018-05-18 RX ADMIN — HYDROMORPHONE HYDROCHLORIDE 0.5 MILLIGRAM(S): 2 INJECTION INTRAMUSCULAR; INTRAVENOUS; SUBCUTANEOUS at 05:46

## 2018-05-18 RX ADMIN — FENTANYL CITRATE 1 PATCH: 50 INJECTION INTRAVENOUS at 13:54

## 2018-05-18 RX ADMIN — AMPICILLIN SODIUM AND SULBACTAM SODIUM 100 GRAM(S): 250; 125 INJECTION, POWDER, FOR SUSPENSION INTRAMUSCULAR; INTRAVENOUS at 11:41

## 2018-05-18 RX ADMIN — Medication 400 MILLIGRAM(S): at 13:54

## 2018-05-18 NOTE — PROGRESS NOTE ADULT - ASSESSMENT
Assessment and Plan:   · Assessment		  88 yo F S/P Fall     R Femur Fracture S/P R THR    Post injury hyperactive Delirium  with underlying functional Dementia  Seems to be improving today-was fed and much calmer    Agitation Anxiety   Risperdal < to 0.25 mg BID is calm and sleepy  Continue to treat pain and discomfort    Pain and Discomfort--  Continue Fentanyl 12 mcg/hr patch  Ofirmev IV infusion ordered  Dilaudid IV for BTP ordered dose stat  Family wants Comfort and aggressive pain mgmt.    Aspiration Pneumonia  Dysphagia with AMS  IV Antibiotics Complete course ABX  Family wants to continue comfort feeds  Refusing Peg                                        Plan: Hospice met with son Ted, will re-evaluate later in hospitalization                                                                     Patient will most likely not be able to return to [facility]

## 2018-05-18 NOTE — PROGRESS NOTE ADULT - ASSESSMENT
89 yo F with h/o dementia, CAD here with right hip fracture.      Problem/Plan - 1:  ·  Problem: Anemia due to blood loss.  Plan: Postoperative - acute  stable  S/p 1u PRBC      Problem/Plan - 2:  ·  Problem: Aspiration pneumonia of right lower lobe, unspecified aspiration pneumonia type.  Plan: 2/2 dysphagia  c/w unasyn.      Problem/Plan - 3:  ·  Problem: Pharyngeal dysphagia.  -->  --> continue current care     Problem/Plan - 4:  ·  Problem: Closed fracture of right hip, initial encounter.  Plan: s/p partial hemiarthroplasty  PT eval noted - rehab  c/w pain control.      Problem/Plan - 5:  ·  Problem: Dementia with behavioral disturbance, unspecified dementia type.  Plan: supportive care  c/w buspirone and sertraline.      Problem/Plan - 6:  Problem: Essential hypertension. Plan: normotensive  c/w amlodipine.     Problem/Plan - 7:  ·  Problem: Coronary artery disease involving native coronary artery of native heart without angina pectoris.  Plan: c/w asa and statin.      Problem/Plan - 8:  ·  Problem: Glaucoma.  Plan: home meds.      Problem/Plan - 9:  ·  Problem: hypernatremia --> stable  --> c.w d5w fluids    10)  NAI --> worsening  --> c.w fluids  --> avoid nephrotoxic agents  --> ace-I and celocoxib on hold     palliative following  --> home with hospice vs inpatient hospice

## 2018-05-18 NOTE — PROGRESS NOTE ADULT - SUBJECTIVE AND OBJECTIVE BOX
KYLE SCHULTZ    [id number]    89y      Female    INTERVAL HPI/OVERNIGHT EVENTS:    89 yo F with h/o dementia, CAD presents from assisted living facility s/p fall. In the ED, found to have right comminuted femoral neck fracture. On 5/11, had partial hemiarthroplasty. On 5/14, Hgb dropped to 7.1. Received 1u PRBC. Speech therapy found pt with pharyngeal dysphagia. As per both HCP, Ted and Jordi Schultz, they are concerned for her comfort and refuse feeding tubes at this time. They understand risks of aspiration, pneumonia, and death. She is to have comfort feeds. DNR/DNI     patient seen at bedside and nonverbal, in nad      REVIEW OF SYSTEMS:    unable to obtain secondary to mental status     Vital Signs Last 24 Hrs  T(C): 37.4 (18 May 2018 04:40), Max: 37.5 (17 May 2018 12:23)  T(F): 99.4 (18 May 2018 04:40), Max: 99.5 (17 May 2018 12:23)  HR: 85 (18 May 2018 04:40) (79 - 101)  BP: 151/66 (18 May 2018 04:40) (118/54 - 151/66)  BP(mean): --  RR: 17 (18 May 2018 04:40) (16 - 18)  SpO2: --    PHYSICAL EXAM:    GENERAL: NAD, nonverbal   HEENT: PERRL, +EOMI, MMM  CHEST/LUNG: Clear to percussion bilaterally; No wheezing  HEART: S1S2+, Regular rate and rhythm   ABDOMEN: Soft, tender on deep palpation   EXTREMITIES:  no edema       LABS:                        8.8    14.5  )-----------( 268      ( 18 May 2018 07:20 )             28.7     05-18    152<H>  |  115<H>  |  61.0<H>  ----------------------------<  131<H>  3.9   |  20.0<L>  |  1.98<H>    Ca    8.5<L>      18 May 2018 07:20  Mg     1.9     05-18              MEDICATIONS  (STANDING):  acetaminophen  IVPB 1000 milliGRAM(s) IV Intermittent once  amLODIPine   Tablet 10 milliGRAM(s) Oral daily  ampicillin/sulbactam  IVPB      ampicillin/sulbactam  IVPB 1.5 Gram(s) IV Intermittent every 6 hours  busPIRone 10 milliGRAM(s) Oral two times a day  dextrose 5%. 1000 milliLiter(s) (75 mL/Hr) IV Continuous <Continuous>  enoxaparin Injectable 40 milliGRAM(s) SubCutaneous daily  fentaNYL   Patch  12 MICROgram(s)/Hr 1 Patch Transdermal every 72 hours  latanoprost 0.005% Ophthalmic Solution 1 Drop(s) Both EYES at bedtime  lidocaine   Patch 1 Patch Transdermal daily  memantine 5 milliGRAM(s) Oral daily  risperiDONE  Disintegrating Tablet 0.25 milliGRAM(s) Oral two times a day  saccharomyces boulardii 250 milliGRAM(s) Oral two times a day  senna 2 Tablet(s) Oral at bedtime  sertraline 100 milliGRAM(s) Oral daily  simvastatin 40 milliGRAM(s) Oral at bedtime    MEDICATIONS  (PRN):  bisacodyl Suppository 10 milliGRAM(s) Rectal daily PRN Constipation  HYDROmorphone  Injectable 0.5 milliGRAM(s) IV Push every 3 hours PRN severe/breakthrough pain      RADIOLOGY & ADDITIONAL TESTS:

## 2018-05-18 NOTE — PROGRESS NOTE ADULT - SUBJECTIVE AND OBJECTIVE BOX
INTERVAL HPI/OVERNIGHT EVENTS: 89yo Female Patient PMH HTN, DM CAD And Dementia  S/P Fall R Femur was fractured; she is now S/P RTHR with PNA  has been in a hyperactive delirium since admission. Today she is resting comfortably-moaning when you talk to her, eyes partially open  Ate 50% breakfast this morning-full feed. RR 20min Nasal O2 continuous. IV Fluids at 75ml/hr.  CC:  Moaning and calling for her  saying OW when I was taking her pedal pulse. Dressing dry and clean. Lst breakthrough Dilauduid 0.5mg IV  given at 0500 6 hours ago.  89y old  Female who presents with a chief complaint of fall (14 May 2018 07:19)      PAST MEDICAL & SURGICAL HISTORY:  Dementia  Coronary artery disease involving native coronary artery of native heart without angina pectoris  High cholesterol  HTN (hypertension)  DM (diabetes mellitus)  S/P cataract extraction and insertion of intraocular lens, right  S/P cataract extraction and insertion of intraocular lens, left    Present Symptoms:     Dyspnea:  1- 2    Nausea/Vomiting:  No  Anxiety:  Yes No  Fatigue: Yes   Loss of appetite: No    Pain: Assume Pain is Present R HIP            Character-            Duration-            Effect-            Factors-            Frequency-            Location-            Severity-moderate    Review of Systems: Reviewed                   Unable to obtain due to poor mentation       MEDICATIONS  (STANDING):  acetaminophen  IVPB 1000 milliGRAM(s) IV Intermittent once  amLODIPine   Tablet 10 milliGRAM(s) Oral daily  ampicillin/sulbactam  IVPB      ampicillin/sulbactam  IVPB 1.5 Gram(s) IV Intermittent every 6 hours  busPIRone 10 milliGRAM(s) Oral two times a day  dextrose 5%. 1000 milliLiter(s) (75 mL/Hr) IV Continuous <Continuous>  enoxaparin Injectable 40 milliGRAM(s) SubCutaneous daily  fentaNYL   Patch  12 MICROgram(s)/Hr 1 Patch Transdermal every 72 hours  HYDROmorphone  Injectable 0.5 milliGRAM(s) IV Push once  latanoprost 0.005% Ophthalmic Solution 1 Drop(s) Both EYES at bedtime  lidocaine   Patch 1 Patch Transdermal daily  memantine 5 milliGRAM(s) Oral daily  risperiDONE  Disintegrating Tablet 0.25 milliGRAM(s) Oral two times a day  saccharomyces boulardii 250 milliGRAM(s) Oral two times a day  senna 2 Tablet(s) Oral at bedtime  sertraline 100 milliGRAM(s) Oral daily  simvastatin 40 milliGRAM(s) Oral at bedtime    MEDICATIONS  (PRN):  bisacodyl Suppository 10 milliGRAM(s) Rectal daily PRN Constipation  HYDROmorphone  Injectable 0.5 milliGRAM(s) IV Push every 3 hours PRN severe/breakthrough pain      PHYSICAL EXAM:    Vital Signs Last 24 Hrs  T(C): 37.4 (18 May 2018 04:40), Max: 37.5 (17 May 2018 12:23)  T(F): 99.4 (18 May 2018 04:40), Max: 99.5 (17 May 2018 12:23)  HR: 85 (18 May 2018 04:40) (79 - 101)  BP: 151/66 (18 May 2018 04:40) (118/54 - 151/66)  BP(mean): --  RR: 17 (18 May 2018 04:40) (16 - 18)  SpO2: --    General: ___ lethargic agitated                  cachexia  nonverbal      HEENT: normal  dry mouth      Lungs: comfortable  R mid to base crackles    CV: normal      GI: soft distended                 constipation  last BM:     :   incontinent      MSK:  weakness  Not moving her extremities on her own. Abduction pillow between her legs            ambulatory  bedbound    Skin: normal  _  no rash    LABS:                        8.8    14.5  )-----------( 268      ( 18 May 2018 07:20 )             28.7     05-18    152<H>  |  115<H>  |  61.0<H>  ----------------------------<  131<H>  3.9   |  20.0<L>  |  1.98<H>    Ca    8.5<L>      18 May 2018 07:20  Mg     1.9     05-18    I&O's Summary    17 May 2018 07:01  -  18 May 2018 07:00  --------------------------------------------------------  IN: 350 mL / OUT: 0 mL / NET: 350 mL    RADIOLOGY & ADDITIONAL STUDIES:  < from: Xray Chest 1 View AP/PA. (05.15.18 @ 10:23) >  INDINGS:   The lungs  show linear infiltrate/atelectasis in the right lower lobe   base. Remaining lung parenchyma clear. Sheet artifacts obscure the apices   of lungs.  Heart size normal with calcified aortic arch.             Visualized osseous structures are intact.        IMPRESSION:   Right lung base a linear atelectasis/infiltrate.            ADVANCE DIRECTIVES:   DNR YES  Completed on:                     MOLST  YES  Completed on:  Living Will  NO   Completed on:

## 2018-05-19 LAB
ANION GAP SERPL CALC-SCNC: 17 MMOL/L — SIGNIFICANT CHANGE UP (ref 5–17)
BUN SERPL-MCNC: 61 MG/DL — HIGH (ref 8–20)
CALCIUM SERPL-MCNC: 8 MG/DL — LOW (ref 8.6–10.2)
CHLORIDE SERPL-SCNC: 105 MMOL/L — SIGNIFICANT CHANGE UP (ref 98–107)
CO2 SERPL-SCNC: 19 MMOL/L — LOW (ref 22–29)
CREAT SERPL-MCNC: 2.52 MG/DL — HIGH (ref 0.5–1.3)
GLUCOSE SERPL-MCNC: 149 MG/DL — HIGH (ref 70–115)
HCT VFR BLD CALC: 28.3 % — LOW (ref 37–47)
HGB BLD-MCNC: 8.8 G/DL — LOW (ref 12–16)
MAGNESIUM SERPL-MCNC: 1.8 MG/DL — SIGNIFICANT CHANGE UP (ref 1.6–2.6)
MCHC RBC-ENTMCNC: 27.8 PG — SIGNIFICANT CHANGE UP (ref 27–31)
MCHC RBC-ENTMCNC: 31.1 G/DL — LOW (ref 32–36)
MCV RBC AUTO: 89.6 FL — SIGNIFICANT CHANGE UP (ref 81–99)
PLATELET # BLD AUTO: 246 K/UL — SIGNIFICANT CHANGE UP (ref 150–400)
POTASSIUM SERPL-MCNC: 3.8 MMOL/L — SIGNIFICANT CHANGE UP (ref 3.5–5.3)
POTASSIUM SERPL-SCNC: 3.8 MMOL/L — SIGNIFICANT CHANGE UP (ref 3.5–5.3)
RBC # BLD: 3.16 M/UL — LOW (ref 4.4–5.2)
RBC # FLD: 14.2 % — SIGNIFICANT CHANGE UP (ref 11–15.6)
SODIUM SERPL-SCNC: 141 MMOL/L — SIGNIFICANT CHANGE UP (ref 135–145)
WBC # BLD: 15.9 K/UL — HIGH (ref 4.8–10.8)
WBC # FLD AUTO: 15.9 K/UL — HIGH (ref 4.8–10.8)

## 2018-05-19 PROCEDURE — 99233 SBSQ HOSP IP/OBS HIGH 50: CPT

## 2018-05-19 RX ADMIN — Medication 250 MILLIGRAM(S): at 06:22

## 2018-05-19 RX ADMIN — LIDOCAINE 1 PATCH: 4 CREAM TOPICAL at 12:23

## 2018-05-19 RX ADMIN — AMPICILLIN SODIUM AND SULBACTAM SODIUM 100 GRAM(S): 250; 125 INJECTION, POWDER, FOR SUSPENSION INTRAMUSCULAR; INTRAVENOUS at 17:43

## 2018-05-19 RX ADMIN — LIDOCAINE 1 PATCH: 4 CREAM TOPICAL at 23:27

## 2018-05-19 RX ADMIN — AMLODIPINE BESYLATE 10 MILLIGRAM(S): 2.5 TABLET ORAL at 06:23

## 2018-05-19 RX ADMIN — AMPICILLIN SODIUM AND SULBACTAM SODIUM 100 GRAM(S): 250; 125 INJECTION, POWDER, FOR SUSPENSION INTRAMUSCULAR; INTRAVENOUS at 23:29

## 2018-05-19 RX ADMIN — AMPICILLIN SODIUM AND SULBACTAM SODIUM 100 GRAM(S): 250; 125 INJECTION, POWDER, FOR SUSPENSION INTRAMUSCULAR; INTRAVENOUS at 12:23

## 2018-05-19 RX ADMIN — LATANOPROST 1 DROP(S): 0.05 SOLUTION/ DROPS OPHTHALMIC; TOPICAL at 23:28

## 2018-05-19 RX ADMIN — AMPICILLIN SODIUM AND SULBACTAM SODIUM 100 GRAM(S): 250; 125 INJECTION, POWDER, FOR SUSPENSION INTRAMUSCULAR; INTRAVENOUS at 06:22

## 2018-05-19 RX ADMIN — ENOXAPARIN SODIUM 40 MILLIGRAM(S): 100 INJECTION SUBCUTANEOUS at 12:23

## 2018-05-19 RX ADMIN — SODIUM CHLORIDE 75 MILLILITER(S): 9 INJECTION, SOLUTION INTRAVENOUS at 17:43

## 2018-05-19 NOTE — PROGRESS NOTE ADULT - ASSESSMENT
89 yo F with h/o dementia, CAD here with right hip fracture.      Problem/Plan - 1:  ·  Problem: Anemia due to blood loss.  Plan: Postoperative - acute  stable  S/p 1u PRBC      Problem/Plan - 2:  ·  Problem: Aspiration pneumonia of right lower lobe, unspecified aspiration pneumonia type.  Plan: 2/2 dysphagia  c/w unasyn  --> last day tomorrow     Problem/Plan - 3:  ·  Problem: Pharyngeal dysphagia.  -->  --> continue current care     Problem/Plan - 4:  ·  Problem: Closed fracture of right hip, initial encounter.  Plan: s/p partial hemiarthroplasty  PT eval noted - rehab  c/w pain control.      Problem/Plan - 5:  ·  Problem: Dementia with behavioral disturbance, unspecified dementia type.  Plan: supportive care  c/w buspirone and sertraline.      Problem/Plan - 6:  Problem: Essential hypertension. Plan: normotensive  c/w amlodipine.     Problem/Plan - 7:  ·  Problem: Coronary artery disease involving native coronary artery of native heart without angina pectoris.  Plan: c/w asa and statin.      Problem/Plan - 8:  ·  Problem: Glaucoma.  Plan: home meds.      Problem/Plan - 9:  ·  Problem: hypernatremia --> stable  --> c.w d5w fluids    10)  NAI --> worsening  --> c.w fluids    palliative following  --> home with hospice vs inpatient hospice    spoke to family today to update them

## 2018-05-19 NOTE — PROGRESS NOTE ADULT - SUBJECTIVE AND OBJECTIVE BOX
KYLE SCHULTZ    [id number]    89y      Female    INTERVAL HPI/OVERNIGHT EVENTS:      89 yo F with h/o dementia, CAD presents from assisted living facility s/p fall. In the ED, found to have right comminuted femoral neck fracture. On 5/11, had partial hemiarthroplasty. On 5/14, Hgb dropped to 7.1. Received 1u PRBC. Speech therapy found pt with pharyngeal dysphagia. As per both HCP, Ted and Jordi Schultz, they are concerned for her comfort and refuse feeding tubes at this time. They understand risks of aspiration, pneumonia, and death. She is to have comfort feeds. DNR/DNI     patient seen at bedside and is slightly more awake but still lethargic     REVIEW OF SYSTEMS:    unable to obtain secondary to mental status       Vital Signs Last 24 Hrs  T(C): 37.3 (19 May 2018 04:37), Max: 37.4 (18 May 2018 16:52)  T(F): 99.2 (19 May 2018 04:37), Max: 99.3 (18 May 2018 16:52)  HR: 88 (19 May 2018 04:37) (75 - 88)  BP: 148/55 (19 May 2018 04:37) (123/48 - 148/55)  BP(mean): --  RR: 16 (19 May 2018 04:37) (16 - 16)  SpO2: 98% (19 May 2018 04:37) (98% - 98%)    PHYSICAL EXAM:    GENERAL: NAD, nonverbal   HEENT: PERRL, +EOMI, MMM  CHEST/LUNG: Clear to percussion bilaterally; No wheezing  HEART: S1S2+, Regular rate and rhythm   ABDOMEN: Soft, tender on deep palpation   EXTREMITIES:  no edema           LABS:                        8.8    15.9  )-----------( 246      ( 19 May 2018 05:55 )             28.3     05-19    141  |  105  |  61.0<H>  ----------------------------<  149<H>  3.8   |  19.0<L>  |  2.52<H>    Ca    8.0<L>      19 May 2018 05:55  Mg     1.8     05-19              MEDICATIONS  (STANDING):  amLODIPine   Tablet 10 milliGRAM(s) Oral daily  ampicillin/sulbactam  IVPB      ampicillin/sulbactam  IVPB 1.5 Gram(s) IV Intermittent every 6 hours  aspirin  chewable 81 milliGRAM(s) Oral daily  busPIRone 10 milliGRAM(s) Oral two times a day  dextrose 5%. 1000 milliLiter(s) (75 mL/Hr) IV Continuous <Continuous>  enoxaparin Injectable 40 milliGRAM(s) SubCutaneous daily  fentaNYL   Patch  12 MICROgram(s)/Hr 1 Patch Transdermal every 72 hours  latanoprost 0.005% Ophthalmic Solution 1 Drop(s) Both EYES at bedtime  lidocaine   Patch 1 Patch Transdermal daily  memantine 5 milliGRAM(s) Oral daily  risperiDONE  Disintegrating Tablet 0.25 milliGRAM(s) Oral two times a day  saccharomyces boulardii 250 milliGRAM(s) Oral two times a day  senna 2 Tablet(s) Oral at bedtime  sertraline 100 milliGRAM(s) Oral daily  simvastatin 40 milliGRAM(s) Oral at bedtime    MEDICATIONS  (PRN):  bisacodyl Suppository 10 milliGRAM(s) Rectal daily PRN Constipation  HYDROmorphone  Injectable 0.5 milliGRAM(s) IV Push every 3 hours PRN severe/breakthrough pain      RADIOLOGY & ADDITIONAL TESTS:

## 2018-05-20 LAB
ANION GAP SERPL CALC-SCNC: 15 MMOL/L — SIGNIFICANT CHANGE UP (ref 5–17)
BUN SERPL-MCNC: 55 MG/DL — HIGH (ref 8–20)
CALCIUM SERPL-MCNC: 7.9 MG/DL — LOW (ref 8.6–10.2)
CHLORIDE SERPL-SCNC: 104 MMOL/L — SIGNIFICANT CHANGE UP (ref 98–107)
CO2 SERPL-SCNC: 19 MMOL/L — LOW (ref 22–29)
CREAT SERPL-MCNC: 2.2 MG/DL — HIGH (ref 0.5–1.3)
GLUCOSE SERPL-MCNC: 144 MG/DL — HIGH (ref 70–115)
HCT VFR BLD CALC: 25.2 % — LOW (ref 37–47)
HGB BLD-MCNC: 7.9 G/DL — LOW (ref 12–16)
MAGNESIUM SERPL-MCNC: 1.6 MG/DL — LOW (ref 1.8–2.6)
MCHC RBC-ENTMCNC: 27.3 PG — SIGNIFICANT CHANGE UP (ref 27–31)
MCHC RBC-ENTMCNC: 31.3 G/DL — LOW (ref 32–36)
MCV RBC AUTO: 87.2 FL — SIGNIFICANT CHANGE UP (ref 81–99)
PLATELET # BLD AUTO: 178 K/UL — SIGNIFICANT CHANGE UP (ref 150–400)
POTASSIUM SERPL-MCNC: 3.8 MMOL/L — SIGNIFICANT CHANGE UP (ref 3.5–5.3)
POTASSIUM SERPL-SCNC: 3.8 MMOL/L — SIGNIFICANT CHANGE UP (ref 3.5–5.3)
RBC # BLD: 2.89 M/UL — LOW (ref 4.4–5.2)
RBC # FLD: 14 % — SIGNIFICANT CHANGE UP (ref 11–15.6)
SODIUM SERPL-SCNC: 138 MMOL/L — SIGNIFICANT CHANGE UP (ref 135–145)
WBC # BLD: 12.6 K/UL — HIGH (ref 4.8–10.8)
WBC # FLD AUTO: 12.6 K/UL — HIGH (ref 4.8–10.8)

## 2018-05-20 PROCEDURE — 99232 SBSQ HOSP IP/OBS MODERATE 35: CPT

## 2018-05-20 RX ORDER — MAGNESIUM SULFATE 500 MG/ML
2 VIAL (ML) INJECTION ONCE
Qty: 0 | Refills: 0 | Status: COMPLETED | OUTPATIENT
Start: 2018-05-20 | End: 2018-05-20

## 2018-05-20 RX ORDER — CALCIUM GLUCONATE 100 MG/ML
1 VIAL (ML) INTRAVENOUS ONCE
Qty: 0 | Refills: 0 | Status: COMPLETED | OUTPATIENT
Start: 2018-05-20 | End: 2018-05-20

## 2018-05-20 RX ADMIN — Medication 200 GRAM(S): at 13:44

## 2018-05-20 RX ADMIN — AMPICILLIN SODIUM AND SULBACTAM SODIUM 100 GRAM(S): 250; 125 INJECTION, POWDER, FOR SUSPENSION INTRAMUSCULAR; INTRAVENOUS at 11:14

## 2018-05-20 RX ADMIN — HYDROMORPHONE HYDROCHLORIDE 0.5 MILLIGRAM(S): 2 INJECTION INTRAMUSCULAR; INTRAVENOUS; SUBCUTANEOUS at 23:55

## 2018-05-20 RX ADMIN — AMPICILLIN SODIUM AND SULBACTAM SODIUM 100 GRAM(S): 250; 125 INJECTION, POWDER, FOR SUSPENSION INTRAMUSCULAR; INTRAVENOUS at 06:17

## 2018-05-20 RX ADMIN — Medication 81 MILLIGRAM(S): at 11:14

## 2018-05-20 RX ADMIN — HYDROMORPHONE HYDROCHLORIDE 0.5 MILLIGRAM(S): 2 INJECTION INTRAMUSCULAR; INTRAVENOUS; SUBCUTANEOUS at 01:36

## 2018-05-20 RX ADMIN — AMPICILLIN SODIUM AND SULBACTAM SODIUM 100 GRAM(S): 250; 125 INJECTION, POWDER, FOR SUSPENSION INTRAMUSCULAR; INTRAVENOUS at 17:14

## 2018-05-20 RX ADMIN — SODIUM CHLORIDE 75 MILLILITER(S): 9 INJECTION, SOLUTION INTRAVENOUS at 21:55

## 2018-05-20 RX ADMIN — SIMVASTATIN 40 MILLIGRAM(S): 20 TABLET, FILM COATED ORAL at 21:55

## 2018-05-20 RX ADMIN — AMPICILLIN SODIUM AND SULBACTAM SODIUM 100 GRAM(S): 250; 125 INJECTION, POWDER, FOR SUSPENSION INTRAMUSCULAR; INTRAVENOUS at 23:37

## 2018-05-20 RX ADMIN — Medication 50 GRAM(S): at 10:39

## 2018-05-20 RX ADMIN — SERTRALINE 100 MILLIGRAM(S): 25 TABLET, FILM COATED ORAL at 11:14

## 2018-05-20 RX ADMIN — HYDROMORPHONE HYDROCHLORIDE 0.5 MILLIGRAM(S): 2 INJECTION INTRAMUSCULAR; INTRAVENOUS; SUBCUTANEOUS at 23:32

## 2018-05-20 RX ADMIN — RISPERIDONE 0.25 MILLIGRAM(S): 4 TABLET ORAL at 17:14

## 2018-05-20 RX ADMIN — ENOXAPARIN SODIUM 40 MILLIGRAM(S): 100 INJECTION SUBCUTANEOUS at 11:14

## 2018-05-20 RX ADMIN — LIDOCAINE 1 PATCH: 4 CREAM TOPICAL at 11:14

## 2018-05-20 RX ADMIN — Medication 250 MILLIGRAM(S): at 17:14

## 2018-05-20 RX ADMIN — MEMANTINE HYDROCHLORIDE 5 MILLIGRAM(S): 10 TABLET ORAL at 11:14

## 2018-05-20 RX ADMIN — SENNA PLUS 2 TABLET(S): 8.6 TABLET ORAL at 21:55

## 2018-05-20 RX ADMIN — LIDOCAINE 1 PATCH: 4 CREAM TOPICAL at 23:32

## 2018-05-20 RX ADMIN — HYDROMORPHONE HYDROCHLORIDE 0.5 MILLIGRAM(S): 2 INJECTION INTRAMUSCULAR; INTRAVENOUS; SUBCUTANEOUS at 01:21

## 2018-05-20 RX ADMIN — LATANOPROST 1 DROP(S): 0.05 SOLUTION/ DROPS OPHTHALMIC; TOPICAL at 21:55

## 2018-05-20 RX ADMIN — Medication 10 MILLIGRAM(S): at 17:14

## 2018-05-20 NOTE — PROGRESS NOTE ADULT - SUBJECTIVE AND OBJECTIVE BOX
KYLE SCHULTZ    [id number]    89y      Female    INTERVAL HPI/OVERNIGHT EVENTS:      89 yo F with h/o dementia, CAD presents from assisted living facility s/p fall. In the ED, found to have right comminuted femoral neck fracture. On 5/11, had partial hemiarthroplasty. On 5/14, Hgb dropped to 7.1. Received 1u PRBC. Speech therapy found pt with pharyngeal dysphagia. As per both HCP, Ted and Jordi Schultz, they are concerned for her comfort and refuse feeding tubes at this time. They understand risks of aspiration, pneumonia, and death. She is to have comfort feeds. DNR/DNI     patient seen at bedside and is more awake and responsive, patient able to answer questions and states that she wants to go home      REVIEW OF SYSTEMS:    CONSTITUTIONAL:  fatigue  RESPIRATORY: No cough, wheezing, hemoptysis; No shortness of breath  CARDIOVASCULAR: No chest pain, palpitations  GASTROINTESTINAL: No abdominal or epigastric pain. No nausea, vomiting  NEUROLOGICAL: No headaches, memory loss, loss of strength.  MISCELLANEOUS:      Vital Signs Last 24 Hrs  T(C): 36.3 (20 May 2018 04:51), Max: 37.1 (19 May 2018 17:31)  T(F): 97.3 (20 May 2018 04:51), Max: 98.8 (19 May 2018 17:31)  HR: 76 (20 May 2018 04:51) (76 - 89)  BP: 139/59 (20 May 2018 04:51) (118/58 - 139/59)  BP(mean): --  RR: 16 (20 May 2018 04:51) (16 - 16)  SpO2: 90% (20 May 2018 04:51) (90% - 96%)    PHYSICAL EXAM:    GENERAL: NAD, awake   HEENT: PERRL, +EOMI, MMM  CHEST/LUNG: Clear to percussion bilaterally; No wheezing  HEART: S1S2+, Regular rate and rhythm   ABDOMEN: Soft, tender on deep palpation   EXTREMITIES:  no edema   NEURO: aaox 1-2, follows commands        LABS:                        7.9    12.6  )-----------( 178      ( 20 May 2018 05:48 )             25.2     05-20    138  |  104  |  55.0<H>  ----------------------------<  144<H>  3.8   |  19.0<L>  |  2.20<H>    Ca    7.9<L>      20 May 2018 05:45  Mg     1.6     05-20      MEDICATIONS  (STANDING):  amLODIPine   Tablet 10 milliGRAM(s) Oral daily  ampicillin/sulbactam  IVPB      ampicillin/sulbactam  IVPB 1.5 Gram(s) IV Intermittent every 6 hours  aspirin  chewable 81 milliGRAM(s) Oral daily  busPIRone 10 milliGRAM(s) Oral two times a day  calcium gluconate IVPB 1 Gram(s) IV Intermittent once  dextrose 5%. 1000 milliLiter(s) (75 mL/Hr) IV Continuous <Continuous>  enoxaparin Injectable 40 milliGRAM(s) SubCutaneous daily  fentaNYL   Patch  12 MICROgram(s)/Hr 1 Patch Transdermal every 72 hours  latanoprost 0.005% Ophthalmic Solution 1 Drop(s) Both EYES at bedtime  lidocaine   Patch 1 Patch Transdermal daily  memantine 5 milliGRAM(s) Oral daily  risperiDONE  Disintegrating Tablet 0.25 milliGRAM(s) Oral two times a day  saccharomyces boulardii 250 milliGRAM(s) Oral two times a day  senna 2 Tablet(s) Oral at bedtime  sertraline 100 milliGRAM(s) Oral daily  simvastatin 40 milliGRAM(s) Oral at bedtime    MEDICATIONS  (PRN):  bisacodyl Suppository 10 milliGRAM(s) Rectal daily PRN Constipation  HYDROmorphone  Injectable 0.5 milliGRAM(s) IV Push every 3 hours PRN severe/breakthrough pain      RADIOLOGY & ADDITIONAL TESTS:

## 2018-05-20 NOTE — PROGRESS NOTE ADULT - ASSESSMENT
89 yo F with h/o dementia, CAD here with right hip fracture.      Problem/Plan - 1:  ·  Problem: Anemia due to blood loss.  Plan: Postoperative - acute  stable  S/p 1u PRBC      Problem/Plan - 2:  ·  Problem: Aspiration pneumonia of right lower lobe, unspecified aspiration pneumonia type.  Plan: 2/2 dysphagia  c/w unasyn  --> last day of abx today      Problem/Plan - 3:  ·  Problem: Pharyngeal dysphagia.  -->  --> continue current care     Problem/Plan - 4:  ·  Problem: Closed fracture of right hip, initial encounter.  Plan: s/p partial hemiarthroplasty  PT eval noted - rehab  c/w pain control.      Problem/Plan - 5:  ·  Problem: Dementia with behavioral disturbance, unspecified dementia type.  Plan: supportive care  c/w buspirone and sertraline.      Problem/Plan - 6:  Problem: Essential hypertension. Plan: normotensive  c/w amlodipine.     Problem/Plan - 7:  ·  Problem: Coronary artery disease involving native coronary artery of native heart without angina pectoris.  Plan: c/w asa and statin.      Problem/Plan - 8:  ·  Problem: Glaucoma.  Plan: home meds.      Problem/Plan - 9:  ·  Problem: hypernatremia -->imrpvoed    10)  NIA --> improved today  --> c.w fluids     palliative following  --> home with hospice vs inpatient hospice    spoke to son henry with updates    dispo --> likely dc tomorrow

## 2018-05-21 LAB
ANION GAP SERPL CALC-SCNC: 15 MMOL/L — SIGNIFICANT CHANGE UP (ref 5–17)
BUN SERPL-MCNC: 41 MG/DL — HIGH (ref 8–20)
CALCIUM SERPL-MCNC: 8.3 MG/DL — LOW (ref 8.6–10.2)
CHLORIDE SERPL-SCNC: 101 MMOL/L — SIGNIFICANT CHANGE UP (ref 98–107)
CO2 SERPL-SCNC: 20 MMOL/L — LOW (ref 22–29)
CREAT SERPL-MCNC: 1.75 MG/DL — HIGH (ref 0.5–1.3)
GLUCOSE SERPL-MCNC: 138 MG/DL — HIGH (ref 70–115)
POTASSIUM SERPL-MCNC: 3.3 MMOL/L — LOW (ref 3.5–5.3)
POTASSIUM SERPL-SCNC: 3.3 MMOL/L — LOW (ref 3.5–5.3)
SODIUM SERPL-SCNC: 136 MMOL/L — SIGNIFICANT CHANGE UP (ref 135–145)
SURGICAL PATHOLOGY FINAL REPORT - CH: SIGNIFICANT CHANGE UP

## 2018-05-21 PROCEDURE — 99232 SBSQ HOSP IP/OBS MODERATE 35: CPT

## 2018-05-21 RX ORDER — MEMANTINE HYDROCHLORIDE 10 MG/1
1 TABLET ORAL
Qty: 0 | Refills: 0 | COMMUNITY
Start: 2018-05-21

## 2018-05-21 RX ORDER — HALOPERIDOL DECANOATE 100 MG/ML
1 INJECTION INTRAMUSCULAR EVERY 8 HOURS
Qty: 0 | Refills: 0 | Status: DISCONTINUED | OUTPATIENT
Start: 2018-05-21 | End: 2018-05-22

## 2018-05-21 RX ORDER — FENTANYL CITRATE 50 UG/ML
1 INJECTION INTRAVENOUS
Qty: 0 | Refills: 0 | COMMUNITY
Start: 2018-05-21

## 2018-05-21 RX ORDER — HYDROMORPHONE HYDROCHLORIDE 2 MG/ML
2 INJECTION INTRAMUSCULAR; INTRAVENOUS; SUBCUTANEOUS EVERY 6 HOURS
Qty: 0 | Refills: 0 | Status: DISCONTINUED | OUTPATIENT
Start: 2018-05-21 | End: 2018-05-22

## 2018-05-21 RX ORDER — LIDOCAINE 4 G/100G
1 CREAM TOPICAL
Qty: 0 | Refills: 0 | COMMUNITY
Start: 2018-05-21

## 2018-05-21 RX ORDER — QUETIAPINE FUMARATE 200 MG/1
25 TABLET, FILM COATED ORAL AT BEDTIME
Qty: 0 | Refills: 0 | Status: DISCONTINUED | OUTPATIENT
Start: 2018-05-21 | End: 2018-05-22

## 2018-05-21 RX ORDER — METOPROLOL TARTRATE 50 MG
12.5 TABLET ORAL
Qty: 0 | Refills: 0 | Status: DISCONTINUED | OUTPATIENT
Start: 2018-05-21 | End: 2018-05-22

## 2018-05-21 RX ORDER — MEMANTINE HYDROCHLORIDE 10 MG/1
1 TABLET ORAL
Qty: 0 | Refills: 0 | COMMUNITY

## 2018-05-21 RX ORDER — ASPIRIN/CALCIUM CARB/MAGNESIUM 324 MG
1 TABLET ORAL
Qty: 0 | Refills: 0 | COMMUNITY

## 2018-05-21 RX ORDER — SERTRALINE 25 MG/1
1 TABLET, FILM COATED ORAL
Qty: 0 | Refills: 0 | COMMUNITY

## 2018-05-21 RX ORDER — METOPROLOL TARTRATE 50 MG
0.5 TABLET ORAL
Qty: 30 | Refills: 0 | OUTPATIENT
Start: 2018-05-21 | End: 2018-06-19

## 2018-05-21 RX ORDER — SIMVASTATIN 20 MG/1
1 TABLET, FILM COATED ORAL
Qty: 0 | Refills: 0 | COMMUNITY
Start: 2018-05-21

## 2018-05-21 RX ORDER — HYDRALAZINE HCL 50 MG
5 TABLET ORAL ONCE
Qty: 0 | Refills: 0 | Status: COMPLETED | OUTPATIENT
Start: 2018-05-21 | End: 2018-05-21

## 2018-05-21 RX ORDER — SERTRALINE 25 MG/1
1.5 TABLET, FILM COATED ORAL
Qty: 0 | Refills: 0 | COMMUNITY
Start: 2018-05-21

## 2018-05-21 RX ORDER — POTASSIUM CHLORIDE 20 MEQ
40 PACKET (EA) ORAL ONCE
Qty: 0 | Refills: 0 | Status: COMPLETED | OUTPATIENT
Start: 2018-05-21 | End: 2018-05-21

## 2018-05-21 RX ORDER — SIMVASTATIN 20 MG/1
1 TABLET, FILM COATED ORAL
Qty: 0 | Refills: 0 | COMMUNITY

## 2018-05-21 RX ORDER — AMLODIPINE BESYLATE 2.5 MG/1
1 TABLET ORAL
Qty: 0 | Refills: 0 | COMMUNITY
Start: 2018-05-21

## 2018-05-21 RX ORDER — SERTRALINE 25 MG/1
1 TABLET, FILM COATED ORAL
Qty: 0 | Refills: 0 | COMMUNITY
Start: 2018-05-21

## 2018-05-21 RX ORDER — MINERAL OIL
133 OIL (ML) MISCELLANEOUS ONCE
Qty: 0 | Refills: 0 | Status: COMPLETED | OUTPATIENT
Start: 2018-05-21 | End: 2018-05-21

## 2018-05-21 RX ORDER — RISPERIDONE 4 MG/1
1 TABLET ORAL
Qty: 0 | Refills: 0 | COMMUNITY
Start: 2018-05-21

## 2018-05-21 RX ORDER — ASPIRIN/CALCIUM CARB/MAGNESIUM 324 MG
1 TABLET ORAL
Qty: 0 | Refills: 0 | COMMUNITY
Start: 2018-05-21

## 2018-05-21 RX ORDER — HYDROMORPHONE HYDROCHLORIDE 2 MG/ML
2 INJECTION INTRAMUSCULAR; INTRAVENOUS; SUBCUTANEOUS EVERY 4 HOURS
Qty: 0 | Refills: 0 | Status: DISCONTINUED | OUTPATIENT
Start: 2018-05-21 | End: 2018-05-22

## 2018-05-21 RX ORDER — ACETAMINOPHEN 500 MG
650 TABLET ORAL EVERY 6 HOURS
Qty: 0 | Refills: 0 | Status: DISCONTINUED | OUTPATIENT
Start: 2018-05-21 | End: 2018-05-22

## 2018-05-21 RX ADMIN — HYDROMORPHONE HYDROCHLORIDE 0.5 MILLIGRAM(S): 2 INJECTION INTRAMUSCULAR; INTRAVENOUS; SUBCUTANEOUS at 08:02

## 2018-05-21 RX ADMIN — Medication 5 MILLIGRAM(S): at 06:08

## 2018-05-21 RX ADMIN — AMPICILLIN SODIUM AND SULBACTAM SODIUM 100 GRAM(S): 250; 125 INJECTION, POWDER, FOR SUSPENSION INTRAMUSCULAR; INTRAVENOUS at 05:22

## 2018-05-21 RX ADMIN — FENTANYL CITRATE 1 PATCH: 50 INJECTION INTRAVENOUS at 13:21

## 2018-05-21 RX ADMIN — SIMVASTATIN 40 MILLIGRAM(S): 20 TABLET, FILM COATED ORAL at 21:31

## 2018-05-21 RX ADMIN — Medication 650 MILLIGRAM(S): at 17:30

## 2018-05-21 RX ADMIN — RISPERIDONE 0.25 MILLIGRAM(S): 4 TABLET ORAL at 17:00

## 2018-05-21 RX ADMIN — Medication 250 MILLIGRAM(S): at 05:22

## 2018-05-21 RX ADMIN — Medication 10 MILLIGRAM(S): at 21:31

## 2018-05-21 RX ADMIN — Medication 10 MILLIGRAM(S): at 05:22

## 2018-05-21 RX ADMIN — SENNA PLUS 2 TABLET(S): 8.6 TABLET ORAL at 21:31

## 2018-05-21 RX ADMIN — Medication 12.5 MILLIGRAM(S): at 17:01

## 2018-05-21 RX ADMIN — Medication 81 MILLIGRAM(S): at 11:14

## 2018-05-21 RX ADMIN — HYDROMORPHONE HYDROCHLORIDE 0.5 MILLIGRAM(S): 2 INJECTION INTRAMUSCULAR; INTRAVENOUS; SUBCUTANEOUS at 09:11

## 2018-05-21 RX ADMIN — SERTRALINE 100 MILLIGRAM(S): 25 TABLET, FILM COATED ORAL at 11:15

## 2018-05-21 RX ADMIN — HALOPERIDOL DECANOATE 1 MILLIGRAM(S): 100 INJECTION INTRAMUSCULAR at 13:20

## 2018-05-21 RX ADMIN — HYDROMORPHONE HYDROCHLORIDE 2 MILLIGRAM(S): 2 INJECTION INTRAMUSCULAR; INTRAVENOUS; SUBCUTANEOUS at 17:30

## 2018-05-21 RX ADMIN — AMLODIPINE BESYLATE 10 MILLIGRAM(S): 2.5 TABLET ORAL at 05:23

## 2018-05-21 RX ADMIN — FENTANYL CITRATE 1 PATCH: 50 INJECTION INTRAVENOUS at 13:16

## 2018-05-21 RX ADMIN — HYDROMORPHONE HYDROCHLORIDE 2 MILLIGRAM(S): 2 INJECTION INTRAMUSCULAR; INTRAVENOUS; SUBCUTANEOUS at 17:01

## 2018-05-21 RX ADMIN — QUETIAPINE FUMARATE 25 MILLIGRAM(S): 200 TABLET, FILM COATED ORAL at 21:32

## 2018-05-21 RX ADMIN — Medication 650 MILLIGRAM(S): at 17:00

## 2018-05-21 RX ADMIN — HYDROMORPHONE HYDROCHLORIDE 2 MILLIGRAM(S): 2 INJECTION INTRAMUSCULAR; INTRAVENOUS; SUBCUTANEOUS at 22:15

## 2018-05-21 RX ADMIN — LIDOCAINE 1 PATCH: 4 CREAM TOPICAL at 11:15

## 2018-05-21 RX ADMIN — HYDROMORPHONE HYDROCHLORIDE 2 MILLIGRAM(S): 2 INJECTION INTRAMUSCULAR; INTRAVENOUS; SUBCUTANEOUS at 21:34

## 2018-05-21 RX ADMIN — Medication 40 MILLIEQUIVALENT(S): at 13:20

## 2018-05-21 RX ADMIN — RISPERIDONE 0.25 MILLIGRAM(S): 4 TABLET ORAL at 05:22

## 2018-05-21 RX ADMIN — MEMANTINE HYDROCHLORIDE 5 MILLIGRAM(S): 10 TABLET ORAL at 11:14

## 2018-05-21 RX ADMIN — Medication 250 MILLIGRAM(S): at 17:01

## 2018-05-21 RX ADMIN — Medication 10 MILLIGRAM(S): at 17:01

## 2018-05-21 RX ADMIN — LATANOPROST 1 DROP(S): 0.05 SOLUTION/ DROPS OPHTHALMIC; TOPICAL at 21:31

## 2018-05-21 RX ADMIN — ENOXAPARIN SODIUM 40 MILLIGRAM(S): 100 INJECTION SUBCUTANEOUS at 11:14

## 2018-05-21 NOTE — PROGRESS NOTE ADULT - ASSESSMENT
89 yo F frail debilitated S/P Fall    R Femur Fx S/P RTHR    Pain exacerbation- due to increasing restlessness and agitation  Will increase Fentanyl patch to 25mg/hr  Dilaudid PO ATC.  Tylenol ATC    Anxiety/Agitation  Added Haldol 1mg IM today, good effect  Risperdal 0.5mg Q12    Delirium with Underlying dementia   Crush meds- chronic psychotropics  Treat pain aggressively poor self advocate  Plan will need to be changed home with hospice or [facility] with Planning LTP    Constipation   adding dulcolax suppository today follow with fleet enema if no BM

## 2018-05-21 NOTE — PROGRESS NOTE ADULT - NSHPATTENDINGPLANDISCUSS_GEN_ALL_CORE
Dr. Templeton about >agitation and restlessness -plan for [facility] not realistic treating pain more aggressively
MIREYA Castellon
RN, orthopedics

## 2018-05-21 NOTE — PROGRESS NOTE ADULT - SUBJECTIVE AND OBJECTIVE BOX
INTERVAL HPI/OVERNIGHT EVENTS: 89yo Female Patient S/P fall and RTHR surgical repair. PMH of Dementia, HTN, CAD DM.  Patient has had hyperactive Delirium, then settled down with more aggressive pain management. Today she has become more agitated and restless.   Struggling to untie the abduction pillow between her legs, causing more pain  and agitation.  Private  at bedside- whom she does not recognize. She is more alert, able to feed without choking     CC:  Verbally C/O "My leg hurts" abdomen more distended- ?constipation    89y old  Female who presents with a chief complaint of fall (14 May 2018 07:19)    PAST MEDICAL & SURGICAL HISTORY:  Dementia  Coronary artery disease involving native coronary artery of native heart without angina pectoris  High cholesterol  HTN (hypertension)  DM (diabetes mellitus)  S/P cataract extraction and insertion of intraocular lens, right  S/P cataract extraction and insertion of intraocular lens, left    Present Symptoms:     Dyspnea: 0 1  Nausea/Vomiting: No  Anxiety:  Yes   Depression: Yes   Fatigue: Yes   Loss of appetite: Yes must be fed    Pain: R lower extremity and hip            Character-            Duration-            Effect-            Factors-            Frequency-            Location-            Severity-moderate to severe    Review of Systems: Reviewed                       Unable to obtain due to poor mentation       MEDICATIONS  (STANDING):  acetaminophen   Tablet. 650 milliGRAM(s) Oral every 6 hours  amLODIPine   Tablet 10 milliGRAM(s) Oral daily  aspirin  chewable 81 milliGRAM(s) Oral daily  busPIRone 10 milliGRAM(s) Oral two times a day  dextrose 5%. 1000 milliLiter(s) (75 mL/Hr) IV Continuous <Continuous>  enoxaparin Injectable 40 milliGRAM(s) SubCutaneous daily  HYDROmorphone   Tablet 2 milliGRAM(s) Oral every 6 hours  latanoprost 0.005% Ophthalmic Solution 1 Drop(s) Both EYES at bedtime  lidocaine   Patch 1 Patch Transdermal daily  memantine 5 milliGRAM(s) Oral daily  metoprolol tartrate 12.5 milliGRAM(s) Oral two times a day  risperiDONE  Disintegrating Tablet 0.25 milliGRAM(s) Oral two times a day  saccharomyces boulardii 250 milliGRAM(s) Oral two times a day  senna 2 Tablet(s) Oral at bedtime  sertraline 100 milliGRAM(s) Oral daily  simvastatin 40 milliGRAM(s) Oral at bedtime    MEDICATIONS  (PRN):  bisacodyl Suppository 10 milliGRAM(s) Rectal daily PRN Constipation  haloperidol    Injectable 1 milliGRAM(s) IntraMuscular every 8 hours PRN persistent agitation  HYDROmorphone   Tablet 2 milliGRAM(s) Oral every 4 hours PRN Severe Pain (7 - 10)  HYDROmorphone  Injectable 0.5 milliGRAM(s) IV Push every 3 hours PRN severe/breakthrough pain      PHYSICAL EXAM:    Vital Signs Last 24 Hrs  T(C): 36.6 (21 May 2018 13:23), Max: 37.1 (20 May 2018 17:06)  T(F): 97.8 (21 May 2018 13:23), Max: 98.7 (20 May 2018 17:06)  HR: 74 (21 May 2018 13:23) (74 - 100)  BP: 144/55 (21 May 2018 13:23) (140/64 - 194/77)  BP(mean): --  RR: 18 (21 May 2018 13:23) (16 - 18)  SpO2: 94% (20 May 2018 19:40) (94% - 94%)    General: alert  oriented x 1__  agitated                  cachexia    HEENT:   dry mouth    Lungs: comfortable    CV: normal      GI:distended  tender                 constipation: Maybe last BM unknown    :  incontinent     MSK: weakness              bedboundunable to get patient OOB- not following commands-confused and uncomfortable in pain  Skin: normal  no rash    LABS:                        7.9    12.6  )-----------( 178      ( 20 May 2018 05:48 )             25.2     05-21    136  |  101  |  41.0<H>  ----------------------------<  138<H>  3.3<L>   |  20.0<L>  |  1.75<H>    Ca    8.3<L>      21 May 2018 07:33  Mg     1.6     05-20          I&O's Summary    20 May 2018 07:01  -  21 May 2018 07:00  --------------------------------------------------------  IN: 900 mL / OUT: 0 mL / NET: 900 mL    21 May 2018 07:01  -  21 May 2018 15:11  --------------------------------------------------------  IN: 480 mL / OUT: 0 mL / NET: 480 mL        RADIOLOGY & ADDITIONAL STUDIES:    ADVANCE DIRECTIVES:   DNR YES   Completed on:                     ROGERIO  YES    Completed on:  Living Will   NO   Completed on:

## 2018-05-21 NOTE — PROGRESS NOTE ADULT - PROVIDER SPECIALTY LIST ADULT
Anesthesia
Hospitalist
Internal Medicine
Orthopedics
Palliative Care
Palliative Care
Internal Medicine
Palliative Care
Hospitalist
Hospitalist

## 2018-05-21 NOTE — GOALS OF CARE CONVERSATION - PERSONAL ADVANCE DIRECTIVE - CONVERSATION DETAILS
Pt  appears to  have improved less lethargic still confused but verbally responsive oriented x 1 only  pt agitated by Hip pillow would like removed despite many attempts to redirect and explain the necessity pt unable to accept . Pt is  eating  today taking po medications  although agitated by situation she does not appear to be inpt hospice appropriate  today  there are also no available beds and it is unlikely  that pt could return to her home setting which is an assisted living  as she has not been oob , Discussed above with mandy Napoles and  palliative np nickie jacobo pt could use titration of medication for  agitation confusion and possible transition of prn Dilaudid to po as all other meds are administered orally . Unclear if pt would follow direction and / or participate with PT but eval might assist in plan development

## 2018-05-21 NOTE — PROGRESS NOTE ADULT - ATTENDING COMMENTS
COUNSELING:    Face to face meeting to discuss Advanced Care Planning - Time Spent ______ Minutes.  See goals of care note.    More than 50% time spent in counseling and coordinating care. ___30___ Minutes.     Thank you for the opportunity to assist with the care of this patient.   [place] [phone].
COUNSELING:    Face to face meeting to discuss Advanced Care Planning - Time Spent ______ Minutes.  See goals of care note.    More than 50% time spent in counseling and coordinating care. 30____ Minutes.     Thank you for the opportunity to assist with the care of this patient.   [place] [facility] [phone].
COUNSELING:    Face to face meeting to discuss Advanced Care Planning - Time Spent ______ Minutes.  See goals of care note.    More than 50% time spent in counseling and coordinating care. __30____ Minutes.     Thank you for the opportunity to assist with the care of this patient.   [place] [phone].
Dispo: Rehab. Had long discussion with both Ted and Jordi today regarding pt's deteriorating status. They both state that they understand that their mother may not return to her mental state prior to admission. I explained that she did develop aspiration pneumonia while being placed on comfort feeds. They would like for patient to be medically optimized as best as possible prior to discharge, as pt was reportedly functional prior to admission.
Dispo: Rehab. Palliative consult pending
Dispo: Had goals of care discussion with both HCP, Ted and Jordi Schultz regarding advanced directives. Per patient's wishes, she is DNR/DNI. They are concerned for her quality of life and would like for her to be comfortable. 30minutes discussed.

## 2018-05-22 VITALS
HEART RATE: 88 BPM | TEMPERATURE: 98 F | RESPIRATION RATE: 18 BRPM | SYSTOLIC BLOOD PRESSURE: 131 MMHG | DIASTOLIC BLOOD PRESSURE: 56 MMHG

## 2018-05-22 LAB
ANION GAP SERPL CALC-SCNC: 17 MMOL/L — SIGNIFICANT CHANGE UP (ref 5–17)
BLD GP AB SCN SERPL QL: SIGNIFICANT CHANGE UP
BUN SERPL-MCNC: 32 MG/DL — HIGH (ref 8–20)
CALCIUM SERPL-MCNC: 8.3 MG/DL — LOW (ref 8.6–10.2)
CHLORIDE SERPL-SCNC: 101 MMOL/L — SIGNIFICANT CHANGE UP (ref 98–107)
CO2 SERPL-SCNC: 20 MMOL/L — LOW (ref 22–29)
CREAT SERPL-MCNC: 1.53 MG/DL — HIGH (ref 0.5–1.3)
GLUCOSE SERPL-MCNC: 113 MG/DL — SIGNIFICANT CHANGE UP (ref 70–115)
HCT VFR BLD CALC: 26.9 % — LOW (ref 37–47)
HGB BLD-MCNC: 8.7 G/DL — LOW (ref 12–16)
MCHC RBC-ENTMCNC: 27.7 PG — SIGNIFICANT CHANGE UP (ref 27–31)
MCHC RBC-ENTMCNC: 32.3 G/DL — SIGNIFICANT CHANGE UP (ref 32–36)
MCV RBC AUTO: 85.7 FL — SIGNIFICANT CHANGE UP (ref 81–99)
PLATELET # BLD AUTO: 279 K/UL — SIGNIFICANT CHANGE UP (ref 150–400)
POTASSIUM SERPL-MCNC: 3.5 MMOL/L — SIGNIFICANT CHANGE UP (ref 3.5–5.3)
POTASSIUM SERPL-SCNC: 3.5 MMOL/L — SIGNIFICANT CHANGE UP (ref 3.5–5.3)
RBC # BLD: 3.14 M/UL — LOW (ref 4.4–5.2)
RBC # FLD: 13.1 % — SIGNIFICANT CHANGE UP (ref 11–15.6)
SODIUM SERPL-SCNC: 138 MMOL/L — SIGNIFICANT CHANGE UP (ref 135–145)
WBC # BLD: 16.1 K/UL — HIGH (ref 4.8–10.8)
WBC # FLD AUTO: 16.1 K/UL — HIGH (ref 4.8–10.8)

## 2018-05-22 PROCEDURE — 72125 CT NECK SPINE W/O DYE: CPT

## 2018-05-22 PROCEDURE — 80048 BASIC METABOLIC PNL TOTAL CA: CPT

## 2018-05-22 PROCEDURE — 96374 THER/PROPH/DIAG INJ IV PUSH: CPT

## 2018-05-22 PROCEDURE — 73501 X-RAY EXAM HIP UNI 1 VIEW: CPT

## 2018-05-22 PROCEDURE — 85027 COMPLETE CBC AUTOMATED: CPT

## 2018-05-22 PROCEDURE — 96375 TX/PRO/DX INJ NEW DRUG ADDON: CPT

## 2018-05-22 PROCEDURE — 82962 GLUCOSE BLOOD TEST: CPT

## 2018-05-22 PROCEDURE — 88311 DECALCIFY TISSUE: CPT

## 2018-05-22 PROCEDURE — 97163 PT EVAL HIGH COMPLEX 45 MIN: CPT

## 2018-05-22 PROCEDURE — 84484 ASSAY OF TROPONIN QUANT: CPT

## 2018-05-22 PROCEDURE — 86850 RBC ANTIBODY SCREEN: CPT

## 2018-05-22 PROCEDURE — 93306 TTE W/DOPPLER COMPLETE: CPT

## 2018-05-22 PROCEDURE — 36430 TRANSFUSION BLD/BLD COMPNT: CPT

## 2018-05-22 PROCEDURE — 36415 COLL VENOUS BLD VENIPUNCTURE: CPT

## 2018-05-22 PROCEDURE — 99239 HOSP IP/OBS DSCHRG MGMT >30: CPT

## 2018-05-22 PROCEDURE — 97530 THERAPEUTIC ACTIVITIES: CPT

## 2018-05-22 PROCEDURE — 81001 URINALYSIS AUTO W/SCOPE: CPT

## 2018-05-22 PROCEDURE — 73502 X-RAY EXAM HIP UNI 2-3 VIEWS: CPT

## 2018-05-22 PROCEDURE — 71045 X-RAY EXAM CHEST 1 VIEW: CPT | Mod: 26

## 2018-05-22 PROCEDURE — 85610 PROTHROMBIN TIME: CPT

## 2018-05-22 PROCEDURE — 83735 ASSAY OF MAGNESIUM: CPT

## 2018-05-22 PROCEDURE — 83036 HEMOGLOBIN GLYCOSYLATED A1C: CPT

## 2018-05-22 PROCEDURE — 71045 X-RAY EXAM CHEST 1 VIEW: CPT

## 2018-05-22 PROCEDURE — 70450 CT HEAD/BRAIN W/O DYE: CPT

## 2018-05-22 PROCEDURE — 88305 TISSUE EXAM BY PATHOLOGIST: CPT

## 2018-05-22 PROCEDURE — C1776: CPT

## 2018-05-22 PROCEDURE — 86901 BLOOD TYPING SEROLOGIC RH(D): CPT

## 2018-05-22 PROCEDURE — 93005 ELECTROCARDIOGRAM TRACING: CPT

## 2018-05-22 PROCEDURE — 80053 COMPREHEN METABOLIC PANEL: CPT

## 2018-05-22 PROCEDURE — 92610 EVALUATE SWALLOWING FUNCTION: CPT

## 2018-05-22 PROCEDURE — P9016: CPT

## 2018-05-22 PROCEDURE — 86900 BLOOD TYPING SEROLOGIC ABO: CPT

## 2018-05-22 PROCEDURE — 97110 THERAPEUTIC EXERCISES: CPT

## 2018-05-22 PROCEDURE — 99285 EMERGENCY DEPT VISIT HI MDM: CPT | Mod: 25

## 2018-05-22 PROCEDURE — 86923 COMPATIBILITY TEST ELECTRIC: CPT

## 2018-05-22 RX ORDER — HYDROMORPHONE HYDROCHLORIDE 2 MG/ML
1 INJECTION INTRAMUSCULAR; INTRAVENOUS; SUBCUTANEOUS
Qty: 0 | Refills: 0 | COMMUNITY
Start: 2018-05-22

## 2018-05-22 RX ORDER — FERROUS SULFATE 325(65) MG
325 TABLET ORAL
Qty: 0 | Refills: 0 | Status: DISCONTINUED | OUTPATIENT
Start: 2018-05-22 | End: 2018-05-22

## 2018-05-22 RX ORDER — FENTANYL CITRATE 50 UG/ML
1 INJECTION INTRAVENOUS
Qty: 10 | Refills: 0 | OUTPATIENT
Start: 2018-05-22 | End: 2018-06-20

## 2018-05-22 RX ORDER — QUETIAPINE FUMARATE 200 MG/1
1 TABLET, FILM COATED ORAL
Qty: 0 | Refills: 0 | COMMUNITY
Start: 2018-05-22

## 2018-05-22 RX ORDER — ENOXAPARIN SODIUM 100 MG/ML
40 INJECTION SUBCUTANEOUS
Qty: 17 | Refills: 0 | OUTPATIENT
Start: 2018-05-22 | End: 2018-06-07

## 2018-05-22 RX ADMIN — SERTRALINE 100 MILLIGRAM(S): 25 TABLET, FILM COATED ORAL at 11:45

## 2018-05-22 RX ADMIN — Medication 81 MILLIGRAM(S): at 11:45

## 2018-05-22 RX ADMIN — Medication 10 MILLIGRAM(S): at 06:14

## 2018-05-22 RX ADMIN — Medication 12.5 MILLIGRAM(S): at 06:14

## 2018-05-22 RX ADMIN — HYDROMORPHONE HYDROCHLORIDE 2 MILLIGRAM(S): 2 INJECTION INTRAMUSCULAR; INTRAVENOUS; SUBCUTANEOUS at 07:00

## 2018-05-22 RX ADMIN — ENOXAPARIN SODIUM 40 MILLIGRAM(S): 100 INJECTION SUBCUTANEOUS at 11:45

## 2018-05-22 RX ADMIN — LIDOCAINE 1 PATCH: 4 CREAM TOPICAL at 11:45

## 2018-05-22 RX ADMIN — SODIUM CHLORIDE 75 MILLILITER(S): 9 INJECTION, SOLUTION INTRAVENOUS at 06:17

## 2018-05-22 RX ADMIN — HYDROMORPHONE HYDROCHLORIDE 2 MILLIGRAM(S): 2 INJECTION INTRAMUSCULAR; INTRAVENOUS; SUBCUTANEOUS at 06:14

## 2018-05-22 RX ADMIN — RISPERIDONE 0.25 MILLIGRAM(S): 4 TABLET ORAL at 06:14

## 2018-05-22 RX ADMIN — AMLODIPINE BESYLATE 10 MILLIGRAM(S): 2.5 TABLET ORAL at 06:14

## 2018-05-22 RX ADMIN — MEMANTINE HYDROCHLORIDE 5 MILLIGRAM(S): 10 TABLET ORAL at 11:45

## 2018-05-22 RX ADMIN — Medication 650 MILLIGRAM(S): at 11:45

## 2018-06-03 ENCOUNTER — INPATIENT (INPATIENT)
Facility: HOSPITAL | Age: 83
LOS: 3 days | Discharge: HOSPICE | DRG: 205 | End: 2018-06-07
Attending: HOSPITALIST | Admitting: HOSPITALIST
Payer: MEDICARE

## 2018-06-03 VITALS
SYSTOLIC BLOOD PRESSURE: 145 MMHG | TEMPERATURE: 98 F | HEART RATE: 79 BPM | OXYGEN SATURATION: 97 % | RESPIRATION RATE: 16 BRPM | HEIGHT: 63 IN | DIASTOLIC BLOOD PRESSURE: 67 MMHG | WEIGHT: 130.07 LBS

## 2018-06-03 DIAGNOSIS — Z98.41 CATARACT EXTRACTION STATUS, RIGHT EYE: Chronic | ICD-10-CM

## 2018-06-03 DIAGNOSIS — I21.4 NON-ST ELEVATION (NSTEMI) MYOCARDIAL INFARCTION: ICD-10-CM

## 2018-06-03 DIAGNOSIS — Z98.42 CATARACT EXTRACTION STATUS, LEFT EYE: Chronic | ICD-10-CM

## 2018-06-03 LAB
ALBUMIN SERPL ELPH-MCNC: 3 G/DL — LOW (ref 3.3–5.2)
ALP SERPL-CCNC: 86 U/L — SIGNIFICANT CHANGE UP (ref 40–120)
ALT FLD-CCNC: 11 U/L — SIGNIFICANT CHANGE UP
ANION GAP SERPL CALC-SCNC: 16 MMOL/L — SIGNIFICANT CHANGE UP (ref 5–17)
AST SERPL-CCNC: 24 U/L — SIGNIFICANT CHANGE UP
BASOPHILS # BLD AUTO: 0 K/UL — SIGNIFICANT CHANGE UP (ref 0–0.2)
BASOPHILS NFR BLD AUTO: 0.1 % — SIGNIFICANT CHANGE UP (ref 0–2)
BILIRUB SERPL-MCNC: 0.3 MG/DL — LOW (ref 0.4–2)
BUN SERPL-MCNC: 25 MG/DL — HIGH (ref 8–20)
CALCIUM SERPL-MCNC: 8.6 MG/DL — SIGNIFICANT CHANGE UP (ref 8.6–10.2)
CHLORIDE SERPL-SCNC: 100 MMOL/L — SIGNIFICANT CHANGE UP (ref 98–107)
CK SERPL-CCNC: 171 U/L — HIGH (ref 25–170)
CO2 SERPL-SCNC: 22 MMOL/L — SIGNIFICANT CHANGE UP (ref 22–29)
CREAT SERPL-MCNC: 1.18 MG/DL — SIGNIFICANT CHANGE UP (ref 0.5–1.3)
EOSINOPHIL # BLD AUTO: 0.2 K/UL — SIGNIFICANT CHANGE UP (ref 0–0.5)
EOSINOPHIL NFR BLD AUTO: 2.1 % — SIGNIFICANT CHANGE UP (ref 0–6)
GLUCOSE SERPL-MCNC: 81 MG/DL — SIGNIFICANT CHANGE UP (ref 70–115)
HCT VFR BLD CALC: 26.6 % — LOW (ref 37–47)
HGB BLD-MCNC: 8.2 G/DL — LOW (ref 12–16)
LYMPHOCYTES # BLD AUTO: 1 K/UL — SIGNIFICANT CHANGE UP (ref 1–4.8)
LYMPHOCYTES # BLD AUTO: 9.5 % — LOW (ref 20–55)
MCHC RBC-ENTMCNC: 26.9 PG — LOW (ref 27–31)
MCHC RBC-ENTMCNC: 30.8 G/DL — LOW (ref 32–36)
MCV RBC AUTO: 87.2 FL — SIGNIFICANT CHANGE UP (ref 81–99)
MONOCYTES # BLD AUTO: 0.6 K/UL — SIGNIFICANT CHANGE UP (ref 0–0.8)
MONOCYTES NFR BLD AUTO: 5.5 % — SIGNIFICANT CHANGE UP (ref 3–10)
NEUTROPHILS # BLD AUTO: 8.4 K/UL — HIGH (ref 1.8–8)
NEUTROPHILS NFR BLD AUTO: 82.1 % — HIGH (ref 37–73)
NT-PROBNP SERPL-SCNC: 1971 PG/ML — HIGH (ref 0–300)
PLATELET # BLD AUTO: 213 K/UL — SIGNIFICANT CHANGE UP (ref 150–400)
POTASSIUM SERPL-MCNC: 4 MMOL/L — SIGNIFICANT CHANGE UP (ref 3.5–5.3)
POTASSIUM SERPL-SCNC: 4 MMOL/L — SIGNIFICANT CHANGE UP (ref 3.5–5.3)
PROT SERPL-MCNC: 5.9 G/DL — LOW (ref 6.6–8.7)
RBC # BLD: 3.05 M/UL — LOW (ref 4.4–5.2)
RBC # FLD: 14.4 % — SIGNIFICANT CHANGE UP (ref 11–15.6)
SODIUM SERPL-SCNC: 138 MMOL/L — SIGNIFICANT CHANGE UP (ref 135–145)
TROPONIN T SERPL-MCNC: 0.09 NG/ML — HIGH (ref 0–0.06)
WBC # BLD: 10.2 K/UL — SIGNIFICANT CHANGE UP (ref 4.8–10.8)
WBC # FLD AUTO: 10.2 K/UL — SIGNIFICANT CHANGE UP (ref 4.8–10.8)

## 2018-06-03 PROCEDURE — 71045 X-RAY EXAM CHEST 1 VIEW: CPT | Mod: 26

## 2018-06-03 PROCEDURE — 71275 CT ANGIOGRAPHY CHEST: CPT | Mod: 26

## 2018-06-03 PROCEDURE — 70450 CT HEAD/BRAIN W/O DYE: CPT | Mod: 26

## 2018-06-03 PROCEDURE — 99291 CRITICAL CARE FIRST HOUR: CPT

## 2018-06-03 PROCEDURE — 99223 1ST HOSP IP/OBS HIGH 75: CPT

## 2018-06-03 PROCEDURE — 93010 ELECTROCARDIOGRAM REPORT: CPT

## 2018-06-03 RX ORDER — ACETAMINOPHEN 500 MG
650 TABLET ORAL EVERY 6 HOURS
Qty: 0 | Refills: 0 | Status: DISCONTINUED | OUTPATIENT
Start: 2018-06-03 | End: 2018-06-03

## 2018-06-03 RX ORDER — FERROUS SULFATE 325(65) MG
325 TABLET ORAL DAILY
Qty: 0 | Refills: 0 | Status: DISCONTINUED | OUTPATIENT
Start: 2018-06-03 | End: 2018-06-06

## 2018-06-03 RX ORDER — ALPRAZOLAM 0.25 MG
1 TABLET ORAL
Qty: 0 | Refills: 0 | COMMUNITY

## 2018-06-03 RX ORDER — HEPARIN SODIUM 5000 [USP'U]/ML
4500 INJECTION INTRAVENOUS; SUBCUTANEOUS ONCE
Qty: 0 | Refills: 0 | Status: DISCONTINUED | OUTPATIENT
Start: 2018-06-03 | End: 2018-06-03

## 2018-06-03 RX ORDER — ASPIRIN/CALCIUM CARB/MAGNESIUM 324 MG
300 TABLET ORAL ONCE
Qty: 0 | Refills: 0 | Status: COMPLETED | OUTPATIENT
Start: 2018-06-03 | End: 2018-06-03

## 2018-06-03 RX ORDER — ALBUTEROL 90 UG/1
2.5 AEROSOL, METERED ORAL EVERY 4 HOURS
Qty: 0 | Refills: 0 | Status: DISCONTINUED | OUTPATIENT
Start: 2018-06-03 | End: 2018-06-07

## 2018-06-03 RX ORDER — HEPARIN SODIUM 5000 [USP'U]/ML
2000 INJECTION INTRAVENOUS; SUBCUTANEOUS EVERY 6 HOURS
Qty: 0 | Refills: 0 | Status: DISCONTINUED | OUTPATIENT
Start: 2018-06-03 | End: 2018-06-03

## 2018-06-03 RX ORDER — SODIUM CHLORIDE 9 MG/ML
1000 INJECTION INTRAMUSCULAR; INTRAVENOUS; SUBCUTANEOUS
Qty: 0 | Refills: 0 | Status: COMPLETED | OUTPATIENT
Start: 2018-06-03 | End: 2018-06-04

## 2018-06-03 RX ORDER — FERROUS SULFATE 325(65) MG
1 TABLET ORAL
Qty: 0 | Refills: 0 | COMMUNITY

## 2018-06-03 RX ORDER — SERTRALINE 25 MG/1
150 TABLET, FILM COATED ORAL DAILY
Qty: 0 | Refills: 0 | Status: DISCONTINUED | OUTPATIENT
Start: 2018-06-03 | End: 2018-06-06

## 2018-06-03 RX ORDER — TRAVOPROST 0.04 MG/ML
1 SOLUTION/ DROPS OPHTHALMIC
Qty: 0 | Refills: 0 | COMMUNITY

## 2018-06-03 RX ORDER — SODIUM CHLORIDE 9 MG/ML
1000 INJECTION INTRAMUSCULAR; INTRAVENOUS; SUBCUTANEOUS ONCE
Qty: 0 | Refills: 0 | Status: COMPLETED | OUTPATIENT
Start: 2018-06-03 | End: 2018-06-03

## 2018-06-03 RX ORDER — HALOPERIDOL DECANOATE 100 MG/ML
1 INJECTION INTRAMUSCULAR ONCE
Qty: 0 | Refills: 0 | Status: COMPLETED | OUTPATIENT
Start: 2018-06-03 | End: 2018-06-03

## 2018-06-03 RX ORDER — HEPARIN SODIUM 5000 [USP'U]/ML
4500 INJECTION INTRAVENOUS; SUBCUTANEOUS EVERY 6 HOURS
Qty: 0 | Refills: 0 | Status: DISCONTINUED | OUTPATIENT
Start: 2018-06-03 | End: 2018-06-03

## 2018-06-03 RX ORDER — OXYCODONE HYDROCHLORIDE 5 MG/1
10 TABLET ORAL EVERY 6 HOURS
Qty: 0 | Refills: 0 | Status: DISCONTINUED | OUTPATIENT
Start: 2018-06-03 | End: 2018-06-05

## 2018-06-03 RX ORDER — VANCOMYCIN HCL 1 G
1000 VIAL (EA) INTRAVENOUS ONCE
Qty: 0 | Refills: 0 | Status: COMPLETED | OUTPATIENT
Start: 2018-06-03 | End: 2018-06-03

## 2018-06-03 RX ORDER — PREGABALIN 225 MG/1
1000 CAPSULE ORAL DAILY
Qty: 0 | Refills: 0 | Status: DISCONTINUED | OUTPATIENT
Start: 2018-06-03 | End: 2018-06-06

## 2018-06-03 RX ORDER — ASPIRIN/CALCIUM CARB/MAGNESIUM 324 MG
81 TABLET ORAL DAILY
Qty: 0 | Refills: 0 | Status: DISCONTINUED | OUTPATIENT
Start: 2018-06-04 | End: 2018-06-07

## 2018-06-03 RX ORDER — OXYCODONE HYDROCHLORIDE 5 MG/1
5 TABLET ORAL EVERY 6 HOURS
Qty: 0 | Refills: 0 | Status: DISCONTINUED | OUTPATIENT
Start: 2018-06-03 | End: 2018-06-05

## 2018-06-03 RX ORDER — SIMVASTATIN 20 MG/1
40 TABLET, FILM COATED ORAL AT BEDTIME
Qty: 0 | Refills: 0 | Status: DISCONTINUED | OUTPATIENT
Start: 2018-06-03 | End: 2018-06-06

## 2018-06-03 RX ORDER — LATANOPROST 0.05 MG/ML
1 SOLUTION/ DROPS OPHTHALMIC; TOPICAL AT BEDTIME
Qty: 0 | Refills: 0 | Status: DISCONTINUED | OUTPATIENT
Start: 2018-06-03 | End: 2018-06-06

## 2018-06-03 RX ORDER — ENOXAPARIN SODIUM 100 MG/ML
40 INJECTION SUBCUTANEOUS DAILY
Qty: 0 | Refills: 0 | Status: DISCONTINUED | OUTPATIENT
Start: 2018-06-04 | End: 2018-06-07

## 2018-06-03 RX ORDER — METOPROLOL TARTRATE 50 MG
25 TABLET ORAL
Qty: 0 | Refills: 0 | Status: DISCONTINUED | OUTPATIENT
Start: 2018-06-03 | End: 2018-06-06

## 2018-06-03 RX ORDER — ASCORBIC ACID 60 MG
500 TABLET,CHEWABLE ORAL DAILY
Qty: 0 | Refills: 0 | Status: DISCONTINUED | OUTPATIENT
Start: 2018-06-03 | End: 2018-06-06

## 2018-06-03 RX ORDER — LACTOBACILLUS ACIDOPHILUS 100MM CELL
2 CAPSULE ORAL
Qty: 0 | Refills: 0 | COMMUNITY

## 2018-06-03 RX ORDER — IPRATROPIUM/ALBUTEROL SULFATE 18-103MCG
3 AEROSOL WITH ADAPTER (GRAM) INHALATION EVERY 6 HOURS
Qty: 0 | Refills: 0 | Status: COMPLETED | OUTPATIENT
Start: 2018-06-03 | End: 2018-06-05

## 2018-06-03 RX ORDER — HEPARIN SODIUM 5000 [USP'U]/ML
INJECTION INTRAVENOUS; SUBCUTANEOUS
Qty: 25000 | Refills: 0 | Status: DISCONTINUED | OUTPATIENT
Start: 2018-06-03 | End: 2018-06-03

## 2018-06-03 RX ORDER — AMLODIPINE BESYLATE 2.5 MG/1
10 TABLET ORAL DAILY
Qty: 0 | Refills: 0 | Status: DISCONTINUED | OUTPATIENT
Start: 2018-06-03 | End: 2018-06-06

## 2018-06-03 RX ORDER — MAGNESIUM OXIDE 400 MG ORAL TABLET 241.3 MG
1 TABLET ORAL
Qty: 0 | Refills: 0 | COMMUNITY

## 2018-06-03 RX ORDER — LACTOBACILLUS ACIDOPHILUS 100MM CELL
1 CAPSULE ORAL
Qty: 0 | Refills: 0 | Status: DISCONTINUED | OUTPATIENT
Start: 2018-06-03 | End: 2018-06-06

## 2018-06-03 RX ORDER — ALPRAZOLAM 0.25 MG
0.5 TABLET ORAL THREE TIMES A DAY
Qty: 0 | Refills: 0 | Status: DISCONTINUED | OUTPATIENT
Start: 2018-06-03 | End: 2018-06-06

## 2018-06-03 RX ORDER — ACETAMINOPHEN 500 MG
650 TABLET ORAL EVERY 6 HOURS
Qty: 0 | Refills: 0 | Status: DISCONTINUED | OUTPATIENT
Start: 2018-06-03 | End: 2018-06-05

## 2018-06-03 RX ORDER — ACETAMINOPHEN 500 MG
650 TABLET ORAL EVERY 6 HOURS
Qty: 0 | Refills: 0 | Status: DISCONTINUED | OUTPATIENT
Start: 2018-06-03 | End: 2018-06-07

## 2018-06-03 RX ADMIN — HALOPERIDOL DECANOATE 1 MILLIGRAM(S): 100 INJECTION INTRAMUSCULAR at 23:18

## 2018-06-03 RX ADMIN — SODIUM CHLORIDE 1000 MILLILITER(S): 9 INJECTION INTRAMUSCULAR; INTRAVENOUS; SUBCUTANEOUS at 16:48

## 2018-06-03 NOTE — H&P ADULT - ASSESSMENT
89 years old female with PMH of CAD, DM, HTN, HLD and Dementia sent from [facility] with dyspnea and hypoxia (88% on 3L).  As per [facility] charts "Resident with increased anxiety and non compliant with keeping oxygen on. Frequently yells out. Confused at times".  Patient is alert and awake and keeps asking for help. Unable to clarify her complaints/needs - just keep asking for help.   Patient had right hip fracture in May 2018 and was hospitalized at [facility]. She had hemiarthroplasty and was sent to rehab. As per family, her condition has been declining since her fracture. Earlier today she was very lethargic. No fever or cough.   She has dysphagia and is on Pureed Diet. 89 years old female with PMH of CAD, DM, CKD, HTN, HLD and Dementia sent from [facility] with dyspnea and hypoxia (88% on 3L).  As per [facility] charts "Resident with increased anxiety and non compliant with keeping oxygen on. Frequently yells out. Confused at times".  Patient is alert and awake and keeps asking for help. Unable to clarify her complaints/needs - just keep asking for help.   Patient had right hip fracture in May 2018 and was hospitalized at [facility]. She had hemiarthroplasty and was sent to rehab. As per family, her condition has been declining since her fracture. Earlier today she was very lethargic. No fever or cough.   She has dysphagia and is on Pureed Diet.     1) Hypoxia  - Unclear etiology. Currently satting well on room air. No respiratory distress.   - CTA ruled out PE and Pneumonia.  - DuoNebs q 6 hours and Incentive Spirometry  2) Elevated Troponin  - No acute EKG changes.  - Trend Troponin.  - Aspirin 300 mg suppository stat  - Hold Heparin Infusion (ordered in ER). Discussed with patient's son Ted Schultz - agreed with plan.  3) Dehydration  - NS bolus given in ER. Continue IVF at 100 cc/hour.  4) CKD Stage 3  - Stable   - Avoid nephrotoxic medications  5) DM  - Diet controlled  6) CAD, HLD and HTN  - Continue Aspirin, Simvastatin, Metoprolol and Amlodipine  7) Dysphagia  - Aspiration precautions  - Pureed Diet  DVT Prophylaxis -- Lovenox 40 mg

## 2018-06-03 NOTE — ED PROVIDER NOTE - OBJECTIVE STATEMENT
This patient is a 89 year old woman who presents to the ER from This patient is a 89 year old woman who presents to the ER from rehab with reports of SOB.  As per EMS the rehab reports that the patient's baseline mental status is disoriented. This patient is a 89 year old woman who presents to the ER from rehab with reports of SOB.  As per EMS the rehab reports that the patient's baseline mental status is disoriented.  Patient not answering questions to provide information.  Her sons who arrived at bedside states that the patient has declined rapidly since she was sent to rehab after a partial hemiarthroplasty 3 weeks ago.  Her sons states that she has no lung disease or cardiac conditions.  Her baseline mental status prior to the surgery was awake, alert and oriented x 1-2 and when she is admitted to the hospital she often times can become delirious.      During the patient's last admission she developed dysphagia and is now on a pureed diet.  For the past 2 days she seems to have been declining.  Patient is usually able to care for herself and ambulated with a walker prior to the surgery.  Today her son states that he visited her and she appeared well but lethargic.  The son was called later and told the patient's oxygen saturation was 88%.

## 2018-06-03 NOTE — ED PROVIDER NOTE - SHIFT CHANGE DETAILS
F/U CT scan patient with high suspicion for PE heparin ordered.  Patient requires admission for hypoxia, ams.

## 2018-06-03 NOTE — H&P ADULT - NSHPLABSRESULTS_GEN_ALL_CORE
LABS:                        8.2    10.2  )-----------( 213      ( 03 Jun 2018 16:55 )             26.6     06-03    138  |  100  |  25.0<H>  ----------------------------<  81  4.0   |  22.0  |  1.18    Ca    8.6      03 Jun 2018 16:55    TPro  5.9<L>  /  Alb  3.0<L>  /  TBili  0.3<L>  /  DBili  x   /  AST  24  /  ALT  11  /  AlkPhos  86  06-03      CARDIAC MARKERS ( 03 Jun 2018 16:55 )  x     / 0.09 ng/mL / 171 U/L / x     / 11.2 ng/mL      CT Angio Chest w/ IV Cont (06.03.18 @ 21:45)  No central pulmonary embolism. Evaluation of subsegmental branches at the lung bases is degraded by respiratory motion artifact and subsegmental atelectasis.  Bibasilar streaky atelectasis.    EKG: NSR at 79 bpm. Left axis deviation. No acute ST changes.

## 2018-06-03 NOTE — H&P ADULT - HISTORY OF PRESENT ILLNESS
Resident with increased anxiety and non compliant with keeping oxygen on. Resident with increased anxiety and non compliant with keeping oxygen on.   Frequently yells out. Resident with increased anxiety and non compliant with keeping oxygen on.   Frequently yells out. Confused at times. 89 years old female with PMH of CAD, DM, HTN, HLD and Dementia sent from [facility] with dyspnea and hypoxia (88% on 3L).  As per [facility] charts "Resident with increased anxiety and non compliant with keeping oxygen on. Frequently yells out. Confused at times".  Patient is alert and awake and keeps asking for help. Unable to clarify her complaints/needs - just keep asking for help.   Patient had right hip fracture in May 2018 and was hospitalized at [facility]. She had hemiarthroplasty and was sent to rehab. As per family, her condition has been declining since her fracture. Earlier today she was very lethargic. No fever or cough.   She has dysphagia and is on Pureed Diet. 89 years old female with PMH of CAD, DM, CKD, HTN, HLD and Dementia sent from [facility] with dyspnea and hypoxia (88% on 3L).  As per [facility] charts "Resident with increased anxiety and non compliant with keeping oxygen on. Frequently yells out. Confused at times".  Patient is alert and awake and keeps asking for help. Unable to clarify her complaints/needs - just keep asking for help.   Patient had right hip fracture in May 2018 and was hospitalized at [facility]. She had hemiarthroplasty and was sent to rehab. As per family, her condition has been declining since her fracture. Earlier today she was very lethargic. No fever or cough.   She has dysphagia and is on Pureed Diet.

## 2018-06-03 NOTE — ED PROVIDER NOTE - CARE PLAN
Principal Discharge DX:	NSTEMI (non-ST elevated myocardial infarction) Principal Discharge DX:	NSTEMI (non-ST elevated myocardial infarction)  Secondary Diagnosis:	Pneumonia  Secondary Diagnosis:	Hypoxia

## 2018-06-03 NOTE — H&P ADULT - NSHPSOCIALHISTORY_GEN_ALL_CORE
Resident of [facility] [place] .  Resident of [facility] [place].  No smoking, alcohol or illicit drug use.

## 2018-06-03 NOTE — ED ADULT TRIAGE NOTE - NURSING HOMES
[facility] [place]

## 2018-06-03 NOTE — ED ADULT NURSE NOTE - OBJECTIVE STATEMENT
In [place], difficulty breathing. In [place], difficulty breathing. Hip replacement ~3 weeks ago here.  Respirations are slow, shallow.

## 2018-06-03 NOTE — ED ADULT TRIAGE NOTE - CHIEF COMPLAINT QUOTE
as per ems, pt sent for eval of sob. arrives breathing even and unlabored on ra. as per nursing home papers, baseline disoriented.

## 2018-06-03 NOTE — ED PROVIDER NOTE - MEDICAL DECISION MAKING DETAILS
Patient reported SOB to staff now not vocalizing complaints and appears very lethargic.  O2 saturation 86%.  Due to her recent surgery PE is high on differential for reasoning behind patient'd decompensation.  Will check Labs, CXR, EKG, CT and Re-eval.

## 2018-06-03 NOTE — ED PROVIDER NOTE - PROGRESS NOTE DETAILS
As per signout from Dr. Zapata, patient with complaint of acute onset of dyspnea whom is currently awaiting CTA for evaluation of PE causing heart strain. Patient now has usable IV for IV contrast. Previous iv's were not usable. Patient stable. Spoke to riki's son's they are  unaware of any contrindications to giving iv CONTRAST. pATIENT URGENT NEED FOR iv CONTRAST. Patient stable.

## 2018-06-03 NOTE — H&P ADULT - NSHPPHYSICALEXAM_GEN_ALL_CORE
Vital Signs   T(C): 36.8 (03 Jun 2018 14:40), Max: 36.8 (03 Jun 2018 14:40)  T(F): 98.3 (03 Jun 2018 14:40), Max: 98.3 (03 Jun 2018 14:40)  HR: 86 (03 Jun 2018 20:00) (74 - 86)  BP: 123/68 (03 Jun 2018 20:00) (123/68 - 158/73)  RR: 15 (03 Jun 2018 20:00) (13 - 16)  SpO2: 95% (03 Jun 2018 20:00) (86% - 100%)  General: Elderly female lying in bed comfortably but keeps asking for help  HEENT: EOMI. Clear conjunctivae. Dry mucus membrane  Neck: Supple. No JVD.   Chest: CTA bilaterally - no wheezing, rales or rhonchi.   Heart: Normal S1 & S2 with RRR. No murmur.   Abdomen: Soft. Non-tender. Non-distended. + BS  Ext: No pedal edema. No calf tenderness   Neuro: Active and alert. No focal deficit. Moves all four extremities. Follows commands intermittently.   Skin: Warm and Dry. Surgical Incision clean and healthy on right hip. Not allowing to examine her heels.   Psychiatry: Anxious and agitated

## 2018-06-04 LAB
ANION GAP SERPL CALC-SCNC: 17 MMOL/L — SIGNIFICANT CHANGE UP (ref 5–17)
BASOPHILS # BLD AUTO: 0 K/UL — SIGNIFICANT CHANGE UP (ref 0–0.2)
BASOPHILS NFR BLD AUTO: 0.1 % — SIGNIFICANT CHANGE UP (ref 0–2)
BUN SERPL-MCNC: 20 MG/DL — SIGNIFICANT CHANGE UP (ref 8–20)
CALCIUM SERPL-MCNC: 8.4 MG/DL — LOW (ref 8.6–10.2)
CHLORIDE SERPL-SCNC: 103 MMOL/L — SIGNIFICANT CHANGE UP (ref 98–107)
CK SERPL-CCNC: 106 U/L — SIGNIFICANT CHANGE UP (ref 25–170)
CK SERPL-CCNC: 132 U/L — SIGNIFICANT CHANGE UP (ref 25–170)
CO2 SERPL-SCNC: 19 MMOL/L — LOW (ref 22–29)
CREAT SERPL-MCNC: 1.02 MG/DL — SIGNIFICANT CHANGE UP (ref 0.5–1.3)
EOSINOPHIL # BLD AUTO: 0.1 K/UL — SIGNIFICANT CHANGE UP (ref 0–0.5)
EOSINOPHIL NFR BLD AUTO: 1.4 % — SIGNIFICANT CHANGE UP (ref 0–6)
GLUCOSE SERPL-MCNC: 77 MG/DL — SIGNIFICANT CHANGE UP (ref 70–115)
HCT VFR BLD CALC: 26.5 % — LOW (ref 37–47)
HGB BLD-MCNC: 8.2 G/DL — LOW (ref 12–16)
LACTATE BLDV-MCNC: 0.8 MMOL/L — SIGNIFICANT CHANGE UP (ref 0.5–2)
LYMPHOCYTES # BLD AUTO: 0.6 K/UL — LOW (ref 1–4.8)
LYMPHOCYTES # BLD AUTO: 6.3 % — LOW (ref 20–55)
MAGNESIUM SERPL-MCNC: 1.5 MG/DL — LOW (ref 1.6–2.6)
MCHC RBC-ENTMCNC: 27.1 PG — SIGNIFICANT CHANGE UP (ref 27–31)
MCHC RBC-ENTMCNC: 30.9 G/DL — LOW (ref 32–36)
MCV RBC AUTO: 87.5 FL — SIGNIFICANT CHANGE UP (ref 81–99)
MONOCYTES # BLD AUTO: 0.7 K/UL — SIGNIFICANT CHANGE UP (ref 0–0.8)
MONOCYTES NFR BLD AUTO: 6.4 % — SIGNIFICANT CHANGE UP (ref 3–10)
NEUTROPHILS # BLD AUTO: 8.8 K/UL — HIGH (ref 1.8–8)
NEUTROPHILS NFR BLD AUTO: 85.2 % — HIGH (ref 37–73)
PLATELET # BLD AUTO: 236 K/UL — SIGNIFICANT CHANGE UP (ref 150–400)
POTASSIUM SERPL-MCNC: 3.8 MMOL/L — SIGNIFICANT CHANGE UP (ref 3.5–5.3)
POTASSIUM SERPL-SCNC: 3.8 MMOL/L — SIGNIFICANT CHANGE UP (ref 3.5–5.3)
RBC # BLD: 3.03 M/UL — LOW (ref 4.4–5.2)
RBC # FLD: 14.5 % — SIGNIFICANT CHANGE UP (ref 11–15.6)
SODIUM SERPL-SCNC: 139 MMOL/L — SIGNIFICANT CHANGE UP (ref 135–145)
TROPONIN T SERPL-MCNC: 0.07 NG/ML — HIGH (ref 0–0.06)
TROPONIN T SERPL-MCNC: 0.08 NG/ML — HIGH (ref 0–0.06)
WBC # BLD: 10.4 K/UL — SIGNIFICANT CHANGE UP (ref 4.8–10.8)
WBC # FLD AUTO: 10.4 K/UL — SIGNIFICANT CHANGE UP (ref 4.8–10.8)

## 2018-06-04 PROCEDURE — 99233 SBSQ HOSP IP/OBS HIGH 50: CPT

## 2018-06-04 RX ORDER — MAGNESIUM SULFATE 500 MG/ML
2 VIAL (ML) INJECTION ONCE
Qty: 0 | Refills: 0 | Status: COMPLETED | OUTPATIENT
Start: 2018-06-04 | End: 2018-06-04

## 2018-06-04 RX ORDER — HALOPERIDOL DECANOATE 100 MG/ML
2 INJECTION INTRAMUSCULAR ONCE
Qty: 0 | Refills: 0 | Status: COMPLETED | OUTPATIENT
Start: 2018-06-04 | End: 2018-06-04

## 2018-06-04 RX ORDER — HALOPERIDOL DECANOATE 100 MG/ML
2 INJECTION INTRAMUSCULAR EVERY 6 HOURS
Qty: 0 | Refills: 0 | Status: DISCONTINUED | OUTPATIENT
Start: 2018-06-04 | End: 2018-06-05

## 2018-06-04 RX ORDER — HALOPERIDOL DECANOATE 100 MG/ML
2 INJECTION INTRAMUSCULAR EVERY 6 HOURS
Qty: 0 | Refills: 0 | Status: DISCONTINUED | OUTPATIENT
Start: 2018-06-04 | End: 2018-06-04

## 2018-06-04 RX ORDER — RISPERIDONE 4 MG/1
0.5 TABLET ORAL EVERY 12 HOURS
Qty: 0 | Refills: 0 | Status: DISCONTINUED | OUTPATIENT
Start: 2018-06-04 | End: 2018-06-07

## 2018-06-04 RX ORDER — RISPERIDONE 4 MG/1
0.25 TABLET ORAL EVERY 12 HOURS
Qty: 0 | Refills: 0 | Status: DISCONTINUED | OUTPATIENT
Start: 2018-06-04 | End: 2018-06-04

## 2018-06-04 RX ADMIN — SERTRALINE 150 MILLIGRAM(S): 25 TABLET, FILM COATED ORAL at 14:07

## 2018-06-04 RX ADMIN — ENOXAPARIN SODIUM 40 MILLIGRAM(S): 100 INJECTION SUBCUTANEOUS at 14:07

## 2018-06-04 RX ADMIN — Medication 25 MILLIGRAM(S): at 17:35

## 2018-06-04 RX ADMIN — Medication 500 MILLIGRAM(S): at 14:07

## 2018-06-04 RX ADMIN — Medication 3 MILLILITER(S): at 08:43

## 2018-06-04 RX ADMIN — PREGABALIN 1000 MICROGRAM(S): 225 CAPSULE ORAL at 14:07

## 2018-06-04 RX ADMIN — Medication 325 MILLIGRAM(S): at 14:07

## 2018-06-04 RX ADMIN — Medication 3 MILLILITER(S): at 03:35

## 2018-06-04 RX ADMIN — Medication 81 MILLIGRAM(S): at 14:07

## 2018-06-04 RX ADMIN — SODIUM CHLORIDE 100 MILLILITER(S): 9 INJECTION INTRAMUSCULAR; INTRAVENOUS; SUBCUTANEOUS at 06:32

## 2018-06-04 RX ADMIN — LATANOPROST 1 DROP(S): 0.05 SOLUTION/ DROPS OPHTHALMIC; TOPICAL at 21:21

## 2018-06-04 RX ADMIN — Medication 3 MILLILITER(S): at 20:25

## 2018-06-04 RX ADMIN — HALOPERIDOL DECANOATE 2 MILLIGRAM(S): 100 INJECTION INTRAMUSCULAR at 09:11

## 2018-06-04 RX ADMIN — RISPERIDONE 0.5 MILLIGRAM(S): 4 TABLET ORAL at 17:36

## 2018-06-04 RX ADMIN — SIMVASTATIN 40 MILLIGRAM(S): 20 TABLET, FILM COATED ORAL at 21:21

## 2018-06-04 RX ADMIN — Medication 300 MILLIGRAM(S): at 00:06

## 2018-06-04 RX ADMIN — Medication 1 MILLIGRAM(S): at 10:00

## 2018-06-04 RX ADMIN — Medication 1 MILLIGRAM(S): at 00:06

## 2018-06-04 RX ADMIN — Medication 250 MILLIGRAM(S): at 00:10

## 2018-06-04 RX ADMIN — Medication 1 TABLET(S): at 17:36

## 2018-06-04 RX ADMIN — SODIUM CHLORIDE 100 MILLILITER(S): 9 INJECTION INTRAMUSCULAR; INTRAVENOUS; SUBCUTANEOUS at 21:22

## 2018-06-04 RX ADMIN — HALOPERIDOL DECANOATE 2 MILLIGRAM(S): 100 INJECTION INTRAMUSCULAR at 05:14

## 2018-06-04 RX ADMIN — Medication 50 GRAM(S): at 14:07

## 2018-06-04 NOTE — PROGRESS NOTE ADULT - SUBJECTIVE AND OBJECTIVE BOX
CC: Dyspnea with Hypoxia (88% on 3L)     HPI:  89 years old female with PMH of CAD, DM, CKD, HTN, HLD and Dementia [facility] and hypoxia (88% on 3L).  As per [facility] charts "Resident with increased anxiety and non compliant with keeping oxygen on. Frequently yells out. Confused at times".  Patient had right hip fracture in May 2018 and was hospitalized at [facility]. She had hemiarthroplasty and was sent to rehab. As per family, her condition has been declining since her fracture.     INTERVAL HPI/OVERNIGHT EVENTS: Patient seen and examined lying in bed.  Patient confused and unable to answer ROS.      Vital Signs Last 24 Hrs  T(C): 36.5 (04 Jun 2018 11:50), Max: 36.5 (04 Jun 2018 11:50)  T(F): 97.7 (04 Jun 2018 11:50), Max: 97.7 (04 Jun 2018 11:50)  HR: 90 (04 Jun 2018 11:50) (67 - 90)  BP: 143/68 (04 Jun 2018 11:50) (123/68 - 162/81)  BP(mean): --  RR: 18 (04 Jun 2018 11:50) (13 - 20)  SpO2: 97% (04 Jun 2018 11:50) (86% - 100%)  I&O's Detail      PHYSICAL EXAM:  GENERAL: NAD, well-groomed, well-developed  HEAD:  Atraumatic, Normocephalic  NECK: Supple, No JVD, Normal thyroid  NERVOUS SYSTEM:  Alert, disoriented, generalized weakness  CHEST/LUNG: Clear to auscultation bilaterally; No rales, rhonchi, wheezing, or rubs  HEART: Regular rate and rhythm; No murmurs, rubs, or gallops  ABDOMEN: Soft, Nontender, Nondistended; Bowel sounds present  EXTREMITIES:  2+ Peripheral Pulses, No clubbing, cyanosis, or edema        CARDIAC MARKERS ( 04 Jun 2018 08:30 )  x     / 0.07 ng/mL / 106 U/L / x     / x      CARDIAC MARKERS ( 04 Jun 2018 00:42 )  x     / 0.08 ng/mL / 132 U/L / x     / x      CARDIAC MARKERS ( 03 Jun 2018 16:55 )  x     / 0.09 ng/mL / 171 U/L / x     / 11.2 ng/mL                            8.2    10.4  )-----------( 236      ( 04 Jun 2018 08:30 )             26.5     04 Jun 2018 08:30    139    |  103    |  20.0   ----------------------------<  77     3.8     |  19.0   |  1.02     Ca    8.4        04 Jun 2018 08:30  Mg     1.5       04 Jun 2018 08:30    TPro  5.9    /  Alb  3.0    /  TBili  0.3    /  DBili  x      /  AST  24     /  ALT  11     /  AlkPhos  86     03 Jun 2018 16:55        LIVER FUNCTIONS - ( 03 Jun 2018 16:55 )  Alb: 3.0 g/dL / Pro: 5.9 g/dL / ALK PHOS: 86 U/L / ALT: 11 U/L / AST: 24 U/L / GGT: x               MEDICATIONS  (STANDING):  ALBUTerol/ipratropium for Nebulization 3 milliLiter(s) Nebulizer every 6 hours  amLODIPine   Tablet 10 milliGRAM(s) Oral daily  ascorbic acid 500 milliGRAM(s) Oral daily  aspirin  chewable 81 milliGRAM(s) Oral daily  cyanocobalamin 1000 MICROGram(s) Oral daily  enoxaparin Injectable 40 milliGRAM(s) SubCutaneous daily  ferrous    sulfate 325 milliGRAM(s) Oral daily  haloperidol    Injectable 2 milliGRAM(s) IntraMuscular every 6 hours  lactobacillus acidophilus 1 Tablet(s) Oral two times a day with meals  latanoprost 0.005% Ophthalmic Solution 1 Drop(s) Both EYES at bedtime  metoprolol tartrate 25 milliGRAM(s) Oral two times a day  risperiDONE   Tablet 0.25 milliGRAM(s) Oral every 12 hours  sertraline 150 milliGRAM(s) Oral daily  simvastatin 40 milliGRAM(s) Oral at bedtime  sodium chloride 0.9%. 1000 milliLiter(s) (100 mL/Hr) IV Continuous <Continuous>    MEDICATIONS  (PRN):  acetaminophen   Tablet 650 milliGRAM(s) Oral every 6 hours PRN For Temp greater than 38 C (100.4 F)  acetaminophen   Tablet. 650 milliGRAM(s) Oral every 6 hours PRN Mild Pain (1 - 3)  ALBUTerol    0.083% 2.5 milliGRAM(s) Nebulizer every 4 hours PRN Shortness of Breath and/or Wheezing  ALPRAZolam 0.5 milliGRAM(s) Oral three times a day PRN Anxiety  oxyCODONE    IR 10 milliGRAM(s) Oral every 6 hours PRN Severe Pain (7 - 10)  oxyCODONE    IR 5 milliGRAM(s) Oral every 6 hours PRN Moderate Pain (4 - 6)      RADIOLOGY & ADDITIONAL TESTS:  < from: CT Angio Chest w/ IV Cont (06.03.18 @ 21:45) >   EXAM:  CT ANGIO CHEST (W)AW IC                          PROCEDURE DATE:  06/03/2018          INTERPRETATION:  Chest CT angiography (Pulmonary embolism protocol)    Indication:  Hypoxic, altered mental status    Technique:  Axial postcontrast images were obtained during maximal   projected opacification of the pulmonary arterial vasculature. 64 cc of   Omnipaque 350 was administered intravenously without complication.    Reformatted coronal and sagittal images are submitted.  Axial MIP images   are submitted.    Comparison:  none    FINDINGS:    Pulmonary arterial opacification is optimal.  There is no evidence of   central pulmonary embolism.  Evaluation of subsegmental branches is   limited by atelectasis and respiratory motion artifact. The great vessels   are not dilated.  There is atherosclerotic calcification of the thoracic   aorta and coronary arteries.    The visualized neck, axilla and subcutaneous tissues are unremarkable.    The tracheobronchial tree is patent centrally.  There is no significant   mediastinal or hilar adenopathy.      The heart is not enlarged.  There is no pericardial effusion.    There are atelectatic changes at the lung bases.    The visualized bones and upper abdomen are unremarkable.    IMPRESSION:    No central pulmonary embolism. Evaluation of subsegmental branches at the   lung bases is degraded by respiratory motion artifact and subsegmental   atelectasis.  Bibasilar streaky atelectasis.                  SHAI BURROUGHS M.D, ATTENDING RADIOLOGIST  This document has been electronically signed. [place]  3 2018 10:40PM              < end of copied text >

## 2018-06-04 NOTE — ED ADULT NURSE REASSESSMENT NOTE - NS ED NURSE REASSESS COMMENT FT1
Report received fro outgoing RN's Dennis at change of shift and assumed care of pt at that time. Pt received on stretcher awake, confused, yelling which as per report has been general pt state. Pt made comfortable as possible, noted to have scattered ecchymosis to BLUE andf clean dry intact dressing to right ankle and kerlix around IV to RUE. Warm blankets applied, emotional support give, fall precautions in place, await bed.
assumed care of patient, yelling help, stops when asking what she needs, emotional support given, sip of water given, remains on CM
ED MD discussing plan of care with patient's family. Pt resting comfortably will continue to monitor.

## 2018-06-05 LAB
ANION GAP SERPL CALC-SCNC: 14 MMOL/L — SIGNIFICANT CHANGE UP (ref 5–17)
BUN SERPL-MCNC: 17 MG/DL — SIGNIFICANT CHANGE UP (ref 8–20)
CALCIUM SERPL-MCNC: 8.4 MG/DL — LOW (ref 8.6–10.2)
CHLORIDE SERPL-SCNC: 108 MMOL/L — HIGH (ref 98–107)
CO2 SERPL-SCNC: 20 MMOL/L — LOW (ref 22–29)
CREAT SERPL-MCNC: 0.95 MG/DL — SIGNIFICANT CHANGE UP (ref 0.5–1.3)
GLUCOSE SERPL-MCNC: 90 MG/DL — SIGNIFICANT CHANGE UP (ref 70–115)
HCT VFR BLD CALC: 26.1 % — LOW (ref 37–47)
HGB BLD-MCNC: 7.8 G/DL — LOW (ref 12–16)
MAGNESIUM SERPL-MCNC: 1.8 MG/DL — SIGNIFICANT CHANGE UP (ref 1.6–2.6)
MCHC RBC-ENTMCNC: 26.8 PG — LOW (ref 27–31)
MCHC RBC-ENTMCNC: 29.9 G/DL — LOW (ref 32–36)
MCV RBC AUTO: 89.7 FL — SIGNIFICANT CHANGE UP (ref 81–99)
PLATELET # BLD AUTO: 297 K/UL — SIGNIFICANT CHANGE UP (ref 150–400)
POTASSIUM SERPL-MCNC: 3.8 MMOL/L — SIGNIFICANT CHANGE UP (ref 3.5–5.3)
POTASSIUM SERPL-SCNC: 3.8 MMOL/L — SIGNIFICANT CHANGE UP (ref 3.5–5.3)
PREALB SERPL-MCNC: 10 MG/DL — LOW (ref 18–38)
RBC # BLD: 2.91 M/UL — LOW (ref 4.4–5.2)
RBC # FLD: 14.2 % — SIGNIFICANT CHANGE UP (ref 11–15.6)
SODIUM SERPL-SCNC: 142 MMOL/L — SIGNIFICANT CHANGE UP (ref 135–145)
WBC # BLD: 14 K/UL — HIGH (ref 4.8–10.8)
WBC # FLD AUTO: 14 K/UL — HIGH (ref 4.8–10.8)

## 2018-06-05 PROCEDURE — 99222 1ST HOSP IP/OBS MODERATE 55: CPT

## 2018-06-05 PROCEDURE — 99233 SBSQ HOSP IP/OBS HIGH 50: CPT

## 2018-06-05 RX ORDER — HYDROMORPHONE HYDROCHLORIDE 2 MG/ML
1 INJECTION INTRAMUSCULAR; INTRAVENOUS; SUBCUTANEOUS EVERY 6 HOURS
Qty: 0 | Refills: 0 | Status: DISCONTINUED | OUTPATIENT
Start: 2018-06-05 | End: 2018-06-05

## 2018-06-05 RX ORDER — ACETAMINOPHEN 500 MG
650 TABLET ORAL EVERY 6 HOURS
Qty: 0 | Refills: 0 | Status: DISCONTINUED | OUTPATIENT
Start: 2018-06-05 | End: 2018-06-07

## 2018-06-05 RX ORDER — ACETAMINOPHEN 500 MG
1000 TABLET ORAL ONCE
Qty: 0 | Refills: 0 | Status: COMPLETED | OUTPATIENT
Start: 2018-06-05 | End: 2018-06-05

## 2018-06-05 RX ORDER — HYDROMORPHONE HYDROCHLORIDE 2 MG/ML
0.5 INJECTION INTRAMUSCULAR; INTRAVENOUS; SUBCUTANEOUS EVERY 4 HOURS
Qty: 0 | Refills: 0 | Status: DISCONTINUED | OUTPATIENT
Start: 2018-06-05 | End: 2018-06-07

## 2018-06-05 RX ORDER — HYDROMORPHONE HYDROCHLORIDE 2 MG/ML
1 INJECTION INTRAMUSCULAR; INTRAVENOUS; SUBCUTANEOUS ONCE
Qty: 0 | Refills: 0 | Status: DISCONTINUED | OUTPATIENT
Start: 2018-06-05 | End: 2018-06-05

## 2018-06-05 RX ORDER — HALOPERIDOL DECANOATE 100 MG/ML
1 INJECTION INTRAMUSCULAR EVERY 6 HOURS
Qty: 0 | Refills: 0 | Status: DISCONTINUED | OUTPATIENT
Start: 2018-06-05 | End: 2018-06-07

## 2018-06-05 RX ORDER — ALPRAZOLAM 0.25 MG
0.5 TABLET ORAL EVERY 8 HOURS
Qty: 0 | Refills: 0 | Status: DISCONTINUED | OUTPATIENT
Start: 2018-06-05 | End: 2018-06-06

## 2018-06-05 RX ORDER — DOCUSATE SODIUM 100 MG
100 CAPSULE ORAL
Qty: 0 | Refills: 0 | Status: DISCONTINUED | OUTPATIENT
Start: 2018-06-05 | End: 2018-06-06

## 2018-06-05 RX ORDER — OXYCODONE HYDROCHLORIDE 5 MG/1
10 TABLET ORAL EVERY 4 HOURS
Qty: 0 | Refills: 0 | Status: DISCONTINUED | OUTPATIENT
Start: 2018-06-05 | End: 2018-06-06

## 2018-06-05 RX ORDER — OXYCODONE HYDROCHLORIDE 5 MG/1
10 TABLET ORAL EVERY 6 HOURS
Qty: 0 | Refills: 0 | Status: DISCONTINUED | OUTPATIENT
Start: 2018-06-05 | End: 2018-06-06

## 2018-06-05 RX ORDER — FENTANYL CITRATE 50 UG/ML
1 INJECTION INTRAVENOUS
Qty: 0 | Refills: 0 | Status: DISCONTINUED | OUTPATIENT
Start: 2018-06-05 | End: 2018-06-07

## 2018-06-05 RX ADMIN — FENTANYL CITRATE 1 PATCH: 50 INJECTION INTRAVENOUS at 09:26

## 2018-06-05 RX ADMIN — HALOPERIDOL DECANOATE 2 MILLIGRAM(S): 100 INJECTION INTRAMUSCULAR at 06:03

## 2018-06-05 RX ADMIN — Medication 10 MILLIGRAM(S): at 21:10

## 2018-06-05 RX ADMIN — HYDROMORPHONE HYDROCHLORIDE 1 MILLIGRAM(S): 2 INJECTION INTRAMUSCULAR; INTRAVENOUS; SUBCUTANEOUS at 09:42

## 2018-06-05 RX ADMIN — AMLODIPINE BESYLATE 10 MILLIGRAM(S): 2.5 TABLET ORAL at 06:03

## 2018-06-05 RX ADMIN — Medication 1000 MILLIGRAM(S): at 12:30

## 2018-06-05 RX ADMIN — LATANOPROST 1 DROP(S): 0.05 SOLUTION/ DROPS OPHTHALMIC; TOPICAL at 21:24

## 2018-06-05 RX ADMIN — Medication 3 MILLILITER(S): at 21:00

## 2018-06-05 RX ADMIN — HYDROMORPHONE HYDROCHLORIDE 1 MILLIGRAM(S): 2 INJECTION INTRAMUSCULAR; INTRAVENOUS; SUBCUTANEOUS at 15:04

## 2018-06-05 RX ADMIN — Medication 0.5 MILLIGRAM(S): at 07:45

## 2018-06-05 RX ADMIN — Medication 25 MILLIGRAM(S): at 06:03

## 2018-06-05 RX ADMIN — Medication 0.5 MILLIGRAM(S): at 21:10

## 2018-06-05 RX ADMIN — SIMVASTATIN 40 MILLIGRAM(S): 20 TABLET, FILM COATED ORAL at 21:10

## 2018-06-05 RX ADMIN — Medication 10 MILLIGRAM(S): at 17:07

## 2018-06-05 RX ADMIN — OXYCODONE HYDROCHLORIDE 10 MILLIGRAM(S): 5 TABLET ORAL at 22:50

## 2018-06-05 RX ADMIN — Medication 400 MILLIGRAM(S): at 11:37

## 2018-06-05 RX ADMIN — ENOXAPARIN SODIUM 40 MILLIGRAM(S): 100 INJECTION SUBCUTANEOUS at 15:10

## 2018-06-05 RX ADMIN — Medication 1 TABLET(S): at 08:26

## 2018-06-05 RX ADMIN — RISPERIDONE 0.5 MILLIGRAM(S): 4 TABLET ORAL at 06:03

## 2018-06-05 RX ADMIN — OXYCODONE HYDROCHLORIDE 10 MILLIGRAM(S): 5 TABLET ORAL at 23:33

## 2018-06-05 RX ADMIN — Medication 3 MILLILITER(S): at 14:21

## 2018-06-05 RX ADMIN — Medication 3 MILLILITER(S): at 09:23

## 2018-06-05 RX ADMIN — Medication 3 MILLILITER(S): at 03:02

## 2018-06-05 RX ADMIN — HYDROMORPHONE HYDROCHLORIDE 1 MILLIGRAM(S): 2 INJECTION INTRAMUSCULAR; INTRAVENOUS; SUBCUTANEOUS at 09:27

## 2018-06-05 NOTE — BEHAVIORAL HEALTH ASSESSMENT NOTE - SUMMARY
89 yr old female with known diagnosis of dementia Last time here 5/2018 seen by Palliative Medicine   Advise reinstitute past recommendations for pain and behavioral control

## 2018-06-05 NOTE — CONSULT NOTE ADULT - SUBJECTIVE AND OBJECTIVE BOX
HPI: This is a frail elderly F transferred from the [facility] readmitted to [facility] with severe agitation, yelling out, non compliant with oxygen after being assessed and hypoxic.  She has multiple medical co-morbidities including Dementia Treated for a RHip Fx in May 2018 S/P R hip replacement.   She has been declining at facility, unable to rehab at facility not eating has dysphagia  and is on Puree consistency. She was extremely agitated earlier...repeating over/over again "Please help me" Unable to review systems due to mental status.  CC: Agitated Hyperactive Delirium waiting for UA    89 years old female with PMH of CAD, DM, CKD, HTN, HLD and Dementia sent from [facility] with dyspnea and hypoxia (88% on 3L).  As per [facility] "Resident with increased anxiety and non compliant with keeping oxygen on. Frequently yells out. Confused at times".  Patient is alert and awake and keeps asking for help. Unable to clarify her complaints/needs - just keep asking for help.   Patient had right hip fracture in May 2018 and was hospitalized at [facility]. She had hemiarthroplasty and was sent to rehab. As per family, her condition has been declining since her fracture. Earlier today she was very lethargic. No fever or cough.   She has dysphagia and is on Pureed Diet. (03 Jun 2018 22:57)      PERTINENT PMH REVIEWED: Yes    PAST MEDICAL & SURGICAL HISTORY:  Dementia  Coronary artery disease involving native coronary artery of native heart without angina pectoris  High cholesterol  HTN (hypertension)  DM (diabetes mellitus)  S/P cataract extraction and insertion of intraocular lens, right  S/P cataract extraction and insertion of intraocular lens, left    SOCIAL HISTORY:  EtOH   No                                    Drugs    No                                      nonsmoker                                    Admitted from: home  [facility] ____[facility]  Son Ted Russ [phone]  FAMILY HISTORY:  No pertinent family history in first degree relatives      No Known Allergies    Baseline ADLs (prior to admission):  Dependent      Present Symptoms:     Dyspnea: 0 1  Nausea/Vomiting:  No  Anxiety:  Yes   Depression: Yes   Fatigue: Yes   Loss of appetite: Yes     Pain: Hip and back pain            Character-            Duration-            Effect-            Factors-            Frequency-            Location-            Severity-    Review of Systems: Reviewed                     Unable to obtain due to poor mentation       MEDICATIONS  (STANDING):  acetaminophen    Suspension. 650 milliGRAM(s) Oral every 6 hours  ALBUTerol/ipratropium for Nebulization 3 milliLiter(s) Nebulizer every 6 hours  ALPRAZolam 0.5 milliGRAM(s) Oral every 8 hours  amLODIPine   Tablet 10 milliGRAM(s) Oral daily  ascorbic acid 500 milliGRAM(s) Oral daily  aspirin  chewable 81 milliGRAM(s) Oral daily  bisacodyl 10 milliGRAM(s) Oral at bedtime  cyanocobalamin 1000 MICROGram(s) Oral daily  docusate sodium 100 milliGRAM(s) Oral two times a day  enoxaparin Injectable 40 milliGRAM(s) SubCutaneous daily  fentaNYL   Patch  12 MICROgram(s)/Hr 1 Patch Transdermal every 72 hours  ferrous    sulfate 325 milliGRAM(s) Oral daily  lactobacillus acidophilus 1 Tablet(s) Oral two times a day with meals  latanoprost 0.005% Ophthalmic Solution 1 Drop(s) Both EYES at bedtime  metoprolol tartrate 25 milliGRAM(s) Oral two times a day  oxyCODONE    IR 10 milliGRAM(s) Oral every 6 hours  risperiDONE   Tablet 0.5 milliGRAM(s) Oral every 12 hours  sertraline 150 milliGRAM(s) Oral daily  simvastatin 40 milliGRAM(s) Oral at bedtime    MEDICATIONS  (PRN):  acetaminophen   Tablet 650 milliGRAM(s) Oral every 6 hours PRN For Temp greater than 38 C (100.4 F)  ALBUTerol    0.083% 2.5 milliGRAM(s) Nebulizer every 4 hours PRN Shortness of Breath and/or Wheezing  ALPRAZolam 0.5 milliGRAM(s) Oral three times a day PRN Anxiety  bisacodyl Suppository 10 milliGRAM(s) Rectal daily PRN Constipation  haloperidol    Injectable 1 milliGRAM(s) IntraMuscular every 6 hours PRN persistent agitation  HYDROmorphone  Injectable 0.5 milliGRAM(s) IV Push every 4 hours PRN Severe Pain (7 - 10)  oxyCODONE    IR 10 milliGRAM(s) Oral every 4 hours PRN Moderate Pain (4 - 6)  sodium biphosphate Rectal Enema 1 Enema Rectal daily PRN if no BM after suppository      PHYSICAL EXAM:    Vital Signs Last 24 Hrs  T(C): 36.7 (05 Jun 2018 15:32), Max: 36.8 (04 Jun 2018 23:43)  T(F): 98 (05 Jun 2018 15:32), Max: 98.2 (04 Jun 2018 23:43)  HR: 86 (05 Jun 2018 15:32) (70 - 92)  BP: 94/56 (05 Jun 2018 15:32) (94/56 - 148/71)  BP(mean): --  RR: 18 (05 Jun 2018 15:32) (18 - 20)  SpO2: 95% (05 Jun 2018 15:32) (90% - 100%)    General: alert  oriented x __0__ lethargic obtunded                  cachexia  nonverbal      HEENT: Dry mouth  Lungs: comfortable      CV: normal      GI:   distended  tender  Chronic Constipation??      : normal  incontinent  oliguria/anuria  castano    MSK: normal  weakness  edema                 Skin: heel Skin Preps no rash    LABS:                        7.8    14.0  )-----------( 297      ( 05 Jun 2018 07:17 )             26.1     06-05    142  |  108<H>  |  17.0  ----------------------------<  90  3.8   |  20.0<L>  |  0.95    Ca    8.4<L>      05 Jun 2018 07:17  Mg     1.8     06-05    TPro  5.9<L>  /  Alb  3.0<L>  /  TBili  0.3<L>  /  DBili  x   /  AST  24  /  ALT  11  /  AlkPhos  86  06-03    I&O's Summary    04 Jun 2018 07:01  -  05 Jun 2018 07:00  --------------------------------------------------------  IN: 1600 mL / OUT: 0 mL / NET: 1600 mL    RADIOLOGY & ADDITIONAL STUDIES:    ADVANCE DIRECTIVES:   DNR YES  Completed on:                     MOLST  YES    Completed on:  Living Will  NO   Completed on:

## 2018-06-05 NOTE — PROGRESS NOTE ADULT - SUBJECTIVE AND OBJECTIVE BOX
CC: Dyspnea with Hypoxia     INTERVAL HPI/OVERNIGHT EVENTS: Patient seen and examined this am, moaning, crying in pain. Patient exhibiting same behavior at [facility]. Prior admission notes reviewed, given Dilaudid 1 mg IV, Ofirmev IV infusion, Fentanyl patch 12 mcg resumed, patient now comfortable, breathing unlabored.     Vital Signs Last 24 Hrs  T(C): 36.7 (05 Jun 2018 08:00), Max: 36.8 (04 Jun 2018 23:43)  T(F): 98 (05 Jun 2018 08:00), Max: 98.2 (04 Jun 2018 23:43)  HR: 86 (05 Jun 2018 08:00) (70 - 92)  BP: 147/64 (05 Jun 2018 08:00) (95/52 - 148/71)  BP(mean): --  RR: 20 (05 Jun 2018 08:00) (18 - 20)  SpO2: 95% (05 Jun 2018 09:24) (90% - 100%)    ROS: Unable to assess due to baseline dementia.       PHYSICAL EXAM:  GENERAL: Elderly, sedated  HEAD:  Atraumatic, Normocephalic  NECK: Supple, No JVD, Normal thyroid  NERVOUS SYSTEM:  Alert, disoriented, generalized weakness  CHEST/LUNG: Clear to auscultation bilaterally; No rales, rhonchi, wheezing, or rubs  HEART: Regular rate and rhythm; No murmurs, rubs, or gallops  ABDOMEN: Soft, Nontender, Nondistended; Bowel sounds present  EXTREMITIES:  2+ Peripheral Pulses, No clubbing, cyanosis, or edema    I&O's Detail    04 Jun 2018 07:01  -  05 Jun 2018 07:00  --------------------------------------------------------  IN:    sodium chloride 0.9%: 1600 mL  Total IN: 1600 mL    OUT:  Total OUT: 0 mL    Total NET: 1600 mL          CARDIAC MARKERS ( 04 Jun 2018 08:30 )  x     / 0.07 ng/mL / 106 U/L / x     / x      CARDIAC MARKERS ( 04 Jun 2018 00:42 )  x     / 0.08 ng/mL / 132 U/L / x     / x      CARDIAC MARKERS ( 03 Jun 2018 16:55 )  x     / 0.09 ng/mL / 171 U/L / x     / 11.2 ng/mL                            7.8    14.0  )-----------( 297      ( 05 Jun 2018 07:17 )             26.1     05 Jun 2018 07:17    142    |  108    |  17.0   ----------------------------<  90     3.8     |  20.0   |  0.95     Ca    8.4        05 Jun 2018 07:17  Mg     1.8       05 Jun 2018 07:17    TPro  5.9    /  Alb  3.0    /  TBili  0.3    /  DBili  x      /  AST  24     /  ALT  11     /  AlkPhos  86     03 Jun 2018 16:55      CAPILLARY BLOOD GLUCOSE        LIVER FUNCTIONS - ( 03 Jun 2018 16:55 )  Alb: 3.0 g/dL / Pro: 5.9 g/dL / ALK PHOS: 86 U/L / ALT: 11 U/L / AST: 24 U/L / GGT: x               MEDICATIONS  (STANDING):  ALBUTerol/ipratropium for Nebulization 3 milliLiter(s) Nebulizer every 6 hours  amLODIPine   Tablet 10 milliGRAM(s) Oral daily  ascorbic acid 500 milliGRAM(s) Oral daily  aspirin  chewable 81 milliGRAM(s) Oral daily  cyanocobalamin 1000 MICROGram(s) Oral daily  docusate sodium 100 milliGRAM(s) Oral two times a day  enoxaparin Injectable 40 milliGRAM(s) SubCutaneous daily  fentaNYL   Patch  12 MICROgram(s)/Hr 1 Patch Transdermal every 72 hours  ferrous    sulfate 325 milliGRAM(s) Oral daily  lactobacillus acidophilus 1 Tablet(s) Oral two times a day with meals  latanoprost 0.005% Ophthalmic Solution 1 Drop(s) Both EYES at bedtime  metoprolol tartrate 25 milliGRAM(s) Oral two times a day  risperiDONE   Tablet 0.5 milliGRAM(s) Oral every 12 hours  sertraline 150 milliGRAM(s) Oral daily  simvastatin 40 milliGRAM(s) Oral at bedtime    MEDICATIONS  (PRN):  acetaminophen   Tablet 650 milliGRAM(s) Oral every 6 hours PRN For Temp greater than 38 C (100.4 F)  acetaminophen   Tablet. 650 milliGRAM(s) Oral every 6 hours PRN Mild Pain (1 - 3)  ALBUTerol    0.083% 2.5 milliGRAM(s) Nebulizer every 4 hours PRN Shortness of Breath and/or Wheezing  ALPRAZolam 0.5 milliGRAM(s) Oral three times a day PRN Anxiety  bisacodyl Suppository 10 milliGRAM(s) Rectal daily PRN Constipation  haloperidol    Injectable 2 milliGRAM(s) IntraMuscular every 6 hours PRN agitation  HYDROmorphone  Injectable 1 milliGRAM(s) IV Push every 6 hours PRN Severe Pain (7 - 10)  oxyCODONE    IR 10 milliGRAM(s) Oral every 6 hours PRN Severe Pain (7 - 10)  oxyCODONE    IR 5 milliGRAM(s) Oral every 6 hours PRN Moderate Pain (4 - 6)  sodium biphosphate Rectal Enema 1 Enema Rectal daily PRN if no BM after suppository      RADIOLOGY & ADDITIONAL TESTS:

## 2018-06-05 NOTE — BEHAVIORAL HEALTH ASSESSMENT NOTE - NSBHCHARTREVIEWLAB_PSY_A_CORE FT
7.8    14.0  )-----------( 297      ( 05 Jun 2018 07:17 )             26.1     06-05    142  |  108<H>  |  17.0  ----------------------------<  90  3.8   |  20.0<L>  |  0.95    Ca    8.4<L>      05 Jun 2018 07:17  Mg     1.8     06-05    TPro  5.9<L>  /  Alb  3.0<L>  /  TBili  0.3<L>  /  DBili  x   /  AST  24  /  ALT  11  /  AlkPhos  86  06-03    LIVER FUNCTIONS - ( 03 Jun 2018 16:55 )  Alb: 3.0 g/dL / Pro: 5.9 g/dL / ALK PHOS: 86 U/L / ALT: 11 U/L / AST: 24 U/L / GGT: x

## 2018-06-05 NOTE — BEHAVIORAL HEALTH ASSESSMENT NOTE - NSBHCONSULTMEDS_PSY_A_CORE FT
MEDICATIONS  (STANDING):  risperiDONE   Tablet 0.5 milliGRAM(s) Oral every 12 hours  sertraline 150 milliGRAM(s) Oral daily

## 2018-06-05 NOTE — BEHAVIORAL HEALTH ASSESSMENT NOTE - NSBHCHARTREVIEWIMAGING_PSY_A_CORE FT
< from: CT Head No Cont (06.03.18 @ 21:44) >    No intracranial hemorrhage, mass effect or large acute cortical infarct.  No significant interval change compared to the previous CT of May 10, 2018    < end of copied text >

## 2018-06-05 NOTE — CONSULT NOTE ADULT - ASSESSMENT
89 YO Frail ELDERLY fEMALE    Dyspnea Hypoxia   Continuous POX Nasal O2 if needed  Unsuccessful in ability to overcome repair of R Hip Fx Failed Rehab  Unrelieved PAIN    Agitation- Delirium in setting of Dementia  Psych Eval appreciated  Risperdal 0.5 tabs Q12  Xanax 1 mg 3x/d  Haldol < to 1mg IM Q6hprn-   R/O UTI urine sent to lab      Acute on Chronic Pain  Reinitiate Fentanyl 12mcg/hr patch  Oxycodone 10 mg  Q6 x 3 then revert back to prn  lidoderm patches

## 2018-06-05 NOTE — BEHAVIORAL HEALTH ASSESSMENT NOTE - NSBHCHARTREVIEWVS_PSY_A_CORE FT
Vital Signs Last 24 Hrs  T(C): 36.7 (05 Jun 2018 08:00), Max: 36.8 (04 Jun 2018 23:43)  T(F): 98 (05 Jun 2018 08:00), Max: 98.2 (04 Jun 2018 23:43)  HR: 86 (05 Jun 2018 08:00) (70 - 92)  BP: 147/64 (05 Jun 2018 08:00) (95/52 - 148/71)  BP(mean): --  RR: 20 (05 Jun 2018 08:00) (18 - 20)  SpO2: 95% (05 Jun 2018 09:24) (90% - 100%)

## 2018-06-05 NOTE — CONSULT NOTE ADULT - ATTENDING COMMENTS
COUNSELING:    Face to face meeting to discuss Advanced Care Planning - Time Spent ______ Minutes.  See goals of care note.    More than 50% time spent in counseling and coordinating care. ___40___ Minutes.     Thank you for the opportunity to assist with the care of this patient.   MEETING WITH MEHRAN KILPATRICK ON THURSDAY AT 11:30 AM FINALIZE PLAN  [facility] Consult Service [phone].                                                                                                                    NEED CONSENTS FOR HOSPICE SUPPORT    Mehran Kilpatrick was called; he spoke with [facility] RN who began discussing goals of care,   leaning towards comfort Hospice support.

## 2018-06-05 NOTE — BEHAVIORAL HEALTH ASSESSMENT NOTE - NSBHCHARTREVIEWINVESTIGATE_PSY_A_CORE FT
< from: 12 Lead ECG (06.03.18 @ 14:27) >    Normal sinus rhythm  Minimal voltage criteria for LVH, may be normal variant    < end of copied text >

## 2018-06-05 NOTE — BEHAVIORAL HEALTH ASSESSMENT NOTE - HPI (INCLUDE ILLNESS QUALITY, SEVERITY, DURATION, TIMING, CONTEXT, MODIFYING FACTORS, ASSOCIATED SIGNS AND SYMPTOMS)
sent from [facility] due to dyspnea  moaning in pain agitated baseline by re[port is severely confused  Notes by psych 5/11 and Rosa Maria Yarbrough NP Palliative Medicine appreciated

## 2018-06-06 PROCEDURE — 99233 SBSQ HOSP IP/OBS HIGH 50: CPT

## 2018-06-06 PROCEDURE — 99497 ADVNCD CARE PLAN 30 MIN: CPT

## 2018-06-06 RX ORDER — SODIUM CHLORIDE 9 MG/ML
500 INJECTION INTRAMUSCULAR; INTRAVENOUS; SUBCUTANEOUS ONCE
Qty: 0 | Refills: 0 | Status: COMPLETED | OUTPATIENT
Start: 2018-06-06 | End: 2018-06-06

## 2018-06-06 RX ORDER — SODIUM CHLORIDE 9 MG/ML
1000 INJECTION INTRAMUSCULAR; INTRAVENOUS; SUBCUTANEOUS
Qty: 0 | Refills: 0 | Status: DISCONTINUED | OUTPATIENT
Start: 2018-06-06 | End: 2018-06-06

## 2018-06-06 RX ORDER — HYDROMORPHONE HYDROCHLORIDE 2 MG/ML
0.5 INJECTION INTRAMUSCULAR; INTRAVENOUS; SUBCUTANEOUS ONCE
Qty: 0 | Refills: 0 | Status: DISCONTINUED | OUTPATIENT
Start: 2018-06-06 | End: 2018-06-06

## 2018-06-06 RX ORDER — HYDROMORPHONE HYDROCHLORIDE 2 MG/ML
0.5 INJECTION INTRAMUSCULAR; INTRAVENOUS; SUBCUTANEOUS
Qty: 100 | Refills: 0 | Status: DISCONTINUED | OUTPATIENT
Start: 2018-06-06 | End: 2018-06-07

## 2018-06-06 RX ORDER — ROBINUL 0.2 MG/ML
0.2 INJECTION INTRAMUSCULAR; INTRAVENOUS EVERY 6 HOURS
Qty: 0 | Refills: 0 | Status: DISCONTINUED | OUTPATIENT
Start: 2018-06-06 | End: 2018-06-07

## 2018-06-06 RX ORDER — SODIUM CHLORIDE 9 MG/ML
1000 INJECTION, SOLUTION INTRAVENOUS
Qty: 0 | Refills: 0 | Status: DISCONTINUED | OUTPATIENT
Start: 2018-06-06 | End: 2018-06-07

## 2018-06-06 RX ORDER — HYDRALAZINE HCL 50 MG
5 TABLET ORAL EVERY 6 HOURS
Qty: 0 | Refills: 0 | Status: DISCONTINUED | OUTPATIENT
Start: 2018-06-06 | End: 2018-06-07

## 2018-06-06 RX ADMIN — SODIUM CHLORIDE 1000 MILLILITER(S): 9 INJECTION INTRAMUSCULAR; INTRAVENOUS; SUBCUTANEOUS at 00:30

## 2018-06-06 RX ADMIN — OXYCODONE HYDROCHLORIDE 10 MILLIGRAM(S): 5 TABLET ORAL at 05:32

## 2018-06-06 RX ADMIN — SODIUM CHLORIDE 100 MILLILITER(S): 9 INJECTION INTRAMUSCULAR; INTRAVENOUS; SUBCUTANEOUS at 01:00

## 2018-06-06 RX ADMIN — Medication 650 MILLIGRAM(S): at 06:45

## 2018-06-06 RX ADMIN — Medication 650 MILLIGRAM(S): at 02:49

## 2018-06-06 RX ADMIN — HYDROMORPHONE HYDROCHLORIDE 0.5 MILLIGRAM(S): 2 INJECTION INTRAMUSCULAR; INTRAVENOUS; SUBCUTANEOUS at 11:10

## 2018-06-06 RX ADMIN — Medication 10 MILLIGRAM(S): at 10:06

## 2018-06-06 RX ADMIN — OXYCODONE HYDROCHLORIDE 10 MILLIGRAM(S): 5 TABLET ORAL at 06:32

## 2018-06-06 RX ADMIN — HYDROMORPHONE HYDROCHLORIDE 0.5 MILLIGRAM(S): 2 INJECTION INTRAMUSCULAR; INTRAVENOUS; SUBCUTANEOUS at 11:42

## 2018-06-06 RX ADMIN — RISPERIDONE 0.5 MILLIGRAM(S): 4 TABLET ORAL at 05:33

## 2018-06-06 RX ADMIN — Medication 0.5 MILLIGRAM(S): at 05:32

## 2018-06-06 RX ADMIN — SODIUM CHLORIDE 80 MILLILITER(S): 9 INJECTION, SOLUTION INTRAVENOUS at 23:35

## 2018-06-06 RX ADMIN — Medication 650 MILLIGRAM(S): at 05:32

## 2018-06-06 RX ADMIN — HYDROMORPHONE HYDROCHLORIDE 0.5 MG/HR: 2 INJECTION INTRAMUSCULAR; INTRAVENOUS; SUBCUTANEOUS at 17:18

## 2018-06-06 RX ADMIN — Medication 650 MILLIGRAM(S): at 01:49

## 2018-06-06 RX ADMIN — ENOXAPARIN SODIUM 40 MILLIGRAM(S): 100 INJECTION SUBCUTANEOUS at 15:51

## 2018-06-06 NOTE — PROGRESS NOTE ADULT - ASSESSMENT
89 years old female with PMH of CAD, DM, CKD, HTN, HLD and Dementia sent from [facility] with dyspnea and hypoxia (88% on 3L).  As per [facility] charts "Resident with increased anxiety and non compliant with keeping oxygen on. Frequently yells out. Confused at times".  Patient had right hip fracture in May 2018 and was hospitalized at [facility]. She had hemiarthroplasty and was sent to rehab. As per family, her condition has been declining since her fracture.       1) Hypoxia  - Unclear etiology. No respiratory distress.   - CTA ruled out PE and Pneumonia.  - DuoNebs q 6 hours and Incentive Spirometry  - Palliative consulted.  2) Acute Metabolic encephalopathy on dementia  - Will increase Risperdal to 0.5mg PO BID  - Continue Haldol prn.   3) Elevated Troponin  - No acute EKG changes.  - Trend Troponin.  - Aspirin 300 mg suppository stat  - Hold Heparin Infusion (ordered in ER). Discussed with patient's son Ted Schultz - agreed with plan.  4) Dehydration  - NS bolus given in ER. Continue IVF at 100 cc/hour.  5) CKD Stage 3  - Stable   - Avoid nephrotoxic medications  6) DM  - Diet controlled  7) CAD, HLD and HTN  - Continue Aspirin, Simvastatin, Metoprolol and Amlodipine  8) Dysphagia  - Aspiration precautions  - Pureed Diet  DVT Prophylaxis -- Lovenox 40 mg
89 years old female with PMH of CAD, DM, CKD, HTN, HLD and Dementia sent from [facility] with dyspnea and hypoxia (88% on 3L).  As per [facility] charts "Resident with increased anxiety and non compliant with keeping oxygen on. Frequently yells out. Confused at times".  Patient had right hip fracture in May 2018 and was hospitalized at [facility]. She had hemiarthroplasty and was sent to rehab. As per family, her condition has been declining since her fracture. Patient son contacted by Hospice, will be having meeting on Thursday. Patient currently requiring IV/IM medications for pain control/agitation.       1) Hypoxia  - Unclear etiology. No respiratory distress.   - CTA ruled out PE and Pneumonia.  - DuoNebs q 6 hours and Incentive Spirometry  - Patient saturating 94% on Room air, CM and  discontinued  -Moniotr pulse ox with vital signs    2) Acute Metabolic encephalopathy on dementia  - Will increase Risperdal to 0.5mg PO BID  - Continue Haldol prn.   -Appreciate Psychiatry input    3) Elevated Troponin  - No acute EKG changes.  - Troponin trending down  - Aspirin daily  - Hospice Consult/Palliative following    4) Dehydration  - NS bolus given in ER. Continue IVF at 100 cc/hour.    5) CKD Stage 3  - Stable   - Avoid nephrotoxic medications  6) DM  - Diet controlled    7) CAD, HLD and HTN  - Continue Aspirin, Simvastatin, Metoprolol and Amlodipine    8) Dysphagia  - Aspiration precautions  - Pureed Diet with Honey liquids  -Speech/swallow consult    DVT Prophylaxis -- Lovenox 40 mg
89 years old female with PMH of CAD, DM, CKD, HTN, HLD and Dementia sent from [facility] with dyspnea and hypoxia (88% on 3L).  As per [facility] charts "Resident with increased anxiety and non compliant with keeping oxygen on. Frequently yells out. Confused at times".  Patient had right hip fracture in May 2018 and was hospitalized at [facility]. She had hemiarthroplasty and was sent to rehab. As per family, her condition has been declining since her fracture. Patient son contacted by Hospice, will be having meeting on Thursday. Patient currently requiring IV/IM medications for pain control/agitation.       1) Hypoxia  - Unclear etiology. No respiratory distress.   - CTA ruled out PE and Pneumonia.  - DuoNebs q 6 hours    2) Acute Metabolic encephalopathy on dementia  - IV Ativan/IM Haldol    3) Elevated Troponin  - No acute EKG changes.  - Hospice Consult/Palliative following    4) Dehydration  - NS bolus given in ER. Continue IVF at 100 cc/hour.    5) CKD Stage 3  - Stable   - Avoid nephrotoxic medications  6) DM  - not taking oral    7) CAD, HLD and HTN  -unable to take oral medications  Hydralazine IV as needed    8) Dysphagia  - Unable to take po    9) Urinary Retention  -Kim inserted for Comfort    DVT Prophylaxis -- Lovenox 40 mg    Dispo: Son to meet with Hospice at 10 am tomorrow.
89 yo F admitted with AMS severe agitation and restlessness    Hyperactive Delirium superimposed over Dementia  Aitivan 1mg IVP and Q4 prn   Dilaudid 0.5mg/hr for dyspnea and pain    PAIN  S/P Fall Fractures and Surgical intervention  Assume Pain is Present  Use PAIN AD Scale to assess pain and medicate accordingly    Son Ted contacted by phone, agrees comfort most important, and understanding our intention to use Continuous Opioid  Infusion, and Ativan ATC for aggressive symptom management

## 2018-06-06 NOTE — PROGRESS NOTE ADULT - SUBJECTIVE AND OBJECTIVE BOX
INTERVAL HPI/OVERNIGHT EVENTS: 89yo Female Patient moaning loudly not making eye contact agitated and recoiling with any physical touch or care.  Orders to medicate for pain and agitation given Stat. IV access lost and then re established.  Airway clear, secretions managed at present.  Calling Son Ted to inform him Mom is suffering, we need to mange her symptoms with a continuous opioid infusion.  He understands she is rapidly declining and agrees we should keep her comfortable.     89y old  Female who presents with a chief complaint of Dyspnea with Hypoxia (88% on 3L) (03 Jun 2018 22:57)      Present Symptoms:     Dyspnea: 0 1 2 3   Nausea/Vomiting: Yes No  Anxiety:  Yes No  Depression: Yes No  Fatigue: Yes No  Loss of appetite: Yes No    Pain:             Character-            Duration-            Effect-            Factors-            Frequency-            Location-            Severity-    Review of Systems: Reviewed                     Negative:                     Positive:  Unable to obtain due to poor mentation   All others negative    MEDICATIONS  (STANDING):  acetaminophen    Suspension. 650 milliGRAM(s) Oral every 6 hours  aspirin  chewable 81 milliGRAM(s) Oral daily  enoxaparin Injectable 40 milliGRAM(s) SubCutaneous daily  fentaNYL   Patch  12 MICROgram(s)/Hr 1 Patch Transdermal every 72 hours  HYDROmorphone Infusion 0.5 mG/Hr (0.5 mL/Hr) IV Continuous <Continuous>  risperiDONE   Tablet 0.5 milliGRAM(s) Oral every 12 hours  sodium chloride 0.9%. 1000 milliLiter(s) (100 mL/Hr) IV Continuous <Continuous>    MEDICATIONS  (PRN):  acetaminophen   Tablet 650 milliGRAM(s) Oral every 6 hours PRN For Temp greater than 38 C (100.4 F)  ALBUTerol    0.083% 2.5 milliGRAM(s) Nebulizer every 4 hours PRN Shortness of Breath and/or Wheezing  bisacodyl Suppository 10 milliGRAM(s) Rectal daily PRN Constipation  glycopyrrolate Injectable 0.2 milliGRAM(s) IV Push every 6 hours PRN secretion management  haloperidol    Injectable 1 milliGRAM(s) IntraMuscular every 6 hours PRN persistent agitation  HYDROmorphone  Injectable 0.5 milliGRAM(s) IV Push every 4 hours PRN Severe Pain (7 - 10)  LORazepam   Injectable 1 milliGRAM(s) IV Push every 4 hours PRN Agitation  sodium biphosphate Rectal Enema 1 Enema Rectal daily PRN if no BM after suppository      PHYSICAL EXAM:    Vital Signs Last 24 Hrs  T(C): 36.6 (06 Jun 2018 08:01), Max: 36.7 (05 Jun 2018 15:32)  T(F): 97.9 (06 Jun 2018 08:01), Max: 98 (05 Jun 2018 15:32)  HR: 86 (06 Jun 2018 08:01) (82 - 87)  BP: 134/52 (06 Jun 2018 08:01) (85/35 - 134/52)  BP(mean): --  RR: 18 (06 Jun 2018 08:01) (18 - 20)  SpO2: 96% (06 Jun 2018 08:01) (94% - 100%)    General: alert  oriented x ____ lethargic agitated                  cachexia  nonverbal  coma    Karnofsky:  %    HEENT: normal  dry mouth  ET tube/trach    Lungs: comfortable tachypnea/labored breathing  excessive secretions    CV: normal  tachycardia    GI: normal  distended  tender  no BS               PEG/NG/OG tube  constipation  last BM:     : normal  incontinent  oliguria/anuria  castano    MSK: normal  weakness  edema             ambulatory  bedbound/wheelchair bound    Skin: normal  pressure ulcers- Stage_____  no rash    LABS:                        7.8    14.0  )-----------( 297      ( 05 Jun 2018 07:17 )             26.1     06-05    142  |  108<H>  |  17.0  ----------------------------<  90  3.8   |  20.0<L>  |  0.95    Ca    8.4<L>      05 Jun 2018 07:17  Mg     1.8     06-05          I&O's Summary      RADIOLOGY & ADDITIONAL STUDIES:    ADVANCE DIRECTIVES:   DNR YES NO  Completed on:                     MOLST  YES NO   Completed on:  Living Will  YES NO   Completed on:    COUNSELING:    Face to face meeting to discuss Advanced Care Planning - Time Spent ______ Minutes.  See goals of care note.    More than 50% time spent in counseling and coordinating care. ______ Minutes.     Thank you for the opportunity to assist with the care of this patient.   [facility] Consult Service [phone]. INTERVAL HPI/OVERNIGHT EVENTS: 89yo Female Patient moaning loudly not making eye contact agitated and recoiling with any physical touch or care.  Orders to medicate for pain and agitation given Stat. IV access lost and then re established.  Airway clear, secretions managed at present.  Calling Son Ted to inform him Mom is suffering, we need to manage her symptoms with a continuous opioid infusion.  He understands she is rapidly declining and agrees we should keep her comfortable. Unable to ROS due to mental status    89y old  Female who presents with a chief complaint of Dyspnea with Hypoxia (88% on 3L) (03 Jun 2018 22:57)  PAST MEDICAL & SURGICAL HISTORY:  Dementia  Coronary artery disease involving native coronary artery of native heart without angina pectoris  High cholesterol  HTN (hypertension)  DM (diabetes mellitus)  S/P cataract extraction and insertion of intraocular lens, right  S/P cataract extraction and insertion of intraocular lens, left    Present Symptoms:     Dyspnea:  1- 2   Nausea/Vomiting: No  Anxiety:  Yes Agitation  Depression:  No  Fatigue: Yes  Loss of appetite: Yes     Pain: Assume Pain is Present- Using Pain Ad Assessment Tool  Scored 7/10 Severe PAIN            Character-            Duration-            Effect-            Factors-            Frequency-            Location-            Severity-    Review of Systems: Reviewed                                        Unable to obtain due to poor mentation     MEDICATIONS  (STANDING):  acetaminophen    Suspension. 650 milliGRAM(s) Oral every 6 hours  aspirin  chewable 81 milliGRAM(s) Oral daily  enoxaparin Injectable 40 milliGRAM(s) SubCutaneous daily  fentaNYL   Patch  12 MICROgram(s)/Hr 1 Patch Transdermal every 72 hours  HYDROmorphone Infusion 0.5 mG/Hr (0.5 mL/Hr) IV Continuous <Continuous>  risperiDONE   Tablet 0.5 milliGRAM(s) Oral every 12 hours  sodium chloride 0.9%. 1000 milliLiter(s) (100 mL/Hr) IV Continuous <Continuous>    MEDICATIONS  (PRN):  acetaminophen   Tablet 650 milliGRAM(s) Oral every 6 hours PRN For Temp greater than 38 C (100.4 F)  ALBUTerol    0.083% 2.5 milliGRAM(s) Nebulizer every 4 hours PRN Shortness of Breath and/or Wheezing  bisacodyl Suppository 10 milliGRAM(s) Rectal daily PRN Constipation  glycopyrrolate Injectable 0.2 milliGRAM(s) IV Push every 6 hours PRN secretion management  haloperidol    Injectable 1 milliGRAM(s) IntraMuscular every 6 hours PRN persistent agitation  HYDROmorphone  Injectable 0.5 milliGRAM(s) IV Push every 4 hours PRN Severe Pain (7 - 10)  LORazepam   Injectable 1 milliGRAM(s) IV Push every 4 hours PRN Agitation  sodium biphosphate Rectal Enema 1 Enema Rectal daily PRN if no BM after suppository      PHYSICAL EXAM:    Vital Signs Last 24 Hrs  T(C): 36.6 (06 Jun 2018 08:01), Max: 36.7 (05 Jun 2018 15:32)  T(F): 97.9 (06 Jun 2018 08:01), Max: 98 (05 Jun 2018 15:32)  HR: 86 (06 Jun 2018 08:01) (82 - 87)  BP: 134/52 (06 Jun 2018 08:01) (85/35 - 134/52)  BP(mean): --  RR: 18 (06 Jun 2018 08:01) (18 - 20)  SpO2: 96% (06 Jun 2018 08:01) (94% - 100%)    General: _ agitated                  cachexia  nonverbal  moaning loudly    HEENT: dry mouth      Lungs:  tachypnea/labored breathing      CV: normal      GI: normal                constipation  last BM:     : ncontinent      MSK:  weakness  edema            bedbound    Skin: normal  _  no rash    LABS:                        7.8    14.0  )-----------( 297      ( 05 Jun 2018 07:17 )             26.1     06-05    142  |  108<H>  |  17.0  ----------------------------<  90  3.8   |  20.0<L>  |  0.95    Ca    8.4<L>      05 Jun 2018 07:17  Mg     1.8     06-05    I&O's Summary    RADIOLOGY & ADDITIONAL STUDIES:    ADVANCE DIRECTIVES:   DNR YES   Completed on:                     MOLST  YES    Completed on:  Living Will   NO   Completed on:

## 2018-06-06 NOTE — GOALS OF CARE CONVERSATION - PERSONAL ADVANCE DIRECTIVE - WHAT MATTERS MOST
That his mother is made comfortable that she not be anxious and agitated, and that she not suffer anymore

## 2018-06-06 NOTE — GOALS OF CARE CONVERSATION - PERSONAL ADVANCE DIRECTIVE - TREATMENT GUIDELINE COMMENT
Treat Pain and agitation aggressively  NPO Status due to unconscious state    Telephone conversation 16 min

## 2018-06-06 NOTE — PROGRESS NOTE ADULT - SUBJECTIVE AND OBJECTIVE BOX
CC: Dyspnea with Hypoxia    INTERVAL HPI/OVERNIGHT EVENTS: Patient seen and examined. Crying out for help, calling husbands name, resists care, recoiling to touch, no eye contact, not taking anything by mouth, relieved only by IV Dilaudid and Ativan. Had BM today, having urinary retention requiring straight cath    Vital Signs Last 24 Hrs  T(C): 36.6 (06 Jun 2018 08:01), Max: 36.7 (05 Jun 2018 15:32)  T(F): 97.9 (06 Jun 2018 08:01), Max: 98 (05 Jun 2018 15:32)  HR: 86 (06 Jun 2018 08:01) (82 - 87)  BP: 134/52 (06 Jun 2018 08:01) (85/35 - 134/52)  BP(mean): --  RR: 18 (06 Jun 2018 08:01) (18 - 20)  SpO2: 96% (06 Jun 2018 08:01) (94% - 100%)    ROS:  Unable to assess due to current mental status    PHYSICAL EXAM:  GENERAL: Elderly, sedated  HEAD:  Atraumatic, Normocephalic  NECK: Supple, No JVD, Normal thyroid  NERVOUS SYSTEM:  Alert, disoriented, generalized weakness  CHEST/LUNG: Clear to auscultation bilaterally; No rales, rhonchi, wheezing, or rubs  HEART: Regular rate and rhythm; No murmurs, rubs, or gallops  ABDOMEN: Soft, Nontender, Nondistended; Bowel sounds present  EXTREMITIES:  2+ Peripheral Pulses, No clubbing, cyanosis, or edema    I&O's Detail                                7.8    14.0  )-----------( 297      ( 05 Jun 2018 07:17 )             26.1     05 Jun 2018 07:17    142    |  108    |  17.0   ----------------------------<  90     3.8     |  20.0   |  0.95     Ca    8.4        05 Jun 2018 07:17  Mg     1.8       05 Jun 2018 07:17        CAPILLARY BLOOD GLUCOSE              MEDICATIONS  (STANDING):  acetaminophen    Suspension. 650 milliGRAM(s) Oral every 6 hours  aspirin  chewable 81 milliGRAM(s) Oral daily  enoxaparin Injectable 40 milliGRAM(s) SubCutaneous daily  fentaNYL   Patch  12 MICROgram(s)/Hr 1 Patch Transdermal every 72 hours  HYDROmorphone Infusion 0.5 mG/Hr (0.5 mL/Hr) IV Continuous <Continuous>  risperiDONE   Tablet 0.5 milliGRAM(s) Oral every 12 hours  sodium chloride 0.9%. 1000 milliLiter(s) (100 mL/Hr) IV Continuous <Continuous>    MEDICATIONS  (PRN):  acetaminophen   Tablet 650 milliGRAM(s) Oral every 6 hours PRN For Temp greater than 38 C (100.4 F)  ALBUTerol    0.083% 2.5 milliGRAM(s) Nebulizer every 4 hours PRN Shortness of Breath and/or Wheezing  bisacodyl Suppository 10 milliGRAM(s) Rectal daily PRN Constipation  glycopyrrolate Injectable 0.2 milliGRAM(s) IV Push every 6 hours PRN secretion management  haloperidol    Injectable 1 milliGRAM(s) IntraMuscular every 6 hours PRN persistent agitation  HYDROmorphone  Injectable 0.5 milliGRAM(s) IV Push every 4 hours PRN Severe Pain (7 - 10)  LORazepam   Injectable 1 milliGRAM(s) IV Push every 4 hours PRN Agitation  sodium biphosphate Rectal Enema 1 Enema Rectal daily PRN if no BM after suppository      RADIOLOGY & ADDITIONAL TESTS:

## 2018-06-06 NOTE — PROGRESS NOTE ADULT - ATTENDING COMMENTS
COUNSELING:    Face to face meeting to discuss Advanced Care Planning - Time Spent _16_____ Minutes.                          See goals of care note.    More than 50% time spent in counseling and coordinating care. _45___ Minutes.     Thank you for the opportunity to assist with the care of this patient.   [facility] Consult Service [phone].

## 2018-06-07 ENCOUNTER — TRANSCRIPTION ENCOUNTER (OUTPATIENT)
Age: 83
End: 2018-06-07

## 2018-06-07 VITALS
TEMPERATURE: 98 F | SYSTOLIC BLOOD PRESSURE: 148 MMHG | RESPIRATION RATE: 18 BRPM | DIASTOLIC BLOOD PRESSURE: 63 MMHG | OXYGEN SATURATION: 94 % | HEART RATE: 96 BPM

## 2018-06-07 PROCEDURE — 94760 N-INVAS EAR/PLS OXIMETRY 1: CPT

## 2018-06-07 PROCEDURE — 83735 ASSAY OF MAGNESIUM: CPT

## 2018-06-07 PROCEDURE — 83880 ASSAY OF NATRIURETIC PEPTIDE: CPT

## 2018-06-07 PROCEDURE — 85027 COMPLETE CBC AUTOMATED: CPT

## 2018-06-07 PROCEDURE — 84134 ASSAY OF PREALBUMIN: CPT

## 2018-06-07 PROCEDURE — 93005 ELECTROCARDIOGRAM TRACING: CPT

## 2018-06-07 PROCEDURE — 36415 COLL VENOUS BLD VENIPUNCTURE: CPT

## 2018-06-07 PROCEDURE — 96374 THER/PROPH/DIAG INJ IV PUSH: CPT | Mod: XU

## 2018-06-07 PROCEDURE — 84484 ASSAY OF TROPONIN QUANT: CPT

## 2018-06-07 PROCEDURE — 80048 BASIC METABOLIC PNL TOTAL CA: CPT

## 2018-06-07 PROCEDURE — 82550 ASSAY OF CK (CPK): CPT

## 2018-06-07 PROCEDURE — 94640 AIRWAY INHALATION TREATMENT: CPT

## 2018-06-07 PROCEDURE — 70450 CT HEAD/BRAIN W/O DYE: CPT

## 2018-06-07 PROCEDURE — 83605 ASSAY OF LACTIC ACID: CPT

## 2018-06-07 PROCEDURE — 80053 COMPREHEN METABOLIC PANEL: CPT

## 2018-06-07 PROCEDURE — 87040 BLOOD CULTURE FOR BACTERIA: CPT

## 2018-06-07 PROCEDURE — 99291 CRITICAL CARE FIRST HOUR: CPT | Mod: 25

## 2018-06-07 PROCEDURE — 71275 CT ANGIOGRAPHY CHEST: CPT

## 2018-06-07 PROCEDURE — 71045 X-RAY EXAM CHEST 1 VIEW: CPT

## 2018-06-07 PROCEDURE — 82553 CREATINE MB FRACTION: CPT

## 2018-06-07 PROCEDURE — 99239 HOSP IP/OBS DSCHRG MGMT >30: CPT

## 2018-06-07 RX ORDER — OXYCODONE HYDROCHLORIDE 5 MG/1
1 TABLET ORAL
Qty: 0 | Refills: 0 | COMMUNITY

## 2018-06-07 RX ORDER — FERROUS SULFATE 325(65) MG
1 TABLET ORAL
Qty: 0 | Refills: 0 | COMMUNITY

## 2018-06-07 RX ORDER — MAGNESIUM HYDROXIDE 400 MG/1
30 TABLET, CHEWABLE ORAL
Qty: 0 | Refills: 0 | COMMUNITY

## 2018-06-07 RX ORDER — HYDROMORPHONE HYDROCHLORIDE 2 MG/ML
0.5 INJECTION INTRAMUSCULAR; INTRAVENOUS; SUBCUTANEOUS
Qty: 100 | Refills: 0 | OUTPATIENT
Start: 2018-06-07 | End: 2018-06-20

## 2018-06-07 RX ORDER — HYDROMORPHONE HYDROCHLORIDE 2 MG/ML
0.5 INJECTION INTRAMUSCULAR; INTRAVENOUS; SUBCUTANEOUS
Qty: 0 | Refills: 0 | COMMUNITY
Start: 2018-06-07

## 2018-06-07 RX ORDER — PREGABALIN 225 MG/1
1 CAPSULE ORAL
Qty: 0 | Refills: 0 | COMMUNITY

## 2018-06-07 RX ORDER — ASCORBIC ACID 60 MG
1 TABLET,CHEWABLE ORAL
Qty: 0 | Refills: 0 | COMMUNITY

## 2018-06-07 RX ORDER — LACTOBACILLUS ACIDOPHILUS 100MM CELL
1 CAPSULE ORAL
Qty: 0 | Refills: 0 | COMMUNITY

## 2018-06-07 RX ORDER — FENTANYL CITRATE 50 UG/ML
1 INJECTION INTRAVENOUS
Qty: 0 | Refills: 0 | COMMUNITY
Start: 2018-06-07

## 2018-06-07 RX ORDER — ALPRAZOLAM 0.25 MG
1 TABLET ORAL
Qty: 0 | Refills: 0 | COMMUNITY

## 2018-06-07 RX ORDER — ROBINUL 0.2 MG/ML
0.2 INJECTION INTRAMUSCULAR; INTRAVENOUS
Qty: 0 | Refills: 0 | COMMUNITY
Start: 2018-06-07

## 2018-06-07 RX ORDER — HALOPERIDOL DECANOATE 100 MG/ML
1 INJECTION INTRAMUSCULAR
Qty: 0 | Refills: 0 | COMMUNITY
Start: 2018-06-07

## 2018-06-07 RX ADMIN — SODIUM CHLORIDE 80 MILLILITER(S): 9 INJECTION, SOLUTION INTRAVENOUS at 08:36

## 2018-06-07 RX ADMIN — HYDROMORPHONE HYDROCHLORIDE 0.5 MILLIGRAM(S): 2 INJECTION INTRAMUSCULAR; INTRAVENOUS; SUBCUTANEOUS at 09:13

## 2018-06-07 RX ADMIN — HYDROMORPHONE HYDROCHLORIDE 0.5 MILLIGRAM(S): 2 INJECTION INTRAMUSCULAR; INTRAVENOUS; SUBCUTANEOUS at 08:58

## 2018-06-07 RX ADMIN — HYDROMORPHONE HYDROCHLORIDE 0.5 MILLIGRAM(S): 2 INJECTION INTRAMUSCULAR; INTRAVENOUS; SUBCUTANEOUS at 05:25

## 2018-06-07 RX ADMIN — HYDROMORPHONE HYDROCHLORIDE 0.5 MILLIGRAM(S): 2 INJECTION INTRAMUSCULAR; INTRAVENOUS; SUBCUTANEOUS at 04:58

## 2018-06-07 NOTE — DISCHARGE NOTE ADULT - PLAN OF CARE
The patient will remain comfortable No acute EKG changes  No treatment as per son resolved failed her attempts to rehab and get on her feet after fall and hip repair.   delirium and dementia end stage  Family requests Hospice support services  unable to take oral medications right hip fracture in May 2018   Pain control

## 2018-06-07 NOTE — DISCHARGE NOTE ADULT - MEDICATION SUMMARY - MEDICATIONS TO TAKE
I will START or STAY ON the medications listed below when I get home from the hospital:    fentaNYL 12 mcg/hr transdermal film, extended release  -- 1 patch by transdermal patch every 72 hours  -- Indication: For Pain control    HYDROmorphone  -- 0.5 milligram(s) intravenous every 4 hours, As Needed  -- Indication: For Comfort    HYDROmorphone 1 mg/mL-NaCl 0.9% intravenous solution  -- 0.5 milligram(s) intravenous every hour MDD:12 mg  -- Indication: For Comfort    LORazepam  -- 1 milligram(s) intravenous every 6 hours, As Needed  -- Indication: For Anxiety    haloperidol  -- 1 milligram(s) intravenous every 6 hours, As Needed  -- Indication: For Agitation    glycopyrrolate 0.2 mg/mL injectable solution  -- 0.2 milligram(s) injectable every 6 hours, As Needed  -- Indication: For Secretions    bisacodyl 10 mg rectal suppository  -- 1 suppository(ies) rectally once a day, As needed, Constipation  -- Indication: For Constipation    Fleet Enema 7 g-19 g rectal enema  -- 133 milliliter(s) rectally , As Needed  -- Indication: For Constipation    Travatan Z 0.004% ophthalmic solution  -- 1 drop(s) to each affected eye once a day (in the evening)  -- Indication: For Eye drops

## 2018-06-07 NOTE — ED ADULT NURSE NOTE - PSH
no
S/P cataract extraction and insertion of intraocular lens, left    S/P cataract extraction and insertion of intraocular lens, right

## 2018-06-07 NOTE — GOALS OF CARE CONVERSATION - PERSONAL ADVANCE DIRECTIVE - CONVERSATION DETAILS
Pt known to me from prior admission  spoke to son henry mckeon he is interested in a hospice eval but cannot come in until thursday 6/7 at 10 am for meeting .  will meet with henry on thursday . pt is newly admitted so being medically managed . will continue to follow will endorsed above  cc radha henderson
spoke to son henry he will come tomorrow at 10 am - to speak to hospice
Met with son henry agreeable to  the [facility] . pt approved for the hosp[facility]  transfer 1230pm . family aware and agreeable to transfer.
I called Son who is scheduled to meet with Hospice RN tomorrow to sign on to  Hospice Services.  I needed to inform him that his mother was extremely agitated moaning loudly whenever she would partially   awaken from previously sedated state.    We agreed that she failed her attempts to rehab and get on her feet after fall and hip repair.  He admitted he knew she was rapidly declining, and he wanted her to be comfortable without  agitation pain or distress    I explained the treatment plan and rationale for same. Verbal consent was obtained

## 2018-06-07 NOTE — DISCHARGE NOTE ADULT - MEDICATION SUMMARY - MEDICATIONS TO STOP TAKING
I will STOP taking the medications listed below when I get home from the hospital:    Mag-Ox 400  -- 1 tab(s) by mouth 3 times a day Stop 8/22/17    Vitamin B12 1000 mcg oral tablet  -- 1 tab(s) by mouth once a day    Xanax 0.25 mg oral tablet  -- 1 tab(s) by mouth once a day, As Needed    simvastatin 40 mg oral tablet  -- 1 tab(s) by mouth once a day (at bedtime)    aspirin 81 mg oral tablet, chewable  -- 1 tab(s) by mouth once a day    risperiDONE 0.25 mg oral tablet, disintegrating  -- 1 tab(s) by mouth 2 times a day    busPIRone 10 mg oral tablet  -- 1 tab(s) by mouth 2 times a day    amLODIPine 10 mg oral tablet  -- 1 tab(s) by mouth once a day    memantine 5 mg oral tablet  -- 1 tab(s) by mouth once a day    metoprolol tartrate 25 mg oral tablet  -- 0.5 tab(s) by mouth 2 times a day   -- It is very important that you take or use this exactly as directed.  Do not skip doses or discontinue unless directed by your doctor.  May cause drowsiness.  Alcohol may intensify this effect.  Use care when operating dangerous machinery.  Some non-prescription drugs may aggravate your condition.  Read all labels carefully.  If a warning appears, check with your doctor before taking.  Take with food or milk.  This drug may impair the ability to drive or operate machinery.  Use care until you become familiar with its effects.    HYDROmorphone 2 mg oral tablet  -- 1 tab(s) by mouth every 6 hours    HYDROmorphone 2 mg oral tablet  -- 1 tab(s) by mouth every 4 hours, As needed, Severe Pain (7 - 10)    QUEtiapine 25 mg oral tablet  -- 1 tab(s) by mouth once a day (at bedtime)    Lovenox 40 mg/0.4 mL injectable solution  -- 40 milligram(s) subcutaneously once a day   -- It is very important that you take or use this exactly as directed.  Do not skip doses or discontinue unless directed by your doctor.    ferrous sulfate 325 mg (65 mg elemental iron) oral tablet  -- 1 tab(s) by mouth once a day    Milk of Magnesia 8% oral suspension  -- 30 milliliter(s) by mouth , As Needed    oxyCODONE 10 mg oral tablet  -- 1 tab(s) by mouth every 6 hours, As Needed    Vitamin C 500 mg oral tablet  -- 1 tab(s) by mouth once a day    Xanax 0.5 mg oral tablet  -- 1 tab(s) by mouth 3 times a day    sertraline 100 mg oral tablet  -- 1.5 tab(s) by mouth once a day    Acidophilus oral tablet  -- 1 tab(s) by mouth 2 times a day

## 2018-06-07 NOTE — DISCHARGE NOTE ADULT - NS AS DC AMI ACE CONTRA
other reasons documented by Physician / Nurse Practitioner / Physician Assistant  or Pharmacist for not prescribing an ACEI AND not prescibing an ARB at discharge/no further medical treatment requested by family

## 2018-06-07 NOTE — DISCHARGE NOTE ADULT - NS AS DC AMI BB CONTRA
no further medical treatment as per family/other reasons documentated by Physician / Nurse Practitioner / Physician Assistant or Pharmacist

## 2018-06-07 NOTE — DISCHARGE NOTE ADULT - MEDICATION SUMMARY - MEDICATIONS TO CHANGE
I will SWITCH the dose or number of times a day I take the medications listed below when I get home from the hospital:    fentaNYL 25 mcg/hr transdermal film, extended release  -- Apply on skin to affected area every 72 hours MDD:1  -- Caution federal law prohibits the transfer of this drug to any person other  than the person for whom it was prescribed.  Do not use unless you have been treated with another narcotic pain medicine and you are tolerant to it.  For external use only.  It is very important that you take or use this exactly as directed.  Do not skip doses or discontinue unless directed by your doctor.  May cause drowsiness.  Alcohol may intensify this effect.  Use care when operating dangerous machinery.  Remove old patch prior to applying a new patch.  This prescription cannot be refilled.  Using more of this medication than prescribed may cause serious breathing problems.

## 2018-06-07 NOTE — DISCHARGE NOTE ADULT - HOSPITAL COURSE
89 years old female with PMH of CAD, DM, CKD, HTN, HLD and Dementia sent from [facility] with dyspnea and hypoxia (88% on 3L).  As per [facility] charts "Resident with increased anxiety and non compliant with keeping oxygen on. Frequently yells out. Confused at times".  Patient had right hip fracture in May 2018 and was hospitalized at [facility]. She had hemiarthroplasty and was sent to rehab. As per family, her condition has been declining since her fracture. In ER noted to have elevated troponins without EKG changes, as per son does not want further medical treatment.  Palliative consulted, spoke with son,  Hospice consulted and accepted to in patient for end stage Dementia/Delirium.  Patient currently requiring IV/IM medications for pain control/agitation. Kim inserted for retention and continued comfort care. IV dilaudid drip started for patient comfort.     Vital Signs Last 24 Hrs  T(C): 36.8 (07 Jun 2018 08:14), Max: 36.8 (07 Jun 2018 08:14)  T(F): 98.2 (07 Jun 2018 08:14), Max: 98.2 (07 Jun 2018 08:14)  HR: 96 (07 Jun 2018 08:14) (78 - 96)  BP: 148/63 (07 Jun 2018 08:14) (114/68 - 148/63)  BP(mean): --  RR: 18 (07 Jun 2018 08:14) (9 - 18)  SpO2: 94% (07 Jun 2018 08:14) (93% - 99%)                  CAPILLARY BLOOD GLUCOSE        PHYSICAL EXAM:  GENERAL: Elderly, sedated  HEAD:  Atraumatic, Normocephalic  NECK: Supple, No JVD, Normal thyroid  CHEST/LUNG: Clear to auscultation bilaterally; No rales, rhonchi, wheezing, or rubs  HEART: Regular rate and rhythm; No murmurs, rubs, or gallops  ABDOMEN: Soft, Nontender, Nondistended; Bowel sounds present  EXTREMITIES:  2+ Peripheral Pulses, No clubbing, cyanosis, or edema

## 2018-06-07 NOTE — DISCHARGE NOTE ADULT - CARE PLAN
Principal Discharge DX:	NSTEMI (non-ST elevated myocardial infarction)  Goal:	The patient will remain comfortable  Assessment and plan of treatment:	No acute EKG changes  No treatment as per son  Secondary Diagnosis:	Hypoxia  Assessment and plan of treatment:	resolved  Secondary Diagnosis:	Dementia  Assessment and plan of treatment:	failed her attempts to rehab and get on her feet after fall and hip repair.   delirium and dementia end stage  Family requests Hospice support services  unable to take oral medications  Secondary Diagnosis:	S/P hip replacement  Assessment and plan of treatment:	right hip fracture in May 2018   Pain control

## 2018-06-09 LAB
CULTURE RESULTS: SIGNIFICANT CHANGE UP
CULTURE RESULTS: SIGNIFICANT CHANGE UP
SPECIMEN SOURCE: SIGNIFICANT CHANGE UP
SPECIMEN SOURCE: SIGNIFICANT CHANGE UP

## 2018-06-13 ENCOUNTER — APPOINTMENT (OUTPATIENT)
Dept: ORTHOPEDIC SURGERY | Facility: CLINIC | Age: 83
End: 2018-06-13

## 2018-06-13 DIAGNOSIS — Z96.649 PRESENCE OF UNSPECIFIED ARTIFICIAL HIP JOINT: ICD-10-CM

## 2018-11-14 NOTE — ED ADULT NURSE NOTE - NS PRO PASSIVE SMOKE EXP
Pt noted to have taken off Nasal cannula  Continues to request for bed to be positioned flat despite continuous educated regarding her respiratory status and fluem accumulation  O2 stat remains above 95%  Pt continues to verbally insult staff with demand to speak with a Doctor  Yonny has cleared significantly  Additional measures taken to make pt comfortable  No

## 2019-11-15 NOTE — H&P ADULT - PROBLEM SELECTOR PLAN 7
normal (ped)...
d/w son over the phone.   advised to speak to orthopedics (PA also updated) re need for surgery as will need to consent for OR.

## 2021-07-27 NOTE — ED PROVIDER NOTE - CPE EDP CARDIAC NORM
Limited TTE by on call cardiology fellow for hypotension and known AS  - AS- severity cannot be accurately assessed  - LV appears hyperdynamic    F/u attending read in AM  Primitivo Zapata MD PGY5 normal...

## 2021-11-11 NOTE — ED PROVIDER NOTE - OTHER FINDINGS
ANTICOAGULATION MANAGEMENT     Jovon Mantilla 64 year old male is on warfarin with therapeutic INR result. (Goal INR 2.5-3.5)    Recent labs: (last 7 days)     11/11/21  1238   INR 2.7*       ASSESSMENT     Source(s): Chart Review and Patient/Caregiver Call       Warfarin doses taken: Warfarin taken as instructed    Diet: No new diet changes identified    New illness, injury, or hospitalization: Yes: patient saw provider at [facility] today for Raynaud's in toes and fear that his mitral valve may be causing symptoms--provider ordered echo.    Medication/supplement changes: None noted    Signs or symptoms of bleeding or clotting: No    Previous INR: Therapeutic last 2(+) visits    Additional findings: Patient is aware that listed PCP is retiring in January, he is not certain if he will continue coming to  [facility], he will ponder.     PLAN     Recommended plan for no diet, medication or health factor changes affecting INR     Dosing Instructions: Continue your current warfarin dose with next INR in 5 weeks       Summary  As of 11/11/2021    Full warfarin instructions:  6 mg every Wed, Sat; 4 mg all other days   Next INR check:  12/16/2021             Telephone call with Jovon who verbalizes understanding and agrees to plan    Lab visit scheduled    Education provided: Contact [phone]  with any changes, questions or concerns.     Plan made per [facility] anticoagulation protocol    Madeline Wayne RN  Anticoagulation Clinic  11/11/2021    _______________________________________________________________________     Anticoagulation Episode Summary     Current INR goal:  2.5-3.5   TTR:  86.2 % (12 mo)   Target end date:  Indefinite   Send INR reminders to:  [facility]    Indications    S/P AVR (aortic valve replacement) [Z95.2]  Permanent atrial fibrillation (H) [I48.21]  Long term current use of anticoagulants with INR goal of 2.5-3.5 [Z79.01]  Abdominal wall hematoma  initial encounter [S30.1XXA]           Comments:  24mm  ATS Valve         Anticoagulation Care Providers     Provider Role Specialty Phone number    Rehan Lorenzana MD Referring Family Medicine [phone]    Ramana Redd MD Referring Student in organized health care education/training program [phone]             QT/QTc duration= 390/449 ms

## 2022-01-01 NOTE — PATIENT PROFILE ADULT. - FUNCTIONAL SCREEN CURRENT LEVEL: DRESSING, MLM
Geovanny Machado is 5 day old, here for a preventive care visit.    Assessment & Plan     Geovanny was seen today for well child.    Diagnoses and all orders for this visit:    WCC (well child check),  under 8 days old    Hyperbilirubinemia,   -     Bilirubin Direct and Total; Future  -     Bilirubin Direct and Total    Feeding difficulties  -     Occupational Therapy Referral; Future    - f/u in 3 days for weight check. Baby is 9% weight loss since BW. Educated to feed q2hr during the day and q2-3hr at night. Continue use of formula.   - Baby will push tongue out with feeds and have some difficulty swallowing. No tongue-tie noted on exam. Will refer to OT for feeding issues.   - Repeat bilirubin WNL today. May discontinue use of bili blanket.     Growth      Weight change since birth: -9%    OFC: Normal, Length:Normal , Weight: Abnormal: 9% weight loss    Immunizations     Vaccines up to date.      Anticipatory Guidance    Reviewed age appropriate anticipatory guidance.   The following topics were discussed:  SOCIAL/FAMILY    responding to cry/ fussiness  NUTRITION:    pumping/ introduce bottle    always hold to feed/ never prop bottle  HEALTH/ SAFETY:    cord care    temperature taking    safe crib environment    sleep on back        Referrals/Ongoing Specialty Care  No    Follow Up      Return in about 3 weeks (around 2022) for Preventive Care visit.    Subjective     Additional Questions 2022   Do you have any questions today that you would like to discuss? Yes   Questions Spitting up; wants education on formula; has rash on chin;   Has your child had a surgery, major illness or injury since the last physical exam? No     Patient has been advised of split billing requirements and indicates understanding: Yes  Ordering of each unique test  32 minutes spent on the date of the encounter doing chart review, history and exam, documentation and further activities per the note      Taking similac.  "Feeding q2-3hr during the day. Taking 15ml-30ml per feed. Mild spitup after feed with no projectile vomiting. Baby will apprpriately feed max q4hr at night. BM daily and soft. Voids adequate.   Sleeps in bassinet on back and appropriate.   2 dogs in home with 9 new puppies. Pets tolerating infant well.   Smoking + in basement and outside, but not around infant.     Birth History  Birth History     Birth     Length: 47 cm (1' 6.5\")     Weight: 3.21 kg (7 lb 1.2 oz)     HC 34 cm (13.39\")     Apgar     One: 8     Five: 9     Discharge Weight: 2.927 kg (6 lb 7.2 oz)     Delivery Method: Vaginal, Spontaneous     Gestation Age: 36 4/7 wks     complicated by hyperbilirubinemia.  Initially breast-feeding but had significant weight loss and hyperbilirubinemia within 24 hours. Eventually transition to formula feedings. Required prolonged phototherapy. Discharged home with a phototherapy bilirubin blanket with bilirubin level at 12.8 when patient is 95 hours old.      Immunization History   Administered Date(s) Administered     Hep B, Peds or Adolescent 2022     Hepatitis B # 1 given in nursery: yes  [place] metabolic screening: Results Not Known at this time   hearing screen: Passed--data reviewed       Social 2022   Who does your child live with? Parent(s)   Who takes care of your child? Parent(s), Grandparent(s)   Has your child experienced any stressful family events recently? (!) BIRTH OF BABY   In the past 12 months, has lack of transportation kept you from medical appointments or from getting medications? No   In the last 12 months, was there a time when you were not able to pay the mortgage or rent on time? No   In the last 12 months, was there a time when you did not have a steady place to sleep or slept in a shelter (including now)? No       Health Risks/Safety 2022   What type of car seat does your child use?  Infant car seat   Is your child's car seat forward or rear facing? Rear facing "   Where does your child sit in the car?  Back seat       TB Screening 2022   Was your child born outside of the [place]? No     TB Screening 2022   Since your last Well Child visit, have any of your child's family members or close contacts had tuberculosis or a positive tuberculosis test? No            Diet 2022   Do you have questions about feeding your baby? (!) YES   Please specify:  spits up a lot   What does your baby eat?  Breast milk, Formula   Which type of formula? Similac Pro Advance   How does your baby eat? Breast feeding / Nursing, Bottle   How often does your baby eat? (From the start of one feed to start of the next feed) every 2-3 hours   Do you give your child vitamins or supplements? None   Within the past 12 months, you worried that your food would run out before you got money to buy more. Never true   Within the past 12 months, the food you bought just didn't last and you didn't have money to get more. Never true     Elimination 2022   How many times per day does your baby have a wet diaper?  5 or more times per 24 hours   How many times per day does your baby poop?  4 or more times per 24 hours             Sleep 2022   Where does your baby sleep? Bassinet   In what position does your baby sleep? Back   How many times does your child wake in the night?  every 2-4 hours     Vision/Hearing 2022   Do you have any concerns about your child's hearing or vision?  No concerns         Development/ Social-Emotional Screen 2022   Does your child receive any special services? No     Development  Milestones (by observation/ exam/ report) 75-90% ile  PERSONAL/ SOCIAL/COGNITIVE:    Sustains periods of wakefulness for feeding    Makes brief eye contact with adult when held  LANGUAGE:    Cries with discomfort    Calms to adult's voice  GROSS MOTOR:    Lifts head briefly when prone    Kicks / equal movements  FINE MOTOR/ ADAPTIVE:    Keeps hands in a  "fist        Constitutional, eye, ENT, skin, respiratory, cardiac, GI, MSK, neuro, and allergy are normal except as otherwise noted.       Objective     Exam  Pulse 140   Temp 98  F (36.7  C)   Resp 34   Ht 0.483 m (1' 7\")   Wt 2.92 kg (6 lb 7 oz)   HC 48.3 cm (19\")   SpO2 98%   BMI 12.54 kg/m    >99 %ile (Z= 10.62) based on WHO (Boys, 0-2 years) head circumference-for-age based on Head Circumference recorded on 2022.  10 %ile (Z= -1.28) based on WHO (Boys, 0-2 years) weight-for-age data using vitals from 2022.  10 %ile (Z= -1.27) based on WHO (Boys, 0-2 years) Length-for-age data based on Length recorded on 2022.  39 %ile (Z= -0.28) based on WHO (Boys, 0-2 years) weight-for-recumbent length data based on body measurements available as of 2022.  Physical Exam  GENERAL: Active, alert, in no acute distress.  SKIN: Clear. No significant rash, abnormal pigmentation or lesions  HEAD: Normocephalic. Normal fontanels and sutures.  EYES: Conjunctivae and cornea normal. Red reflexes present bilaterally.  EARS: Normal canals. Tympanic membranes are normal; gray and translucent.  NOSE: Normal without discharge.  MOUTH/THROAT: Clear. No oral lesions.  NECK: Supple, no masses.  LYMPH NODES: No adenopathy  LUNGS: Clear. No rales, rhonchi, wheezing or retractions  HEART: Regular rhythm. Normal S1/S2. No murmurs. Normal femoral pulses.  ABDOMEN: Soft, non-tender, not distended, no masses or hepatosplenomegaly. Normal umbilicus and bowel sounds.   GENITALIA: Normal male external genitalia. Dameon stage I,  Testes descended bilaterally, no hernia or hydrocele.    EXTREMITIES: Hips normal with negative Ortolani and Lopes. Symmetric creases and  no deformities  NEUROLOGIC: Normal tone throughout. Normal reflexes for age        Nela Carnes MD  [facility] - [place]  " (4) completely dependent

## 2022-08-01 NOTE — ED ADULT NURSE NOTE - MUSCULOSKELETAL WDL
Discharge Summary    CHIEF COMPLAINT ON ADMISSION  Chief Complaint   Patient presents with   • Suicidal Ideation       Reason for Admission  ems    Admission Date  7/27/2022     CODE STATUS  Full Code    HPI & HOSPITAL COURSE  Katerina Owen is a 38 y.o. female who presented 7/27/2022 to be transferred back to psychiatry unit.  Initially came to [facility] on July 24 for suicide attempt which she slit her wrist.  Reports taking legal and illegal drugs as a means of suicide.  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  Patient was given Rocephin for his left wrist and was evaluated by orthopedic surgery and underwent tendon and nerve repair on July 25.  Seen by psychiatry and was started on Abilify.  She was discharged to inpatient psychiatric facility.  [facility], however [facility] would not accept the patient due to the hard splint in place on her left wrist as this potentially is a hazard.  She was then referred to the ED and readmitted. Per ortho rolando Wesley to replace hard splint with wrist cockup brace. This brace, sutures, and padding are to remain in place until patient follows up with ortho 10-14 days following her surgery on 7/25. Her mood is improving, she is denying depression and suicidal ideation. Patient is medically clear and has been accepted to an inpatient psychiatric hospital.      Therefore, she is discharged in fair and stable condition to a psychiatric hospital.    The patient met 2-midnight criteria for an inpatient stay at the time of discharge.      FOLLOW UP ITEMS POST DISCHARGE  Follow up with ortho surgery   Follow up with PCP    DISCHARGE DIAGNOSES  Principal Problem:    Schizophrenia (HCC) POA: Unknown  Active Problems:    Hypertension POA: Unknown      Overview: related to pregnancy had  Preeclampsia and emergency C/S in 2007     Laceration of left wrist POA: Unknown    Suicidal ideation POA: Yes  Resolved  "Problems:    Gardnerella vaginitis POA: Unknown      FOLLOW UP  Future Appointments   Date Time Provider Department Center   8/9/2022  2:45 PM Tia Elizabeth M.D. [facility]   8/25/2022  2:00 PM Ronnie Montgomery M.D. [facility] [address]   11/21/2022  3:00 PM DELMA Vines [facility] None     No follow-up provider specified.    MEDICATIONS ON DISCHARGE     Medication List      START taking these medications      Instructions   metoprolol tartrate 25 MG Tabs  Commonly known as: LOPRESSOR   Take 1 Tablet by mouth 2 times a day.  Dose: 25 mg     oxyCODONE immediate-release 5 MG Tabs  Commonly known as: ROXICODONE   Take 1 Tablet by mouth every four hours as needed for Severe Pain for up to 3 days.  Dose: 5 mg     polyethylene glycol/lytes 17 g Pack  Commonly known as: MIRALAX   Take 1 Packet by mouth 1 time a day as needed (if sennosides and docusate ineffective after 24 hours).  Dose: 17 g        CONTINUE taking these medications      Instructions   amLODIPine 5 MG Tabs  Commonly known as: NORVASC   Take 1 Tablet by mouth every day.  Dose: 5 mg     ARIPiprazole 5 MG tablet  Commonly known as: Abilify   Take 1 Tablet by mouth every day.  Dose: 5 mg        STOP taking these medications    metroNIDAZOLE 500 MG Tabs  Commonly known as: FLAGYL            Allergies  Allergies   Allergen Reactions   • Morphine Nausea and Unspecified     \"feel sick for a whole day after being given morphine\"  RXN=2/2016     • Vicodin [Hydrocodone-Acetaminophen] Vomiting and Nausea     ARG=[id number]       DIET  Orders Placed This Encounter   Procedures   • Diet Order Diet: Regular     Standing Status:   Standing     Number of Occurrences:   1     Order Specific Question:   Diet:     Answer:   Regular [1]       ACTIVITY  As tolerated.  Weight bearing as tolerated    LINES, DRAINS, AND WOUNDS  This is an automated list. Peripheral IVs will be removed prior to discharge.       Wound 07/25/22 Incision Wrist Left (Active) "   Site Assessment Clean;Dry;Intact 08/01/22 0830   Periwound Assessment Clean;Dry;Intact 08/01/22 0830   Margins DAKOTAH 07/31/22 2100   Drainage Amount None 07/31/22 0915   Dressing Options Ace Wrap;Dry Roll Gauze 07/30/22 0840   Dressing Status Clean;Dry;Intact 07/31/22 2100                  MENTAL STATUS ON TRANSFER  AOx4       CONSULTATIONS  Psychiatry     PROCEDURES  None    LABORATORY  Lab Results   Component Value Date    SODIUM 139 07/26/2022    POTASSIUM 4.0 07/26/2022    CHLORIDE 105 07/26/2022    CO2 24 07/26/2022    GLUCOSE 111 (H) 07/26/2022    BUN 9 07/26/2022    CREATININE 0.61 07/26/2022    CREATININE 0.7 10/29/2007        Lab Results   Component Value Date    WBC 7.9 07/27/2022    HEMOGLOBIN 12.2 07/27/2022    HEMATOCRIT 35.8 (L) 07/27/2022    PLATELETCT 228 07/27/2022        Total time of the discharge process exceeds 32 minutes.   Full range of motion of upper and lower extremities, no joint tenderness/swelling.

## 2022-09-28 NOTE — PATIENT PROFILE ADULT. - AS [facility]
----- Message from Yoanna Bravo [phone] (home)     Case number [id number]    
Pt states she is experiencing brown spotting, her LMP WAS 07/12/2020. Pt was informed that brown spotting is not alarming, but if she notices bright red bleeding we would take the next course of action. Pt verbalized understanding. Pt has appt scheduled for next week  
37.1
(3) slightly limited

## 2023-09-15 NOTE — ED PROVIDER NOTE - CRITICAL CARE PROVIDED
direct patient care (not related to procedure)/interpretation of diagnostic studies/additional history taking/documentation
162.56

## 2024-06-04 NOTE — DISCHARGE NOTE ADULT - LAUNCH MEDICATION RECONCILIATION
Initiate Treatment: Ketoconazole shampoo 2% \\nDoxycycline 100mg Detail Level: Zone Render In Strict Bullet Format?: No <<-----Click here for Discharge Medication Review Initiate Treatment: Ciclopirox solution 8%\\nterbinafine 250 mg QD

## 2024-11-14 NOTE — ED ADULT TRIAGE NOTE - NSWEIGHTCALCTOOLDRUG_GEN_A_CORE
symptoms worsen or fail to improve.   Orders Placed This Encounter    XR WRIST LEFT (MIN 3 VIEWS)     Standing Status:   Future     Standing Expiration Date:   11/13/2025         Electronically signed by Gregory Lewis PA-C on 11/14/2024 at 3:40 PM.    Dragon Disclaimer:   This note was dictated with voice recognition software.  Though review and corrections are routine, we apologize for any errors.    used

## 2024-11-23 NOTE — ED PROVIDER NOTE - NS_ATTENDINGSCRIBE_ED_ALL_ED
I personally performed the service described in the documentation recorded by the scribe in my presence, and it accurately and completely records my words and actions. 4 = No assist / stand by assistance

## 2024-12-31 NOTE — H&P ADULT - NEGATIVE SKIN SYMPTOMS
no itching/no rash I have personally seen and examined the patient. I have collaborated with and supervised the